# Patient Record
Sex: FEMALE | Race: BLACK OR AFRICAN AMERICAN | NOT HISPANIC OR LATINO | Employment: OTHER | URBAN - METROPOLITAN AREA
[De-identification: names, ages, dates, MRNs, and addresses within clinical notes are randomized per-mention and may not be internally consistent; named-entity substitution may affect disease eponyms.]

---

## 2021-07-07 ENCOUNTER — CLINICAL SUPPORT (OUTPATIENT)
Dept: REHABILITATION | Facility: HOSPITAL | Age: 71
End: 2021-07-07
Payer: MEDICAID

## 2021-07-07 DIAGNOSIS — M79.651 RIGHT THIGH PAIN: ICD-10-CM

## 2021-07-07 PROCEDURE — 97161 PT EVAL LOW COMPLEX 20 MIN: CPT

## 2021-07-12 ENCOUNTER — CLINICAL SUPPORT (OUTPATIENT)
Dept: REHABILITATION | Facility: HOSPITAL | Age: 71
End: 2021-07-12
Payer: MEDICAID

## 2021-07-12 DIAGNOSIS — M79.651 RIGHT THIGH PAIN: Primary | ICD-10-CM

## 2021-07-12 PROCEDURE — 97110 THERAPEUTIC EXERCISES: CPT | Performed by: PHYSICAL THERAPIST

## 2021-07-12 PROCEDURE — 97113 AQUATIC THERAPY/EXERCISES: CPT | Performed by: PHYSICAL THERAPIST

## 2021-07-14 ENCOUNTER — CLINICAL SUPPORT (OUTPATIENT)
Dept: REHABILITATION | Facility: HOSPITAL | Age: 71
End: 2021-07-14
Payer: MEDICAID

## 2021-07-14 DIAGNOSIS — M79.651 RIGHT THIGH PAIN: ICD-10-CM

## 2021-07-14 PROCEDURE — 97110 THERAPEUTIC EXERCISES: CPT

## 2021-07-19 ENCOUNTER — CLINICAL SUPPORT (OUTPATIENT)
Dept: REHABILITATION | Facility: HOSPITAL | Age: 71
End: 2021-07-19
Payer: MEDICAID

## 2021-07-19 DIAGNOSIS — M54.40 BILATERAL LOW BACK PAIN WITH SCIATICA, SCIATICA LATERALITY UNSPECIFIED, UNSPECIFIED CHRONICITY: ICD-10-CM

## 2021-07-19 DIAGNOSIS — M54.17 LUMBOSACRAL RADICULOPATHY: ICD-10-CM

## 2021-07-19 PROCEDURE — 97110 THERAPEUTIC EXERCISES: CPT | Performed by: PHYSICAL THERAPIST

## 2021-07-21 ENCOUNTER — CLINICAL SUPPORT (OUTPATIENT)
Dept: REHABILITATION | Facility: HOSPITAL | Age: 71
End: 2021-07-21
Payer: MEDICAID

## 2021-07-21 DIAGNOSIS — M79.651 RIGHT THIGH PAIN: Primary | ICD-10-CM

## 2021-07-21 PROCEDURE — 97110 THERAPEUTIC EXERCISES: CPT | Performed by: PHYSICAL THERAPIST

## 2021-07-27 ENCOUNTER — CLINICAL SUPPORT (OUTPATIENT)
Dept: REHABILITATION | Facility: HOSPITAL | Age: 71
End: 2021-07-27
Payer: MEDICAID

## 2021-07-27 DIAGNOSIS — M79.651 RIGHT THIGH PAIN: ICD-10-CM

## 2021-07-27 PROCEDURE — 97110 THERAPEUTIC EXERCISES: CPT

## 2021-08-04 ENCOUNTER — CLINICAL SUPPORT (OUTPATIENT)
Dept: REHABILITATION | Facility: HOSPITAL | Age: 71
End: 2021-08-04
Payer: MEDICAID

## 2021-08-04 DIAGNOSIS — M79.651 RIGHT THIGH PAIN: Primary | ICD-10-CM

## 2021-08-04 PROCEDURE — 97110 THERAPEUTIC EXERCISES: CPT | Performed by: PHYSICAL THERAPIST

## 2021-08-09 ENCOUNTER — CLINICAL SUPPORT (OUTPATIENT)
Dept: REHABILITATION | Facility: HOSPITAL | Age: 71
End: 2021-08-09
Payer: MEDICAID

## 2021-08-09 DIAGNOSIS — M79.651 RIGHT THIGH PAIN: Primary | ICD-10-CM

## 2021-08-09 PROCEDURE — 97110 THERAPEUTIC EXERCISES: CPT | Performed by: PHYSICAL THERAPIST

## 2021-08-11 ENCOUNTER — CLINICAL SUPPORT (OUTPATIENT)
Dept: REHABILITATION | Facility: HOSPITAL | Age: 71
End: 2021-08-11
Payer: MEDICAID

## 2021-08-11 DIAGNOSIS — M79.651 RIGHT THIGH PAIN: Primary | ICD-10-CM

## 2021-08-11 PROCEDURE — 97110 THERAPEUTIC EXERCISES: CPT | Performed by: PHYSICAL THERAPIST

## 2021-08-17 ENCOUNTER — CLINICAL SUPPORT (OUTPATIENT)
Dept: REHABILITATION | Facility: HOSPITAL | Age: 71
End: 2021-08-17
Payer: MEDICAID

## 2021-08-17 DIAGNOSIS — M79.651 RIGHT THIGH PAIN: ICD-10-CM

## 2021-08-17 PROCEDURE — 97110 THERAPEUTIC EXERCISES: CPT

## 2021-08-19 ENCOUNTER — CLINICAL SUPPORT (OUTPATIENT)
Dept: REHABILITATION | Facility: HOSPITAL | Age: 71
End: 2021-08-19
Payer: MEDICAID

## 2021-08-19 DIAGNOSIS — M79.651 RIGHT THIGH PAIN: ICD-10-CM

## 2021-08-19 PROCEDURE — 97110 THERAPEUTIC EXERCISES: CPT

## 2021-08-24 ENCOUNTER — CLINICAL SUPPORT (OUTPATIENT)
Dept: REHABILITATION | Facility: HOSPITAL | Age: 71
End: 2021-08-24
Payer: MEDICAID

## 2021-08-24 DIAGNOSIS — M79.651 RIGHT THIGH PAIN: ICD-10-CM

## 2021-08-24 PROCEDURE — 97110 THERAPEUTIC EXERCISES: CPT

## 2021-09-21 ENCOUNTER — CLINICAL SUPPORT (OUTPATIENT)
Dept: REHABILITATION | Facility: HOSPITAL | Age: 71
End: 2021-09-21
Payer: MEDICAID

## 2021-09-21 DIAGNOSIS — M79.651 RIGHT THIGH PAIN: ICD-10-CM

## 2021-09-21 PROCEDURE — 97110 THERAPEUTIC EXERCISES: CPT

## 2021-09-23 ENCOUNTER — CLINICAL SUPPORT (OUTPATIENT)
Dept: REHABILITATION | Facility: HOSPITAL | Age: 71
End: 2021-09-23
Payer: MEDICAID

## 2021-09-23 DIAGNOSIS — M79.651 RIGHT THIGH PAIN: ICD-10-CM

## 2021-09-23 PROCEDURE — 97110 THERAPEUTIC EXERCISES: CPT

## 2021-09-30 ENCOUNTER — CLINICAL SUPPORT (OUTPATIENT)
Dept: REHABILITATION | Facility: HOSPITAL | Age: 71
End: 2021-09-30
Payer: MEDICAID

## 2021-09-30 DIAGNOSIS — M79.651 RIGHT THIGH PAIN: Primary | ICD-10-CM

## 2021-09-30 PROCEDURE — 97110 THERAPEUTIC EXERCISES: CPT | Performed by: PHYSICAL THERAPIST

## 2021-10-04 ENCOUNTER — CLINICAL SUPPORT (OUTPATIENT)
Dept: REHABILITATION | Facility: HOSPITAL | Age: 71
End: 2021-10-04
Payer: MEDICAID

## 2021-10-04 DIAGNOSIS — M25.612 DECREASED ROM OF LEFT SHOULDER: ICD-10-CM

## 2021-10-04 DIAGNOSIS — R29.3 POSTURE ABNORMALITY: ICD-10-CM

## 2021-10-04 PROCEDURE — 97162 PT EVAL MOD COMPLEX 30 MIN: CPT

## 2021-10-07 ENCOUNTER — CLINICAL SUPPORT (OUTPATIENT)
Dept: REHABILITATION | Facility: HOSPITAL | Age: 71
End: 2021-10-07
Payer: MEDICAID

## 2021-10-07 DIAGNOSIS — M79.651 RIGHT THIGH PAIN: Primary | ICD-10-CM

## 2021-10-07 PROCEDURE — 97110 THERAPEUTIC EXERCISES: CPT | Performed by: PHYSICAL THERAPIST

## 2021-10-11 ENCOUNTER — CLINICAL SUPPORT (OUTPATIENT)
Dept: REHABILITATION | Facility: HOSPITAL | Age: 71
End: 2021-10-11
Payer: MEDICAID

## 2021-10-11 DIAGNOSIS — M79.651 RIGHT THIGH PAIN: Primary | ICD-10-CM

## 2021-10-11 PROCEDURE — 97110 THERAPEUTIC EXERCISES: CPT | Performed by: PHYSICAL THERAPIST

## 2021-10-14 ENCOUNTER — CLINICAL SUPPORT (OUTPATIENT)
Dept: REHABILITATION | Facility: HOSPITAL | Age: 71
End: 2021-10-14
Payer: MEDICAID

## 2021-10-14 DIAGNOSIS — M79.651 RIGHT THIGH PAIN: ICD-10-CM

## 2021-10-14 PROCEDURE — 97110 THERAPEUTIC EXERCISES: CPT | Mod: CQ

## 2021-10-18 ENCOUNTER — CLINICAL SUPPORT (OUTPATIENT)
Dept: REHABILITATION | Facility: HOSPITAL | Age: 71
End: 2021-10-18
Payer: MEDICAID

## 2021-10-18 DIAGNOSIS — M79.651 RIGHT THIGH PAIN: ICD-10-CM

## 2021-10-18 PROCEDURE — 97110 THERAPEUTIC EXERCISES: CPT | Mod: CQ

## 2021-10-21 ENCOUNTER — CLINICAL SUPPORT (OUTPATIENT)
Dept: REHABILITATION | Facility: HOSPITAL | Age: 71
End: 2021-10-21
Payer: MEDICAID

## 2021-10-21 DIAGNOSIS — M79.651 RIGHT THIGH PAIN: Primary | ICD-10-CM

## 2021-10-21 PROCEDURE — 97110 THERAPEUTIC EXERCISES: CPT | Performed by: PHYSICAL THERAPIST

## 2021-10-26 ENCOUNTER — CLINICAL SUPPORT (OUTPATIENT)
Dept: REHABILITATION | Facility: HOSPITAL | Age: 71
End: 2021-10-26
Payer: MEDICAID

## 2021-10-26 DIAGNOSIS — R29.3 POSTURE ABNORMALITY: ICD-10-CM

## 2021-10-26 DIAGNOSIS — M25.612 DECREASED ROM OF LEFT SHOULDER: ICD-10-CM

## 2021-10-26 PROCEDURE — 97110 THERAPEUTIC EXERCISES: CPT

## 2021-10-28 ENCOUNTER — CLINICAL SUPPORT (OUTPATIENT)
Dept: REHABILITATION | Facility: HOSPITAL | Age: 71
End: 2021-10-28
Payer: MEDICAID

## 2021-10-28 DIAGNOSIS — R29.3 POSTURE ABNORMALITY: ICD-10-CM

## 2021-10-28 DIAGNOSIS — M25.612 DECREASED ROM OF LEFT SHOULDER: ICD-10-CM

## 2021-10-28 PROCEDURE — 97110 THERAPEUTIC EXERCISES: CPT

## 2021-11-02 ENCOUNTER — CLINICAL SUPPORT (OUTPATIENT)
Dept: REHABILITATION | Facility: HOSPITAL | Age: 71
End: 2021-11-02
Payer: MEDICAID

## 2021-11-02 DIAGNOSIS — R29.3 POSTURE ABNORMALITY: ICD-10-CM

## 2021-11-02 DIAGNOSIS — M25.612 DECREASED ROM OF LEFT SHOULDER: ICD-10-CM

## 2021-11-02 PROCEDURE — 97110 THERAPEUTIC EXERCISES: CPT

## 2021-11-04 ENCOUNTER — CLINICAL SUPPORT (OUTPATIENT)
Dept: REHABILITATION | Facility: HOSPITAL | Age: 71
End: 2021-11-04
Payer: MEDICAID

## 2021-11-04 DIAGNOSIS — M79.651 RIGHT THIGH PAIN: ICD-10-CM

## 2021-11-04 PROCEDURE — 97110 THERAPEUTIC EXERCISES: CPT

## 2021-11-09 ENCOUNTER — CLINICAL SUPPORT (OUTPATIENT)
Dept: REHABILITATION | Facility: HOSPITAL | Age: 71
End: 2021-11-09
Payer: MEDICAID

## 2021-11-09 DIAGNOSIS — M79.651 RIGHT THIGH PAIN: ICD-10-CM

## 2021-11-09 PROCEDURE — 97110 THERAPEUTIC EXERCISES: CPT

## 2021-11-22 DIAGNOSIS — M54.12 CERVICAL RADICULOPATHY: Primary | ICD-10-CM

## 2021-11-23 ENCOUNTER — CLINICAL SUPPORT (OUTPATIENT)
Dept: REHABILITATION | Facility: HOSPITAL | Age: 71
End: 2021-11-23
Payer: MEDICAID

## 2021-11-23 DIAGNOSIS — M25.612 DECREASED ROM OF LEFT SHOULDER: ICD-10-CM

## 2021-11-23 DIAGNOSIS — R29.898 DECREASED ROM OF NECK: ICD-10-CM

## 2021-11-23 DIAGNOSIS — M54.2 NECK PAIN, CHRONIC: ICD-10-CM

## 2021-11-23 DIAGNOSIS — G89.29 NECK PAIN, CHRONIC: ICD-10-CM

## 2021-11-23 DIAGNOSIS — R29.3 POSTURE ABNORMALITY: ICD-10-CM

## 2021-11-23 PROCEDURE — 97162 PT EVAL MOD COMPLEX 30 MIN: CPT

## 2021-11-23 PROCEDURE — 97110 THERAPEUTIC EXERCISES: CPT

## 2021-11-30 PROBLEM — R29.898 DECREASED ROM OF NECK: Status: ACTIVE | Noted: 2021-11-30

## 2021-11-30 PROBLEM — G89.29 NECK PAIN, CHRONIC: Status: ACTIVE | Noted: 2021-11-30

## 2021-11-30 PROBLEM — M54.2 NECK PAIN, CHRONIC: Status: ACTIVE | Noted: 2021-11-30

## 2021-12-02 ENCOUNTER — CLINICAL SUPPORT (OUTPATIENT)
Dept: REHABILITATION | Facility: HOSPITAL | Age: 71
End: 2021-12-02
Payer: MEDICAID

## 2021-12-02 DIAGNOSIS — R29.898 DECREASED ROM OF NECK: ICD-10-CM

## 2021-12-02 DIAGNOSIS — M54.2 NECK PAIN, CHRONIC: ICD-10-CM

## 2021-12-02 DIAGNOSIS — M25.612 DECREASED ROM OF LEFT SHOULDER: ICD-10-CM

## 2021-12-02 DIAGNOSIS — R29.3 POSTURE ABNORMALITY: ICD-10-CM

## 2021-12-02 DIAGNOSIS — G89.29 NECK PAIN, CHRONIC: ICD-10-CM

## 2021-12-02 PROCEDURE — 97110 THERAPEUTIC EXERCISES: CPT

## 2021-12-02 PROCEDURE — 97140 MANUAL THERAPY 1/> REGIONS: CPT

## 2021-12-16 ENCOUNTER — CLINICAL SUPPORT (OUTPATIENT)
Dept: REHABILITATION | Facility: HOSPITAL | Age: 71
End: 2021-12-16
Payer: MEDICAID

## 2021-12-16 DIAGNOSIS — R29.898 DECREASED ROM OF NECK: ICD-10-CM

## 2021-12-16 DIAGNOSIS — G89.29 NECK PAIN, CHRONIC: ICD-10-CM

## 2021-12-16 DIAGNOSIS — M54.2 NECK PAIN, CHRONIC: ICD-10-CM

## 2021-12-16 PROCEDURE — 97110 THERAPEUTIC EXERCISES: CPT

## 2022-01-06 ENCOUNTER — CLINICAL SUPPORT (OUTPATIENT)
Dept: REHABILITATION | Facility: HOSPITAL | Age: 72
End: 2022-01-06
Payer: MEDICAID

## 2022-01-06 DIAGNOSIS — G89.29 NECK PAIN, CHRONIC: ICD-10-CM

## 2022-01-06 DIAGNOSIS — R29.898 DECREASED ROM OF NECK: ICD-10-CM

## 2022-01-06 DIAGNOSIS — M54.2 NECK PAIN, CHRONIC: ICD-10-CM

## 2022-01-06 PROCEDURE — 97110 THERAPEUTIC EXERCISES: CPT

## 2022-01-06 NOTE — PROGRESS NOTES
Physical Therapy Daily Treatment Note     Name: Chantal Israel [place]  Clinic Number: [id number]    Therapy Diagnosis:   Encounter Diagnoses   Name Primary?    Neck pain, chronic     Decreased ROM of neck      Physician: Bina Leach II, MD    Visit Date: 1/6/2022    Physician Orders:  PT Eval and Treat   Medical Diagnosis: M25.519 (ICD-10-CM) - Pain in joint, shoulder region  Evaluation Date: 10/04/2021  Authorization Period Expiration: 1/31/2021  Plan of Care Certification Period: 12/03/2021  Visit #/Visits authorized: 26/36     Time In: 14:01  Time Out: 14:58  Total Billable Time: 57 minutes    Units   TE: 4    Precautions: Standard and Diabetes    Subjective     Pt reports: returning to PT following a couple week break. She reports significant pain in her low back and down into her left LE. She also is having pain in her neck and on her right shoulder. She has difficulty with elevating her arm and with neck motions.     She was not compliant with home exercise program.  Response to previous treatment: no adverse reactions  Functional change: independent with HEP    Pain: 1/10  Location: left shoulder      Objective   **All charges billed as TE**    Cervical Range of Motion:     Limitation(%) Pain   Flexion 50 no   Extension 75 yes      Right Rotation 60 yes      Left Rotation 70 yes      Right Side Bending 80 yes   Left Side Bending 80 yes   C0-C1 Flex 75 no   C0-C1 Ext 50 no   C0-C1 Sidebending 50 (R>L) no   C1-2 Rotation 75 yes      Shoulder Active Range of Motion:   Shoulder Left Right   Flexion 130 120*   Abd 90 90   ER C5 C2*   IR L3 Sacrum*        Upper Extremity Strength  (R) UE   (L) UE     Shoulder flexion: 3+/5 Shoulder flexion: 4-/5   Shoulder Abduction: 3/5 Shoulder abduction: 3+/5   Shoulder ER 3+/5 Shoulder ER 4-/5   Shoulder IR 4/5 Shoulder IR 4/5       Chantal received therapeutic exercises to develop strength, endurance, ROM, posture and core stabilization for 26 minutes including:  Chin  "tucks 30x5"  Cervical rotations 30x ea  Wall slides 30x  Scapular retractions x 2 min    Not Performed  Thoracic ext in chair 30x  scap retractions orange TB 3x10x5"  Open book 20x ea  Pulleys x 5 minutes for mobility  B ER with OTB with scapular retractions 3 x 10   UBE x 4 min for cardiovascular training and endurance  AAROM flexion 3 x 10 min  AAROM ER 3 x 10 B   S/L ER with 1# weight 2 x 10 B   Scapular retractions x 2 min  Functional IR stretch with towel x 10 B  Wall slides 2 x 10     Pt education on compliance with HEP, pain science, and the importance of staying active.     Chantal received the following manual therapy techniques: Joint mobilizations and Soft tissue Mobilization were applied to the: L shoulder/neck for 31 minutes, including:  Cervical spine assessment  Shoulder mobility assessment  Sub occipital release  Gentle side glides C2-C4  Cervical rot MET  PT education    Chantal participated in neuromuscular re-education activities to improve: Balance, Coordination, Proprioception and Posture for 00 minutes. The following activities were included:      Chantal participated in dynamic functional therapeutic activities to improve functional performance for 00  minutes, including:      Home Exercises Provided and Patient Education Provided     Education provided:   - Pt education on compliance with HEP, pain science, and the importance of staying active.    Written Home Exercises Provided: Patient instructed to cont prior HEP.  Exercises were reviewed and Chantal was able to demonstrate them prior to the end of the session.  Chantal demonstrated good  understanding of the education provided.     See EMR under Patient Instructions for exercises provided prior visit.    Assessment     Chantal had significant pain in her low back and some into her neck and right shoulder. She feels like her back pain has gotten worse since not being in PT. She also feels like PT has helped her neck/shoulder pain. We discussed at " length the plan for her going forward in regards to her low back and neck/shoulder pain. She would like to make her low back/ left LE pain her priority and occasional PT with her neck/shoulder so she does not get warn out with PT. We agreed to start with 1x/week and make adjustments as needed. She did well with decreased neck pain and improved cervical rom following manual techniques. Updated her HEP and educated her about importance of performing exercises. Will continue to progress as tolerated.     Chantal is progressing well towards her goals.   Pt prognosis is Fair.     Pt will continue to benefit from skilled outpatient physical therapy to address the deficits listed in the problem list box on initial evaluation, provide pt/family education and to maximize pt's level of independence in the home and community environment.     Pt's spiritual, cultural and educational needs considered and pt agreeable to plan of care and goals.     Anticipated barriers to physical therapy: diabetes, fibromyalgia, DDD, HTN    Goals:  Short Term Goals (4 Weeks):   1. Pt will be independent with HEP to supplement PT in improving functional use of L UE.  2. Pt will increase pain free L shoulder elevation AROM to >/= 140 deg to improve functional mobility of UE  3. Pt will increase L shoulder ER AROM in 0 deg abduction to >/=20 deg to improve functional mobility of UE.  4. Pt will lift 4 lb objects without pain to promote functional QOL.     Long Term Goals (8 Weeks):   1. Pt will improve FOTO score to </= 41% limited to decrease perceived limitation with carrying, moving, and handling objects.  2. Pt will increase shoulder flexion AROM to WFL in all planes to improve functional use of L RUE.  3. Pt will increase shoulder ER AROM to WFL in all planes to improve functional use of L RUE.  4. Pt will lift 8 lb objects without pain to promote functional QOL.  5. Pt to report pain </= 3/10 with ADLs and IADLs using L UE to improve functional  QOL.    Plan   Plan of care Certification: 11/23/2021 to 1/31/21.    OP PT 1x/week for 4 weeks to work on neck rom, strength, and mobility.     Jaya Christie, PT

## 2022-01-10 ENCOUNTER — CLINICAL SUPPORT (OUTPATIENT)
Dept: REHABILITATION | Facility: HOSPITAL | Age: 72
End: 2022-01-10
Payer: MEDICAID

## 2022-01-10 DIAGNOSIS — M54.9 BACK PAIN, UNSPECIFIED BACK LOCATION, UNSPECIFIED BACK PAIN LATERALITY, UNSPECIFIED CHRONICITY: ICD-10-CM

## 2022-01-10 PROCEDURE — 97161 PT EVAL LOW COMPLEX 20 MIN: CPT | Performed by: PHYSICAL THERAPIST

## 2022-01-10 PROCEDURE — 97140 MANUAL THERAPY 1/> REGIONS: CPT | Performed by: PHYSICAL THERAPIST

## 2022-01-10 PROCEDURE — 97112 NEUROMUSCULAR REEDUCATION: CPT | Performed by: PHYSICAL THERAPIST

## 2022-01-10 NOTE — PLAN OF CARE
[facility]  Physical Therapy Initial Evaluation    Date: 1/10/2022   Name: Chantal Ridley  Clinic Number: [id number]    Therapy Diagnosis: No diagnosis found.  Physician: Yazan Hansen DPM    Physician Orders: PT Eval and Treat   Medical Diagnosis from Referral: M54.9 (ICD-10-CM) - Back pain  Evaluation Date: 1/10/2022  Authorization Period Expiration: 07/31/2022  Plan of Care Expiration: 4/31/2022  Visit # / Visits authorized: 1/ 1    Time In: 1300  Time Out: 1400  Total Appointment Time (timed & untimed codes): 60 minutes    Precautions: Standard    Subjective   Date of onset: August, 2021  History of current condition - Chantal reports: Gradual onset of L sided low back and hip pain. Became worse following a 10 hour car ride [place] from [place]. Went to aquatic PT for treatment which improved overall strength but did not have a lasting result on her pain. Denies numbness or tingling. Would like to improve mobility and function.      Pain:  Current 3/10, worst 7/10, best 2/10   Location: low back into left hip, lateral upper leg  Description: Aching  Aggravating Factors: Sitting, extended walking, morning  Easing Factors: rest    Pts goals: decrease pain, improve mobility and function    Medical History:   Past Medical History:   Diagnosis Date    Diabetes mellitus     Fibromyalgia     Hx of degenerative disc disease     neck & back    Hypertension        Surgical History:   Chantal Ridley  has a past surgical history that includes Tubal ligation and Cataract extraction w/  intraocular lens implant (05/17/2016).    Medications:   Chantal has a current medication list which includes the following prescription(s): acetaminophen, amitriptyline, aspirin, baclofen, cyclobenzaprine, estradiol, losartan, medroxyprogesterone, naproxen, and sitagliptan-metformin.    Allergies:   Review of patient's allergies indicates:   Allergen Reactions    Ace inhibitors Anaphylaxis,  Itching and Swelling    Amoxicillin Anaphylaxis    Erythromycin Swelling and Hives     Tongue swelling    Penicillins Anaphylaxis        Imaging, none:     Prior Therapy: Yes, aquatic  Exercise Routine/Sports Participation: None  Social History: lives home  Occupation: retired  Prior Level of Function: Pain with ADLs   Current Level of Function: Pain with ADLs    Objective     Observation: Left antalgic gait pattern    Posture:  hyperlordotic      Lumbar Range of Motion:    Limitations Pain   Flexion 50   -        Extension 75   +        Left Side Bending 50 -        Right Side Bending 50 +             Right  Left    KELVIN - +   DIONICIO - +   Hip Scour - -     Neural Tension Testing:   SLR: +  Femoral Nerve Glide:       Joint Mobility: Hypomobile left lumbar gapping, hypomobile lateral femoral glide    Palpation: increased tissue guarding along the left lumbar paraspinals      TREATMENT   Treatment Time In: 1330  Treatment Time Out: 1400  Total Treatment time (time-based codes) separate from Evaluation: 30 minutes    Chantal received the following manual therapy techniques: Joint mobilizations were applied to the: lumbar spine and hip for 15 minutes, including:  Gross lumbar distraction  Lumbar flexion mobilizations IV      Chantal participated in neuromuscular re-education activities to improve: Balance, Coordination, Kinesthetic and Proprioception for 15 minutes. The following activities were included:  FMP thoracolumbar flexion 5x  Slump slider 15x  Bridge 10x    Education provided:   Diagnosis and prognosis      Written Home Exercises Provided: yes.  Exercises were reviewed and Chantal was able to demonstrate them prior to the end of the session.  Chantal demonstrated good  understanding of the education provided.     See EMR under Patient Instructions for exercises provided 1/10/2022.    Assessment   Chantal is a 71 y.o. female referred to outpatient Physical Therapy with a medical diagnosis of low back pain. Pt presents  with lumbar stenosis resulting in lateral hip pain. Should respond well to flexion based treatment, neruodynamic exercises, and core strengthening    Pt prognosis is Guarded.   Pt will benefit from skilled outpatient Physical Therapy to address the deficits stated above and in the chart below, provide pt/family education, and to maximize pt's level of independence.     Plan of care discussed with patient: Yes  Pt's spiritual, cultural and educational needs considered and patient is agreeable to the plan of care and goals as stated below:     Anticipated Barriers for therapy: Age, chronicity    Medical Necessity is demonstrated by the following  History  Co-morbidities and personal factors that may impact the plan of care Co-morbidities:   advanced age    Personal Factors:   lifestyle     low   Examination  Body Structures and Functions, activity limitations and participation restrictions that may impact the plan of care Body Regions:   back    Body Systems:    gross symmetry  ROM  strength    Participation Restrictions:   NA    Activity limitations:   Learning and applying knowledge  No deficits    General Tasks and Commands  {No deficits    Communication  No deficits    Mobility  walking    Self care  No deficits    Domestic Life  No deficits    Interactions/Relationships  No deficits    Life Areas  NA    Community and Social Life  No deficits         moderate   Clinical Presentation unstable clinical presentation with unpredictable characteristics high   Decision Making/ Complexity Score: low     GOALS: Short Term Goals:  6 weeks  1.Report decreased low back pain  < / =  2/10  to increase tolerance for exercise  2. Increase ROM by 10 degrees where limited in order to perform ADLs without difficulty.  3. Increase strength by 1/3 MMT grade in gluteal muscles  to increase tolerance for ADL and work activities.  4. Pt to tolerate HEP to improve ROM and independence with ADL's    Long Term Goals: 12 weeks  1.Report  decreased low back pain < / = 0/10  to increase tolerance for ADLs  2.Patient goal: improve general mobility  3.Increase strength to 4+/5 in  Gluteal muscles  to increase tolerance for ADL and work activities.    Plan     Outpatient Physical Therapy 1 times weekly for 8 weeks to include the following interventions: manual therapy, therapeutic exercise, therapeutic activities, and neuromuscular re-education.    Juan F Mitchell, PT, DPT

## 2022-01-13 ENCOUNTER — CLINICAL SUPPORT (OUTPATIENT)
Dept: REHABILITATION | Facility: HOSPITAL | Age: 72
End: 2022-01-13
Payer: MEDICAID

## 2022-01-13 DIAGNOSIS — M54.2 NECK PAIN, CHRONIC: ICD-10-CM

## 2022-01-13 DIAGNOSIS — G89.29 NECK PAIN, CHRONIC: ICD-10-CM

## 2022-01-13 DIAGNOSIS — R29.898 DECREASED ROM OF NECK: ICD-10-CM

## 2022-01-13 PROCEDURE — 97110 THERAPEUTIC EXERCISES: CPT

## 2022-01-13 NOTE — PROGRESS NOTES
"          Physical Therapy Daily Treatment Note     Name: Chantal [place]  Clinic Number: [id number]    Therapy Diagnosis:   Encounter Diagnoses   Name Primary?    Neck pain, chronic     Decreased ROM of neck      Physician: Bina Leach II, MD    Visit Date: 1/13/2022    Physician Orders:  PT Eval and Treat   Medical Diagnosis: M25.519 (ICD-10-CM) - Pain in joint, shoulder region  Evaluation Date: 10/04/2021  Authorization Period Expiration: 1/31/2021  Plan of Care Certification Period: 12/03/2021  Visit #/Visits authorized: 26/36     Time In: 14:00  Time Out: 14:58  Total Billable Time: 28 minutes    Units   TE: 2    Precautions: Standard and Diabetes    Subjective     Pt reports: doing better from last session. Still feels like her rom is restricted to the left.     She was not compliant with home exercise program.  Response to previous treatment: no adverse reactions  Functional change: independent with HEP    Pain: 1/10  Location: left shoulder      Objective   **All charges billed as TE**    Chantal received therapeutic exercises to develop strength, endurance, ROM, posture and core stabilization for 14 minutes including:  Chin tucks 40x5"  Cervical rotations 30x ea  Wall slides 30x  scap retractions orange TB 3x10x5"  Open book 20x ea    Not Performed  Thoracic ext in chair 30x  Pulleys x 5 minutes for mobility  B ER with OTB with scapular retractions 3 x 10   UBE x 4 min for cardiovascular training and endurance  AAROM flexion 3 x 10 min  AAROM ER 3 x 10 B   S/L ER with 1# weight 2 x 10 B   Scapular retractions x 2 min  Functional IR stretch with towel x 10 B  Wall slides 2 x 10     Pt education on compliance with HEP, pain science, and the importance of staying active.     Chantal received the following manual therapy techniques: Joint mobilizations and Soft tissue Mobilization were applied to the: L shoulder/neck for 14 minutes, including:  Cervical spine assessment  Shoulder mobility assessment  Sub " occipital release  Gentle side glides C2-C4  Cervical rot MET  PT education    Chantal participated in neuromuscular re-education activities to improve: Balance, Coordination, Proprioception and Posture for 00 minutes. The following activities were included:      Chantal participated in dynamic functional therapeutic activities to improve functional performance for 00  minutes, including:      Home Exercises Provided and Patient Education Provided     Education provided:   - Pt education on compliance with HEP, pain science, and the importance of staying active.    Written Home Exercises Provided: Patient instructed to cont prior HEP.  Exercises were reviewed and Chantal was able to demonstrate them prior to the end of the session.  Chantal demonstrated good  understanding of the education provided.     See EMR under Patient Instructions for exercises provided prior visit.    Assessment     Chantal did well with exercises today. Able to show in session improvements with cervical rom following manual techniques. Added back scap retractions with band and open books. Will continue progressing exercises as tolerated.     Chantal is progressing well towards her goals.   Pt prognosis is Fair.     Pt will continue to benefit from skilled outpatient physical therapy to address the deficits listed in the problem list box on initial evaluation, provide pt/family education and to maximize pt's level of independence in the home and community environment.     Pt's spiritual, cultural and educational needs considered and pt agreeable to plan of care and goals.     Anticipated barriers to physical therapy: diabetes, fibromyalgia, DDD, HTN    Goals:  Short Term Goals (4 Weeks):   1. Pt will be independent with HEP to supplement PT in improving functional use of L UE.  2. Pt will increase pain free L shoulder elevation AROM to >/= 140 deg to improve functional mobility of UE  3. Pt will increase L shoulder ER AROM in 0 deg abduction to >/=20  deg to improve functional mobility of UE.  4. Pt will lift 4 lb objects without pain to promote functional QOL.     Long Term Goals (8 Weeks):   1. Pt will improve FOTO score to </= 41% limited to decrease perceived limitation with carrying, moving, and handling objects.  2. Pt will increase shoulder flexion AROM to WFL in all planes to improve functional use of L RUE.  3. Pt will increase shoulder ER AROM to WFL in all planes to improve functional use of L RUE.  4. Pt will lift 8 lb objects without pain to promote functional QOL.  5. Pt to report pain </= 3/10 with ADLs and IADLs using L UE to improve functional QOL.    Plan   Plan of care Certification: 11/23/2021 to 1/31/21.    OP PT 1x/week for 4 weeks to work on neck rom, strength, and mobility.     Jaya Christie, PT

## 2022-01-18 ENCOUNTER — CLINICAL SUPPORT (OUTPATIENT)
Dept: REHABILITATION | Facility: HOSPITAL | Age: 72
End: 2022-01-18
Payer: MEDICAID

## 2022-01-18 DIAGNOSIS — M54.40 BILATERAL LOW BACK PAIN WITH SCIATICA, SCIATICA LATERALITY UNSPECIFIED, UNSPECIFIED CHRONICITY: Primary | ICD-10-CM

## 2022-01-18 PROCEDURE — 97112 NEUROMUSCULAR REEDUCATION: CPT | Mod: CQ

## 2022-01-18 NOTE — PROGRESS NOTES
"  Physical Therapy Daily Treatment Note     Name: Chantal Israel [place]  Clinic Number: [id number]    Therapy Diagnosis:   Encounter Diagnosis   Name Primary?    Bilateral low back pain with sciatica, sciatica laterality unspecified, unspecified chronicity Yes     Physician: Bina Leach II, MD    Visit Date: 1/18/2022    Physician Orders: PT Eval and Treat   Medical Diagnosis from Referral: M54.9 (ICD-10-CM) - Back pain  Evaluation Date: 1/10/2022  Authorization Period Expiration: 07/31/2022  Plan of Care Expiration: 4/31/2022  Visit # / Visits authorized: 3/12     Time In: 1412  Time Out: 1505  Total Billable Time: 45 minutes    Precautions: Standard    Subjective     Pt reports: Pt returns to therapy this pm with c/o continued lumbar and LLE pain upon entry. Reports noncompliance with HEP because she didn't get her printout.     She was compliant with home exercise program.  Response to previous treatment: No adverse effects  Functional change: N/A    Pain: not verbalized/10  Location: lumbar region     Objective     Chantal received therapeutic exercises to develop strength, endurance, ROM and flexibility for 00 minutes including:      Chantal received the following manual therapy techniques: Joint mobilizations were applied for 00 minutes including:    Gross lumbar distraction  Lumbar flexion mobilizations IV    Chantal participated in neuromuscular re-education activities to improve Coordination, Posture and Core Stablization for 45 minutes. The following activities were included:    Slump sliders 2 x 10 sergey  PPT 2 x 10 x 10"  Glute sets 2 x 10 x 10"  FMP thoracolumbar flexion  Bridges 2 x 10 x 5"  Pt education: HEP compliance/frequency    Chantal participated in dynamic functional therapeutic activities to improve functional performance for 00 minutes including:          Home Exercises Provided and Patient Education Provided     Education provided:   - Diagnosis and prognosis    Written Home Exercises Provided: " yes.  Exercises were reviewed and Chantal was able to demonstrate them prior to the end of the session.  Chantal demonstrated good  understanding of the education provided.     See EMR under Patient Instructions for exercises provided 1/10/22.    Assessment     Pt was able to complete all exercises including progressions with c/o lumbar pain and spasms throughout tx. She had difficulty isolating glutes during glute sets and posteriorly tilting pelvis. Issued printout and review HEP/frequency; pt voiced understanding. No adverse effects reported p tx.     Chantal Is progressing well towards her goals.   Pt prognosis is Guarded.     Pt will continue to benefit from skilled outpatient physical therapy to address the deficits listed in the problem list box on initial evaluation, provide pt/family education and to maximize pt's level of independence in the home and community environment.     Pt's spiritual, cultural and educational needs considered and pt agreeable to plan of care and goals.     Anticipated barriers to physical therapy: age, chronicity    GOALS: Short Term Goals:  6 weeks  1.Report decreased low back pain  < / =  2/10  to increase tolerance for exercise  2. Increase ROM by 10 degrees where limited in order to perform ADLs without difficulty.  3. Increase strength by 1/3 MMT grade in gluteal muscles  to increase tolerance for ADL and work activities.  4. Pt to tolerate HEP to improve ROM and independence with ADL's     Long Term Goals: 12 weeks  1.Report decreased low back pain < / = 0/10  to increase tolerance for ADLs  2.Patient goal: improve general mobility  3.Increase strength to 4+/5 in  Gluteal muscles  to increase tolerance for ADL and work activities.    Plan     Outpatient Physical Therapy 1 times weekly for 8 weeks to include the following interventions: manual therapy, therapeutic exercise, therapeutic activities, and neuromuscular re-education.    Jayne Price, PTA

## 2022-01-20 ENCOUNTER — CLINICAL SUPPORT (OUTPATIENT)
Dept: REHABILITATION | Facility: HOSPITAL | Age: 72
End: 2022-01-20
Payer: MEDICAID

## 2022-01-20 DIAGNOSIS — G89.29 NECK PAIN, CHRONIC: ICD-10-CM

## 2022-01-20 DIAGNOSIS — M54.2 NECK PAIN, CHRONIC: ICD-10-CM

## 2022-01-20 DIAGNOSIS — R29.898 DECREASED ROM OF NECK: ICD-10-CM

## 2022-01-20 PROCEDURE — 97140 MANUAL THERAPY 1/> REGIONS: CPT

## 2022-01-20 PROCEDURE — 97110 THERAPEUTIC EXERCISES: CPT

## 2022-01-20 NOTE — PROGRESS NOTES
"  Physical Therapy Daily Treatment Note     Name: Chantal Israel [place]  Clinic Number: [id number]    Therapy Diagnosis:   Encounter Diagnoses   Name Primary?    Neck pain, chronic     Decreased ROM of neck      Physician: Bina Leach II, MD    Visit Date: 1/20/2022    Physician Orders:  PT Eval and Treat   Medical Diagnosis: M25.519 (ICD-10-CM) - Pain in joint, shoulder region  Evaluation Date: 10/04/2021  Authorization Period Expiration: 1/31/2021  Plan of Care Certification Period: 12/03/2021  Visit #/Visits authorized: 26/36     Time In: 13:00  Time Out: 14:00  Total Billable Time: 60 minutes    Units   TE: 4    Precautions: Standard and Diabetes    Subjective     Pt reports: feels a little better from last session but still feels limited with cervical rom.     She was not compliant with home exercise program.  Response to previous treatment: no adverse reactions  Functional change: independent with HEP    Pain: 1/10  Location: left shoulder      Objective   **All charges billed as TE**    Chantal received therapeutic exercises to develop strength, endurance, ROM, posture and core stabilization for 31 minutes including:  Chin tucks 40x5"  Cervical rotations 30x ea  Wall slides 30x  scap retractions orange TB 3x10x5"  Open book 20x ea  No moneys orange TB 3x12    Not Performed  Thoracic ext in chair 30x  Pulleys x 5 minutes for mobility  B ER with OTB with scapular retractions 3 x 10   UBE x 4 min for cardiovascular training and endurance  AAROM flexion 3 x 10 min  AAROM ER 3 x 10 B   S/L ER with 1# weight 2 x 10 B   Scapular retractions x 2 min  Functional IR stretch with towel x 10 B  Wall slides 2 x 10     Pt education on compliance with HEP, pain science, and the importance of staying active.     Chantal received the following manual therapy techniques: Joint mobilizations and Soft tissue Mobilization were applied to the: L shoulder/neck for 27 minutes, including:  Cervical spine assessment  Shoulder mobility " assessment  Sub occipital release  Gentle side glides C2-C4  Cervical rot MET  PT education    Chantal participated in neuromuscular re-education activities to improve: Balance, Coordination, Proprioception and Posture for 00 minutes. The following activities were included:      Chantal participated in dynamic functional therapeutic activities to improve functional performance for 00  minutes, including:      Home Exercises Provided and Patient Education Provided     Education provided:   - Pt education on compliance with HEP, pain science, and the importance of staying active.    Written Home Exercises Provided: Patient instructed to cont prior HEP.  Exercises were reviewed and Chantal was able to demonstrate them prior to the end of the session.  Chantal demonstrated good  understanding of the education provided.     See EMR under Patient Instructions for exercises provided prior visit.    Assessment     Chantal presented with decreased cervical rom today. Improvement with manual techniques (R>L). Added scap retractions and no moneys. Continue to progress cervical rom, thoracic mobility, and strengthening.     Chantal is progressing well towards her goals.   Pt prognosis is Fair.     Pt will continue to benefit from skilled outpatient physical therapy to address the deficits listed in the problem list box on initial evaluation, provide pt/family education and to maximize pt's level of independence in the home and community environment.     Pt's spiritual, cultural and educational needs considered and pt agreeable to plan of care and goals.     Anticipated barriers to physical therapy: diabetes, fibromyalgia, DDD, HTN    Goals:  Short Term Goals (4 Weeks):   1. Pt will be independent with HEP to supplement PT in improving functional use of L UE.  2. Pt will increase pain free L shoulder elevation AROM to >/= 140 deg to improve functional mobility of UE  3. Pt will increase L shoulder ER AROM in 0 deg abduction to >/=20 deg to  improve functional mobility of UE.  4. Pt will lift 4 lb objects without pain to promote functional QOL.     Long Term Goals (8 Weeks):   1. Pt will improve FOTO score to </= 41% limited to decrease perceived limitation with carrying, moving, and handling objects.  2. Pt will increase shoulder flexion AROM to WFL in all planes to improve functional use of L RUE.  3. Pt will increase shoulder ER AROM to WFL in all planes to improve functional use of L RUE.  4. Pt will lift 8 lb objects without pain to promote functional QOL.  5. Pt to report pain </= 3/10 with ADLs and IADLs using L UE to improve functional QOL.    Plan   Plan of care Certification: 11/23/2021 to 1/31/21.    OP PT 1x/week for 4 weeks to work on neck rom, strength, and mobility.     Jaya Christie, PT

## 2022-01-25 ENCOUNTER — CLINICAL SUPPORT (OUTPATIENT)
Dept: REHABILITATION | Facility: HOSPITAL | Age: 72
End: 2022-01-25
Payer: MEDICAID

## 2022-01-25 DIAGNOSIS — M54.50 LOW BACK PAIN, UNSPECIFIED BACK PAIN LATERALITY, UNSPECIFIED CHRONICITY, UNSPECIFIED WHETHER SCIATICA PRESENT: ICD-10-CM

## 2022-01-25 PROCEDURE — 97112 NEUROMUSCULAR REEDUCATION: CPT | Performed by: PHYSICAL THERAPIST

## 2022-01-25 PROCEDURE — 97140 MANUAL THERAPY 1/> REGIONS: CPT | Performed by: PHYSICAL THERAPIST

## 2022-01-25 NOTE — PROGRESS NOTES
"  Physical Therapy Daily Treatment Note     Name: Chantal Israel [place]  Clinic Number: [id number]    Therapy Diagnosis:   Encounter Diagnosis   Name Primary?    Low back pain, unspecified back pain laterality, unspecified chronicity, unspecified whether sciatica present      Physician: Bina Leach II, MD    Visit Date: 1/25/2022    Physician Orders: PT Eval and Treat   Medical Diagnosis from Referral: M54.9 (ICD-10-CM) - Back pain  Evaluation Date: 1/10/2022  Authorization Period Expiration: 07/31/2022  Plan of Care Expiration: 4/31/2022  Visit # / Visits authorized: 5/12     Time In: 1359  Time Out: 1500  Total Billable Time: 55 minutes    Precautions: Standard    Subjective     Pt reports: Has had increased R sided back pain since Friday. Does not know why. Has not been working on any exercises.    She was compliant with home exercise program.  Response to previous treatment: No adverse effects  Functional change: N/A    Pain: not verbalized/10  Location: lumbar region     Objective     Chantal received therapeutic exercises to develop strength, endurance, ROM and flexibility for 00 minutes including:      Chantal received the following manual therapy techniques: Joint mobilizations were applied for 10 minutes including:    Gross lumbar distraction  Lumbar flexion mobilizations IV  Lower lumbar rotation MET R    Chantal participated in neuromuscular re-education activities to improve Coordination, Posture and Core Stablization for 45 minutes. The following activities were included:    Slump sliders 2 x 10 sergey  Abdominal brace 10 x 10"  PPT 2 x 10 x 10"  Glute sets 2 x 10 x 10"  FMP thoracolumbar flexion  Bridges 2 x 10 x 5"  Hip ADD iso 2 x 10 w/ 5" hold  Supine clamshell 2 x 10 w/ 5" hold  Pt education: HEP compliance/frequency    Chantal participated in dynamic functional therapeutic activities to improve functional performance for 00 minutes including:      Home Exercises Provided and Patient Education Provided "     Education provided:   - Diagnosis and prognosis    Written Home Exercises Provided: yes.  Exercises were reviewed and Chantal was able to demonstrate them prior to the end of the session.  Chantal demonstrated good  understanding of the education provided.     See EMR under Patient Instructions for exercises provided 1/10/22.    Assessment   Poor compliance with HEP. Requires baseline lumbo pelvic strength, control, and mobility prior to progression.    Chantal Is progressing well towards her goals.   Pt prognosis is Guarded.     Pt will continue to benefit from skilled outpatient physical therapy to address the deficits listed in the problem list box on initial evaluation, provide pt/family education and to maximize pt's level of independence in the home and community environment.     Pt's spiritual, cultural and educational needs considered and pt agreeable to plan of care and goals.     Anticipated barriers to physical therapy: age, chronicity    GOALS: Short Term Goals:  6 weeks  1.Report decreased low back pain  < / =  2/10  to increase tolerance for exercise  2. Increase ROM by 10 degrees where limited in order to perform ADLs without difficulty.  3. Increase strength by 1/3 MMT grade in gluteal muscles  to increase tolerance for ADL and work activities.  4. Pt to tolerate HEP to improve ROM and independence with ADL's     Long Term Goals: 12 weeks  1.Report decreased low back pain < / = 0/10  to increase tolerance for ADLs  2.Patient goal: improve general mobility  3.Increase strength to 4+/5 in  Gluteal muscles  to increase tolerance for ADL and work activities.    Plan     Outpatient Physical Therapy 1 times weekly for 8 weeks to include the following interventions: manual therapy, therapeutic exercise, therapeutic activities, and neuromuscular re-education.    Juan F Mitchell, PT, DPT

## 2022-02-01 ENCOUNTER — CLINICAL SUPPORT (OUTPATIENT)
Dept: REHABILITATION | Facility: HOSPITAL | Age: 72
End: 2022-02-01
Payer: MEDICAID

## 2022-02-01 DIAGNOSIS — M54.50 LOW BACK PAIN, UNSPECIFIED BACK PAIN LATERALITY, UNSPECIFIED CHRONICITY, UNSPECIFIED WHETHER SCIATICA PRESENT: ICD-10-CM

## 2022-02-01 PROCEDURE — 97110 THERAPEUTIC EXERCISES: CPT | Mod: CQ

## 2022-02-01 NOTE — PROGRESS NOTES
"  Physical Therapy Daily Treatment Note     Name: Chantal Israel [place]  Clinic Number: [id number]    Therapy Diagnosis:   Encounter Diagnosis   Name Primary?    Low back pain, unspecified back pain laterality, unspecified chronicity, unspecified whether sciatica present      Physician: Bina Leach II, MD    Visit Date: 2/1/2022    Physician Orders: PT Eval and Treat   Medical Diagnosis from Referral: M54.9 (ICD-10-CM) - Back pain  Evaluation Date: 1/10/2022  Authorization Period Expiration: 07/31/2022  Plan of Care Expiration: 4/31/2022  Visit # / Visits authorized: 6/12     Time In: 1411  Time Out: 1500  Total Billable Time: 45 minutes    Precautions: Standard    Subjective     Pt reports: Pt reports increased lumbar and RLE pain the past couple weeks. She reports sig lumbar pain when seated.    She was compliant with home exercise program.  Response to previous treatment: No adverse effects  Functional change: N/A    Pain: not verbalized/10  Location: lumbar region     Objective     Chantal received therapeutic exercises to develop strength, endurance, ROM and flexibility for 00 minutes including:      Chantal received the following manual therapy techniques: Joint mobilizations were applied for 00 minutes including:    Gross lumbar distraction  Lumbar flexion mobilizations IV  Lower lumbar rotation MET R    Chantal participated in neuromuscular re-education activities to improve Coordination, Posture and Core Stablization for 45 minutes. The following activities were included:    Slump sliders 2 x 10 sergey  Abdominal brace 10 x 10"  PPT 10" hold x 4'  Glute sets 10" hold x 4'  FMP thoracolumbar flexion  Bridges OTB 2 x 10 x 5"  Hip ADD iso 10" hold x 4'  Supine clamshell 10" hold x 4'  Pt education: HEP compliance/frequency    Chantal participated in dynamic functional therapeutic activities to improve functional performance for 00 minutes including:      Home Exercises Provided and Patient Education Provided     Education " provided:   - Diagnosis and prognosis    Written Home Exercises Provided: yes.  Exercises were reviewed and Chantal was able to demonstrate them prior to the end of the session.  Chantal demonstrated good  understanding of the education provided.     See EMR under Patient Instructions for exercises provided 1/10/22.    Assessment     Pt was able to complete all exercises including progressions with no c/o increased lumbar pain. Core/glutes remain weak at this time. Encouraged improved compliance with HEP; pt voiced understanding. No adverse effects reported p tx.     Requires baseline lumbo pelvic strength, control, and mobility prior to progression.    Chantal Is progressing well towards her goals.   Pt prognosis is Guarded.     Pt will continue to benefit from skilled outpatient physical therapy to address the deficits listed in the problem list box on initial evaluation, provide pt/family education and to maximize pt's level of independence in the home and community environment.     Pt's spiritual, cultural and educational needs considered and pt agreeable to plan of care and goals.     Anticipated barriers to physical therapy: age, chronicity    GOALS: Short Term Goals:  6 weeks  1.Report decreased low back pain  < / =  2/10  to increase tolerance for exercise  2. Increase ROM by 10 degrees where limited in order to perform ADLs without difficulty.  3. Increase strength by 1/3 MMT grade in gluteal muscles  to increase tolerance for ADL and work activities.  4. Pt to tolerate HEP to improve ROM and independence with ADL's     Long Term Goals: 12 weeks  1.Report decreased low back pain < / = 0/10  to increase tolerance for ADLs  2.Patient goal: improve general mobility  3.Increase strength to 4+/5 in  Gluteal muscles  to increase tolerance for ADL and work activities.    Plan     Outpatient Physical Therapy 1 times weekly for 8 weeks to include the following interventions: manual therapy, therapeutic exercise,  therapeutic activities, and neuromuscular re-education.    Jayne Price, PTA

## 2022-02-08 ENCOUNTER — CLINICAL SUPPORT (OUTPATIENT)
Dept: REHABILITATION | Facility: HOSPITAL | Age: 72
End: 2022-02-08
Payer: MEDICAID

## 2022-02-08 DIAGNOSIS — M54.50 LOW BACK PAIN, UNSPECIFIED BACK PAIN LATERALITY, UNSPECIFIED CHRONICITY, UNSPECIFIED WHETHER SCIATICA PRESENT: ICD-10-CM

## 2022-02-08 PROCEDURE — 97140 MANUAL THERAPY 1/> REGIONS: CPT | Performed by: PHYSICAL THERAPIST

## 2022-02-08 PROCEDURE — 97112 NEUROMUSCULAR REEDUCATION: CPT | Performed by: PHYSICAL THERAPIST

## 2022-02-08 NOTE — PROGRESS NOTES
"  Physical Therapy Daily Treatment Note     Name: Chantal Israel [place]  Clinic Number: [id number]    Therapy Diagnosis:   Encounter Diagnosis   Name Primary?    Low back pain, unspecified back pain laterality, unspecified chronicity, unspecified whether sciatica present      Physician: Bina Leach II, MD    Visit Date: 2/8/2022    Physician Orders: PT Eval and Treat   Medical Diagnosis from Referral: M54.9 (ICD-10-CM) - Back pain  Evaluation Date: 1/10/2022  Authorization Period Expiration: 07/31/2022  Plan of Care Expiration: 4/31/2022  Visit # / Visits authorized: 7/12     Time In: 1400  Time Out: 1500  Total Billable Time: 55 minutes    Precautions: Standard    Subjective     Pt reports: Most pain when she transitions from sit <> stand.    She was compliant with home exercise program.  Response to previous treatment: No adverse effects  Functional change: N/A    Pain: not verbalized/10  Location: lumbar region     Objective     Chantal received therapeutic exercises to develop strength, endurance, ROM and flexibility for 00 minutes including:      Chantal received the following manual therapy techniques: Joint mobilizations were applied for 10 minutes including:    Gross lumbar distraction  Lumbar flexion mobilizations IV  Lower lumbar rotation MET R    Chantal participated in neuromuscular re-education activities to improve Coordination, Posture and Core Stablization for 45 minutes. The following activities were included:    DKTC with ball 4 x 10  SBall rotation 4 x 10  SL thoracic rotation 20x  Slump sliders 2 x 10 sergey  Abdominal brace 10 x 10"  PPT 10" hold x 4'  Glute sets 10" hold x 4'  FMP thoracolumbar flexion  Bridges OTB 2 x 10 x 5"  Hip ADD iso 10" hold x 4'  Supine clamshell 10" hold x 4'  Pt education: HEP compliance/frequency    Chantal participated in dynamic functional therapeutic activities to improve functional performance for 00 minutes including:      Home Exercises Provided and Patient Education " Provided     Education provided:   - Diagnosis and prognosis    Written Home Exercises Provided: yes.  Exercises were reviewed and Chantal was able to demonstrate them prior to the end of the session.  Chantal demonstrated good  understanding of the education provided.     See EMR under Patient Instructions for exercises provided 1/10/22.    Assessment     Shows improved lumbopelvic control this session. Gross mobility is still poor but she can complete basic exercises, which she could not do a few weeks ago. Progression is encouraging though she needs to be more complaint with her HEP.    Requires baseline lumbo pelvic strength, control, and mobility prior to progression.    Chantal Is progressing well towards her goals.   Pt prognosis is Guarded.     Pt will continue to benefit from skilled outpatient physical therapy to address the deficits listed in the problem list box on initial evaluation, provide pt/family education and to maximize pt's level of independence in the home and community environment.     Pt's spiritual, cultural and educational needs considered and pt agreeable to plan of care and goals.     Anticipated barriers to physical therapy: age, chronicity    GOALS: Short Term Goals:  6 weeks  1.Report decreased low back pain  < / =  2/10  to increase tolerance for exercise  2. Increase ROM by 10 degrees where limited in order to perform ADLs without difficulty.  3. Increase strength by 1/3 MMT grade in gluteal muscles  to increase tolerance for ADL and work activities.  4. Pt to tolerate HEP to improve ROM and independence with ADL's     Long Term Goals: 12 weeks  1.Report decreased low back pain < / = 0/10  to increase tolerance for ADLs  2.Patient goal: improve general mobility  3.Increase strength to 4+/5 in  Gluteal muscles  to increase tolerance for ADL and work activities.    Plan     Outpatient Physical Therapy 1 times weekly for 8 weeks to include the following interventions: manual therapy,  therapeutic exercise, therapeutic activities, and neuromuscular re-education.    Juan F Mitchell, PT, DPT

## 2022-02-10 ENCOUNTER — CLINICAL SUPPORT (OUTPATIENT)
Dept: REHABILITATION | Facility: HOSPITAL | Age: 72
End: 2022-02-10
Payer: MEDICAID

## 2022-02-10 DIAGNOSIS — G89.29 NECK PAIN, CHRONIC: ICD-10-CM

## 2022-02-10 DIAGNOSIS — R29.898 DECREASED ROM OF NECK: ICD-10-CM

## 2022-02-10 DIAGNOSIS — M54.2 NECK PAIN, CHRONIC: ICD-10-CM

## 2022-02-10 PROCEDURE — 97110 THERAPEUTIC EXERCISES: CPT

## 2022-02-10 NOTE — PROGRESS NOTES
"  Physical Therapy Daily Treatment Note     Name: Chantal Israel [place]  Clinic Number: [id number]    Therapy Diagnosis:   Encounter Diagnoses   Name Primary?    Neck pain, chronic     Decreased ROM of neck      Physician: Bina Leach II, MD    Visit Date: 2/10/2022    Physician Orders:  PT Eval and Treat   Medical Diagnosis: M25.519 (ICD-10-CM) - Pain in joint, shoulder region  Evaluation Date: 10/04/2021  Authorization Period Expiration: 1/31/2021  Plan of Care Certification Period: 12/03/2021  Visit #/Visits authorized: 8/12    Time In: 13:14  Time Out: 14:02  Total Billable Time: 48 minutes    Units   TE: 3    Precautions: Standard and Diabetes    Subjective     Pt reports: no pain today in his neck. Feels like her neck has improved with rom and no pain recently.      She was not compliant with home exercise program.  Response to previous treatment: no adverse reactions  Functional change: independent with HEP    Pain: 1/10  Location: left shoulder      Objective   **All charges billed as Therapeutic Exercises**    Chantal received therapeutic exercises to develop strength, endurance, ROM, posture and core stabilization for 31 minutes including:  Chin tucks 40x5"  Cervical rotations 30x ea  Wall slides 30x  scap retractions orange TB 3x10x5"  Open book 20x ea  No moneys orange TB 3x12    Not Performed  Thoracic ext in chair 30x  Pulleys x 5 minutes for mobility  B ER with OTB with scapular retractions 3 x 10   UBE x 4 min for cardiovascular training and endurance  AAROM flexion 3 x 10 min  AAROM ER 3 x 10 B   S/L ER with 1# weight 2 x 10 B   Scapular retractions x 2 min  Functional IR stretch with towel x 10 B  Wall slides 2 x 10     Pt education on compliance with HEP, pain science, and the importance of staying active.     Chantal received the following manual therapy techniques: Joint mobilizations and Soft tissue Mobilization were applied to the: L shoulder/neck for 13 minutes, including:  Cervical spine " assessment  Shoulder mobility assessment  Sub occipital release - NP  Right side glides C2-4  Cervical rot MET - NP  PT education    Chantal participated in neuromuscular re-education activities to improve: Balance, Coordination, Proprioception and Posture for 00 minutes. The following activities were included:      Chantal participated in dynamic functional therapeutic activities to improve functional performance for 00  minutes, including:      Home Exercises Provided and Patient Education Provided     Education provided:   - Pt education on compliance with HEP, pain science, and the importance of staying active.    Written Home Exercises Provided: Patient instructed to cont prior HEP.  Exercises were reviewed and Chantal was able to demonstrate them prior to the end of the session.  Chantal demonstrated good  understanding of the education provided.     See EMR under Patient Instructions for exercises provided prior visit.    Assessment     Chantal had no pain today but presented with decreased cervical rom (R>L). Improved rom following manual techniques. Did well with exercises and progressing with posture stability exercises. Discussed progression with neck pain and potentially next visit last treatment for neck to focus on low back. Will re-assess next visit.     Chantal is progressing well towards her goals.   Pt prognosis is Fair.     Pt will continue to benefit from skilled outpatient physical therapy to address the deficits listed in the problem list box on initial evaluation, provide pt/family education and to maximize pt's level of independence in the home and community environment.     Pt's spiritual, cultural and educational needs considered and pt agreeable to plan of care and goals.     Anticipated barriers to physical therapy: diabetes, fibromyalgia, DDD, HTN    Goals:  Short Term Goals (4 Weeks):   1. Pt will be independent with HEP to supplement PT in improving functional use of L UE.  2. Pt will increase  pain free L shoulder elevation AROM to >/= 140 deg to improve functional mobility of UE  3. Pt will increase L shoulder ER AROM in 0 deg abduction to >/=20 deg to improve functional mobility of UE.  4. Pt will lift 4 lb objects without pain to promote functional QOL.     Long Term Goals (8 Weeks):   1. Pt will improve FOTO score to </= 41% limited to decrease perceived limitation with carrying, moving, and handling objects.  2. Pt will increase shoulder flexion AROM to WFL in all planes to improve functional use of L RUE.  3. Pt will increase shoulder ER AROM to WFL in all planes to improve functional use of L RUE.  4. Pt will lift 8 lb objects without pain to promote functional QOL.  5. Pt to report pain </= 3/10 with ADLs and IADLs using L UE to improve functional QOL.    Plan   Plan of care Certification: 11/23/2021 to 1/31/21.    OP PT 1x/week for 4 weeks to work on neck rom, strength, and mobility.     Jaya Christie, PT

## 2022-02-15 ENCOUNTER — CLINICAL SUPPORT (OUTPATIENT)
Dept: REHABILITATION | Facility: HOSPITAL | Age: 72
End: 2022-02-15
Payer: MEDICAID

## 2022-02-15 DIAGNOSIS — G89.29 CHRONIC LOW BACK PAIN, UNSPECIFIED BACK PAIN LATERALITY, UNSPECIFIED WHETHER SCIATICA PRESENT: ICD-10-CM

## 2022-02-15 DIAGNOSIS — M54.50 CHRONIC LOW BACK PAIN, UNSPECIFIED BACK PAIN LATERALITY, UNSPECIFIED WHETHER SCIATICA PRESENT: ICD-10-CM

## 2022-02-15 PROCEDURE — 97110 THERAPEUTIC EXERCISES: CPT | Mod: CQ

## 2022-02-15 NOTE — PROGRESS NOTES
"  Physical Therapy Daily Treatment Note     Name: Chantal Israel Campbell  Clinic Number: [id number]    Therapy Diagnosis:   Encounter Diagnosis   Name Primary?    Chronic low back pain, unspecified back pain laterality, unspecified whether sciatica present      Physician: Bina Leach II, MD    Visit Date: 2/15/2022    Physician Orders: PT Eval and Treat   Medical Diagnosis from Referral: M54.9 (ICD-10-CM) - Back pain  Evaluation Date: 1/10/2022  Authorization Period Expiration: 07/31/2022  Plan of Care Expiration: 4/31/2022  Visit # / Visits authorized: 9/12     Time In: 1409  Time Out: 1505  Total Billable Time: 23 minutes    Precautions: Standard    Subjective     Pt reports: Her pain meds aren't helping. She rates her pain as "nine and three quarters".     She was compliant with home exercise program.  Response to previous treatment: No adverse effects  Functional change: N/A    Pain: 9.75/10  Location: lumbar region     Objective     Began tx with MHP to thoracolumbar region x 10'.    Chantal received therapeutic exercises to develop strength, endurance, ROM and flexibility for 00 minutes including:      Chantal received the following manual therapy techniques: Joint mobilizations were applied for 00 minutes including:    Gross lumbar distraction  Lumbar flexion mobilizations IV  Lower lumbar rotation MET R    Chantal participated in neuromuscular re-education activities to improve Coordination, Posture and Core Stablization for 40 minutes. The following activities were included:    DKTC with ball 4 x 10  SB rotation 4 x 10  SL thoracic rotation 20x - np  Slump sliders 2 x 10 sergey  Abdominal brace 10 x 10"  PPT 10" hold x 4'  Glute sets 10" hold x 4'  FMP thoracolumbar flexion - np  Bridges OTB 2 x 10 x 5" - np  Hip ADD iso 10" hold x 4'  Supine clamshell 10" hold x 4'  Pt education: HEP compliance/frequency    Chantal participated in dynamic functional therapeutic activities to improve functional performance for 00 " minutes including:      Home Exercises Provided and Patient Education Provided     Education provided:   - Diagnosis and prognosis    Written Home Exercises Provided: yes.  Exercises were reviewed and Chantal was able to demonstrate them prior to the end of the session.  Chantal demonstrated good  understanding of the education provided.     See EMR under Patient Instructions for exercises provided 1/10/22.    Assessment     Today's tx was very limited d/t sig pain upon arrival. Will assess next tx and progress as tolerated. She reported feeling 7/10 pain upon departure.     Requires baseline lumbo pelvic strength, control, and mobility prior to progression.    Chantal Is progressing well towards her goals.   Pt prognosis is Guarded.     Pt will continue to benefit from skilled outpatient physical therapy to address the deficits listed in the problem list box on initial evaluation, provide pt/family education and to maximize pt's level of independence in the home and community environment.     Pt's spiritual, cultural and educational needs considered and pt agreeable to plan of care and goals.     Anticipated barriers to physical therapy: age, chronicity    GOALS: Short Term Goals:  6 weeks  1.Report decreased low back pain  < / =  2/10  to increase tolerance for exercise  2. Increase ROM by 10 degrees where limited in order to perform ADLs without difficulty.  3. Increase strength by 1/3 MMT grade in gluteal muscles  to increase tolerance for ADL and work activities.  4. Pt to tolerate HEP to improve ROM and independence with ADL's     Long Term Goals: 12 weeks  1.Report decreased low back pain < / = 0/10  to increase tolerance for ADLs  2.Patient goal: improve general mobility  3.Increase strength to 4+/5 in  Gluteal muscles  to increase tolerance for ADL and work activities.    Plan     Outpatient Physical Therapy 1 times weekly for 8 weeks to include the following interventions: manual therapy, therapeutic exercise,  therapeutic activities, and neuromuscular re-education.    Jayne Price, PTA

## 2022-02-21 DIAGNOSIS — M51.46 SCHMORL'S NODES, LUMBAR REGION: Primary | ICD-10-CM

## 2022-02-22 ENCOUNTER — CLINICAL SUPPORT (OUTPATIENT)
Dept: REHABILITATION | Facility: HOSPITAL | Age: 72
End: 2022-02-22
Payer: MEDICAID

## 2022-02-22 DIAGNOSIS — G89.29 CHRONIC LOW BACK PAIN, UNSPECIFIED BACK PAIN LATERALITY, UNSPECIFIED WHETHER SCIATICA PRESENT: Primary | ICD-10-CM

## 2022-02-22 DIAGNOSIS — M54.50 CHRONIC LOW BACK PAIN, UNSPECIFIED BACK PAIN LATERALITY, UNSPECIFIED WHETHER SCIATICA PRESENT: Primary | ICD-10-CM

## 2022-02-22 PROCEDURE — 97110 THERAPEUTIC EXERCISES: CPT | Mod: CQ

## 2022-02-22 NOTE — PROGRESS NOTES
"  Physical Therapy Daily Treatment Note     Name: Chantal Israel Lanse  Clinic Number: [id number]    Therapy Diagnosis:   Encounter Diagnosis   Name Primary?    Chronic low back pain, unspecified back pain laterality, unspecified whether sciatica present Yes     Physician: Bina Leach II, MD    Visit Date: 2/22/2022    Physician Orders: PT Eval and Treat   Medical Diagnosis from Referral: M54.9 (ICD-10-CM) - Back pain  Evaluation Date: 1/10/2022  Authorization Period Expiration: 07/31/2022  Plan of Care Expiration: 4/31/2022  Visit # / Visits authorized: 10/12     Time In: 1409  Time Out: 1505  Total Billable Time: 23 minutes    Precautions: Standard    Subjective     Pt reports: Pt returns to therapy this pm with c/o sig lumbar pain upon entry. The narcotics aren't helping, but she is taking them every day. She was emotional about her current levels of pain.     She was compliant with home exercise program.  Response to previous treatment: No adverse effects  Functional change: N/A    Pain: 9.75/10  Location: lumbar region     Objective     Began tx with MHP to thoracolumbar region x 10'.    Chantal received therapeutic exercises to develop strength, endurance, ROM and flexibility for 00 minutes including:      Chantal received the following manual therapy techniques: Joint mobilizations were applied for 00 minutes including:    Gross lumbar distraction  Lumbar flexion mobilizations IV  Lower lumbar rotation MET R    Chantal participated in neuromuscular re-education activities to improve Coordination, Posture and Core Stablization for 40 minutes. The following activities were included:    DKTC with ball 4 x 10 - np  SB rotation 4 x 10 - np  SL thoracic rotation 20x - np  Slump sliders 2 x 10 sergey  Abdominal brace 10 x 10"  PPT 10" hold x 4'  Glute sets 10" hold x 4'  FMP thoracolumbar flexion - np  Bridges OTB 2 x 10 x 5" - np  Hip ADD iso 10" hold x 4'  Supine clamshell 10" hold x 4'  Pt education: HEP " compliance/frequency    Chantal participated in dynamic functional therapeutic activities to improve functional performance for 00 minutes including:      Home Exercises Provided and Patient Education Provided     Education provided:   - Diagnosis and prognosis    Written Home Exercises Provided: yes.  Exercises were reviewed and Chantal was able to demonstrate them prior to the end of the session.  Chantal demonstrated good  understanding of the education provided.     See EMR under Patient Instructions for exercises provided 1/10/22.    Assessment     Today's tx was again limited d/t sig pain upon arrival. Will trial dry needling per pt request and splitting time with aquatics.     Requires baseline lumbo pelvic strength, control, and mobility prior to progression.    Chantal Is progressing well towards her goals.   Pt prognosis is Guarded.     Pt will continue to benefit from skilled outpatient physical therapy to address the deficits listed in the problem list box on initial evaluation, provide pt/family education and to maximize pt's level of independence in the home and community environment.     Pt's spiritual, cultural and educational needs considered and pt agreeable to plan of care and goals.     Anticipated barriers to physical therapy: age, chronicity    GOALS: Short Term Goals:  6 weeks  1.Report decreased low back pain  < / =  2/10  to increase tolerance for exercise  2. Increase ROM by 10 degrees where limited in order to perform ADLs without difficulty.  3. Increase strength by 1/3 MMT grade in gluteal muscles  to increase tolerance for ADL and work activities.  4. Pt to tolerate HEP to improve ROM and independence with ADL's     Long Term Goals: 12 weeks  1.Report decreased low back pain < / = 0/10  to increase tolerance for ADLs  2.Patient goal: improve general mobility  3.Increase strength to 4+/5 in  Gluteal muscles  to increase tolerance for ADL and work activities.    Plan     Outpatient Physical  Therapy 1 times weekly for 8 weeks to include the following interventions: manual therapy, therapeutic exercise, therapeutic activities, and neuromuscular re-education.    Jayne Price, PTA

## 2022-02-24 ENCOUNTER — CLINICAL SUPPORT (OUTPATIENT)
Dept: REHABILITATION | Facility: HOSPITAL | Age: 72
End: 2022-02-24
Payer: MEDICAID

## 2022-02-24 DIAGNOSIS — G89.29 NECK PAIN, CHRONIC: Primary | ICD-10-CM

## 2022-02-24 DIAGNOSIS — R29.898 DECREASED ROM OF NECK: ICD-10-CM

## 2022-02-24 DIAGNOSIS — M54.2 NECK PAIN, CHRONIC: Primary | ICD-10-CM

## 2022-02-24 PROCEDURE — 97110 THERAPEUTIC EXERCISES: CPT

## 2022-02-24 NOTE — PROGRESS NOTES
"    Physical Therapy Daily Treatment Note     Name: Chantal Israel Butternut  Clinic Number: [id number]    Therapy Diagnosis:   Encounter Diagnoses   Name Primary?    Neck pain, chronic Yes    Decreased ROM of neck      Physician: Bina Leach II, MD    Visit Date: 2/24/2022    Physician Orders:  PT Eval and Treat   Medical Diagnosis: M25.519 (ICD-10-CM) - Pain in joint, shoulder region  Evaluation Date: 10/04/2021  Authorization Period Expiration: 1/31/2021  Plan of Care Certification Period: 12/03/2021  Visit #/Visits authorized: 11/12    Time In: 13:11  Time Out: 14:02  Total Billable Time: 25 minutes    Units   TE: 2    Precautions: Standard and Diabetes    Subjective     Pt reports: her neck was bothering her a little today. She "felt some clicking/popping" but was not painful.     She was not compliant with home exercise program.  Response to previous treatment: no adverse reactions  Functional change: independent with HEP    Pain: 1/10  Location: left shoulder      Objective   **All charges billed as Therapeutic Exercises**    Chantal received therapeutic exercises to develop strength, endurance, ROM, posture and core stabilization for 32 minutes including:  Chin tucks 40x5"  Cervical rotations 30x ea  Wall slides 30x  scap retractions orange TB 3x10x5"  Open book 20x ea  No moneys orange TB 3x12    Not Performed  Thoracic ext in chair 30x  Pulleys x 5 minutes for mobility  B ER with OTB with scapular retractions 3 x 10   UBE x 4 min for cardiovascular training and endurance  AAROM flexion 3 x 10 min  AAROM ER 3 x 10 B   S/L ER with 1# weight 2 x 10 B   Scapular retractions x 2 min  Functional IR stretch with towel x 10 B  Wall slides 2 x 10     Pt education on compliance with HEP, pain science, and the importance of staying active.     Chantal received the following manual therapy techniques: Joint mobilizations and Soft tissue Mobilization were applied to the: L shoulder/neck for 13 minutes, including:  Cervical " spine assessment  Shoulder mobility assessment  Sub occipital release - NP  Right side glides C2-4  Cervical rot MET - NP  PT education    Chantal participated in neuromuscular re-education activities to improve: Balance, Coordination, Proprioception and Posture for 00 minutes. The following activities were included:      Chantal participated in dynamic functional therapeutic activities to improve functional performance for 00  minutes, including:      Home Exercises Provided and Patient Education Provided     Education provided:   - Pt education on compliance with HEP, pain science, and the importance of staying active.    Written Home Exercises Provided: Patient instructed to cont prior HEP.  Exercises were reviewed and Chantal was able to demonstrate them prior to the end of the session.  Chantal demonstrated good  understanding of the education provided.     See EMR under Patient Instructions for exercises provided prior visit.    Assessment     Chantal had some slight neck pain today and had a reduction and improved cervical rom following manual techniques. Discussed transitioning to HEP and performing exercises at home for her neck. She has been complaining of increased pain in her back and into her leg and would like to focus on her back. We agreed to focus PT session on her low back/leg pain.     Chantal is progressing well towards her goals.   Pt prognosis is Fair.     Pt will continue to benefit from skilled outpatient physical therapy to address the deficits listed in the problem list box on initial evaluation, provide pt/family education and to maximize pt's level of independence in the home and community environment.     Pt's spiritual, cultural and educational needs considered and pt agreeable to plan of care and goals.     Anticipated barriers to physical therapy: diabetes, fibromyalgia, DDD, HTN    Goals:  Short Term Goals (4 Weeks):   1. Pt will be independent with HEP to supplement PT in improving functional  use of L UE.  2. Pt will increase pain free L shoulder elevation AROM to >/= 140 deg to improve functional mobility of UE  3. Pt will increase L shoulder ER AROM in 0 deg abduction to >/=20 deg to improve functional mobility of UE.  4. Pt will lift 4 lb objects without pain to promote functional QOL.     Long Term Goals (8 Weeks):   1. Pt will improve FOTO score to </= 41% limited to decrease perceived limitation with carrying, moving, and handling objects.  2. Pt will increase shoulder flexion AROM to WFL in all planes to improve functional use of L RUE.  3. Pt will increase shoulder ER AROM to WFL in all planes to improve functional use of L RUE.  4. Pt will lift 8 lb objects without pain to promote functional QOL.  5. Pt to report pain </= 3/10 with ADLs and IADLs using L UE to improve functional QOL.    Plan   Plan of care Certification: 11/23/2021 to 1/31/21.    Transition to lumbar/LE to focus on pain, rom, and core stability. Transition cervical/neck to HEP.     Jaya Christie, PT

## 2022-03-02 ENCOUNTER — CLINICAL SUPPORT (OUTPATIENT)
Dept: REHABILITATION | Facility: HOSPITAL | Age: 72
End: 2022-03-02
Payer: MEDICAID

## 2022-03-02 DIAGNOSIS — G89.29 CHRONIC LOW BACK PAIN WITH SCIATICA, SCIATICA LATERALITY UNSPECIFIED, UNSPECIFIED BACK PAIN LATERALITY: Primary | ICD-10-CM

## 2022-03-02 DIAGNOSIS — M54.40 CHRONIC LOW BACK PAIN WITH SCIATICA, SCIATICA LATERALITY UNSPECIFIED, UNSPECIFIED BACK PAIN LATERALITY: Primary | ICD-10-CM

## 2022-03-02 PROCEDURE — 97110 THERAPEUTIC EXERCISES: CPT | Mod: CQ

## 2022-03-02 NOTE — PROGRESS NOTES
"  Physical Therapy Daily Treatment Note     Name: Chantal Israel Blair  Clinic Number: [id number]    Therapy Diagnosis:   Encounter Diagnosis   Name Primary?    Chronic low back pain with sciatica, sciatica laterality unspecified, unspecified back pain laterality Yes     Physician: Bina Leach II, MD    Visit Date: 3/2/2022    Physician Orders: PT Eval and Treat   Medical Diagnosis from Referral: M54.9 (ICD-10-CM) - Back pain  Evaluation Date: 1/10/2022  Authorization Period Expiration: 07/31/2022  Plan of Care Expiration: 4/31/2022  Visit # / Visits authorized: 12/12     Time In: 1109  Time Out: 1200  Total Billable Time: 23 minutes    Precautions: Standard    Subjective     Pt reports: Pt returns to therapy this pm with c/o sig lumbar pain upon entry.     She was compliant with home exercise program.  Response to previous treatment: No adverse effects  Functional change: N/A    Pain: 9.75/10  Location: lumbar region     Objective     Began tx with MHP to thoracolumbar region x 10'.    Chantal received therapeutic exercises to develop strength, endurance, ROM and flexibility for 30 minutes including:    PPT 15 x 10"  Bridges OTB 3 x 10 x 3"  Supine clam OTB 15 x 10"  Slump sliders 2 x 10 sergey  Pt education: HEP compliance    Chantal received the following manual therapy techniques: Joint mobilizations were applied for 00 minutes including:    Gross lumbar distraction  Lumbar flexion mobilizations IV  Lower lumbar rotation MET R      Home Exercises Provided and Patient Education Provided     Education provided:   - Diagnosis and prognosis    Written Home Exercises Provided: yes.  Exercises were reviewed and Chantal was able to demonstrate them prior to the end of the session.  Chantal demonstrated good  understanding of the education provided.     See EMR under Patient Instructions for exercises provided 1/10/22.    Assessment     She reported increased pain today, so tx was limited to basic mat exercises. She begins aquatic " therapy next week.     Requires baseline lumbo pelvic strength, control, and mobility prior to progression.    Chantal Is progressing well towards her goals.   Pt prognosis is Guarded.     Pt will continue to benefit from skilled outpatient physical therapy to address the deficits listed in the problem list box on initial evaluation, provide pt/family education and to maximize pt's level of independence in the home and community environment.     Pt's spiritual, cultural and educational needs considered and pt agreeable to plan of care and goals.     Anticipated barriers to physical therapy: age, chronicity    GOALS: Short Term Goals:  6 weeks  1.Report decreased low back pain  < / =  2/10  to increase tolerance for exercise  2. Increase ROM by 10 degrees where limited in order to perform ADLs without difficulty.  3. Increase strength by 1/3 MMT grade in gluteal muscles  to increase tolerance for ADL and work activities.  4. Pt to tolerate HEP to improve ROM and independence with ADL's     Long Term Goals: 12 weeks  1.Report decreased low back pain < / = 0/10  to increase tolerance for ADLs  2.Patient goal: improve general mobility  3.Increase strength to 4+/5 in  Gluteal muscles  to increase tolerance for ADL and work activities.    Plan     Outpatient Physical Therapy 1 times weekly for 8 weeks to include the following interventions: manual therapy, therapeutic exercise, therapeutic activities, and neuromuscular re-education.    Jayne Price, PTA

## 2022-03-07 ENCOUNTER — CLINICAL SUPPORT (OUTPATIENT)
Dept: REHABILITATION | Facility: HOSPITAL | Age: 72
End: 2022-03-07
Payer: MEDICAID

## 2022-03-07 DIAGNOSIS — M54.50 CHRONIC BILATERAL LOW BACK PAIN, UNSPECIFIED WHETHER SCIATICA PRESENT: Primary | ICD-10-CM

## 2022-03-07 DIAGNOSIS — G89.29 CHRONIC BILATERAL LOW BACK PAIN, UNSPECIFIED WHETHER SCIATICA PRESENT: Primary | ICD-10-CM

## 2022-03-07 PROCEDURE — 97110 THERAPEUTIC EXERCISES: CPT

## 2022-03-07 NOTE — PROGRESS NOTES
[facility]   Physical Therapy Treatment Note     Name: Chantal Israel Davis  Clinic Number: [id number]    Therapy Diagnosis:   Encounter Diagnosis   Name Primary?    Chronic bilateral low back pain, unspecified whether sciatica present Yes     Physician: Bina Leach II, MD    Visit Date: 3/7/2022    Physician Orders: PT Eval and Treat   Medical Diagnosis from Referral: M54.9 (ICD-10-CM) - Back pain  Evaluation Date: 1/10/2022  Authorization Period Expiration: 07/31/2022  Plan of Care Expiration: 4/31/2022  Visit # / Visits authorized: 13/12   PTA Visit #: 0/5     Time In: 1:35 pm  Time Out: 2:35 pm    Total Billable Time: 30 minutes    SUBJECTIVE     Pt reports: she almost went to the ER due to her pain. She stated she would have gone if she thought it would help.  She took a Percocet around 8:00 to try to help with the pain.    She was compliant with home exercise program.    Response to previous treatment: first pool visit    Functional change: initiated aquatic PT    Pain: 10/10  Location: from her Waist all the way down B LE (R greater than L)    OBJECTIVE     Objective Measures updated at progress report unless specified.     Treatment     Chantal received aquatic therapeutic exercises to develop strength, endurance, ROM, flexibility, posture, and core stabilization for 60 minutes including:    FUNCTIONAL MOBILITY TRAINING x 2 laps each at beginning and 1 lap each at end of session  Walk forward/backward/lateral    STRETCHES 2 x 30sec  HS--add as tolerated    LE EX x 20  Heel raise into Squat  Hip abduction  Hip flex/ext  LAQ--attempted but painful on the right  HS curl  Standing tripod clam    Walking Marches--add as tolerated    Sit to stand from white pool stool--add as tolerated    UE EX/CORE  x 20  Shoulder flex/ext TA activation paddles open--add as tolerated  Shoulder horizontal abd/add TA activation paddles open--Add as tolerated  Mini squat with push/pull red  kickboard    ENDURANCE and // bar exercises  Bicycle in // bars 5'  LTR x 2'  DKTC x 20 reps  Flips x 20 reps  Scissor kicks    Patient Education and Home Exercises     Home Exercises Provided and Patient Education Provided     Education provided:   Role of aquatic therapy  Hydration post therapy    Written Home Exercises Provided: Patient instructed to cont prior HEP. Exercises were reviewed and Chantal was able to demonstrate them prior to the end of the session.  Chantal demonstrated good  understanding of the education provided. See EMR under Patient Instructions for exercises provided during therapy sessions    ASSESSMENT     The patient overall tolerated the LE strengthening exercises fair with reports of LBP. Therefore the majority of the session was focused on mobility and endurance. She reported improved mobility following the session.     Chantal Is progressing well towards her goals.     Pt prognosis is Fair.     Pt will continue to benefit from skilled outpatient physical therapy to address the deficits listed in the problem list box on initial evaluation, provide pt/family education and to maximize pt's level of independence in the home and community environment.     Pt's spiritual, cultural and educational needs considered and pt agreeable to plan of care and goals.     Anticipated barriers to physical therapy: chronicity of pain    GOALS: Short Term Goals:  6 weeks  1.Report decreased low back pain  < / =  2/10  to increase tolerance for exercise  2. Increase ROM by 10 degrees where limited in order to perform ADLs without difficulty.  3. Increase strength by 1/3 MMT grade in gluteal muscles  to increase tolerance for ADL and work activities.  4. Pt to tolerate HEP to improve ROM and independence with ADL's     Long Term Goals: 12 weeks  1.Report decreased low back pain < / = 0/10  to increase tolerance for ADLs  2.Patient goal: improve general mobility  3.Increase strength to 4+/5 in  Gluteal muscles  to  increase tolerance for ADL and work activities.    PLAN   Progress POC as tolerated by patient.     Carolee Peter, PT

## 2022-03-10 ENCOUNTER — CLINICAL SUPPORT (OUTPATIENT)
Dept: REHABILITATION | Facility: HOSPITAL | Age: 72
End: 2022-03-10
Payer: MEDICAID

## 2022-03-10 DIAGNOSIS — M54.50 CHRONIC LOW BACK PAIN, UNSPECIFIED BACK PAIN LATERALITY, UNSPECIFIED WHETHER SCIATICA PRESENT: Primary | ICD-10-CM

## 2022-03-10 DIAGNOSIS — G89.29 CHRONIC LOW BACK PAIN, UNSPECIFIED BACK PAIN LATERALITY, UNSPECIFIED WHETHER SCIATICA PRESENT: Primary | ICD-10-CM

## 2022-03-10 PROCEDURE — 97110 THERAPEUTIC EXERCISES: CPT | Mod: CQ

## 2022-03-10 NOTE — PROGRESS NOTES
"  Physical Therapy Daily Treatment Note     Name: Chantal [place]  Clinic Number: [id number]    Therapy Diagnosis:   Encounter Diagnosis   Name Primary?    Chronic low back pain, unspecified back pain laterality, unspecified whether sciatica present Yes     Physician: Bina Leach II, MD    Visit Date: 3/10/2022    Physician Orders: PT Eval and Treat   Medical Diagnosis from Referral: M54.9 (ICD-10-CM) - Back pain  Evaluation Date: 1/10/2022  Authorization Period Expiration: 07/31/2022  Plan of Care Expiration: 4/31/2022  Visit # / Visits authorized: 14/24    Time In: 1407  Time Out: 1502  Total Billable Time: 38 minutes    Precautions: Standard    Subjective     Pt reports: Pt returns to therapy this pm with c/o sig lumbar pain upon entry. Nothing is helping.     She was compliant with home exercise program.  Response to previous treatment: No adverse effects  Functional change: N/A    Pain: 9.75/10  Location: lumbar region     Objective     Began tx with MHP to thoracolumbar region x 10'.    Chantal received therapeutic exercises to develop strength, endurance, ROM and flexibility for 38 minutes including:    PPT 10" hold x 5'  Bridges OTB 3 x 10 x 3" - np  Supine hip ADD 5" hold x 5'  Supine clam OTB 10" hold x 5'  Slump sliders 2 x 10 sergey - np  Rec bike lvl 2 x 8' for cardiovascular endurance/neuropathy  Pt education: HEP compliance    Chantal received the following manual therapy techniques: Joint mobilizations were applied for 00 minutes including:    Gross lumbar distraction  Lumbar flexion mobilizations IV  Lower lumbar rotation MET R      Home Exercises Provided and Patient Education Provided     Education provided:   - Diagnosis and prognosis    Written Home Exercises Provided: yes.  Exercises were reviewed and Chantal was able to demonstrate them prior to the end of the session.  Chantal demonstrated good  understanding of the education provided.     See EMR under Patient Instructions for exercises provided " 1/10/22.    Assessment     Continued reports of sig pain. Advised pt to reach out to PCP for referral to specialist. She is interested in pain management, healthy back, and finding an orthopedist.     Chantal Is progressing well towards her goals.   Pt prognosis is Guarded.     Pt will continue to benefit from skilled outpatient physical therapy to address the deficits listed in the problem list box on initial evaluation, provide pt/family education and to maximize pt's level of independence in the home and community environment.     Pt's spiritual, cultural and educational needs considered and pt agreeable to plan of care and goals.     Anticipated barriers to physical therapy: age, chronicity    GOALS: Short Term Goals:  6 weeks  1.Report decreased low back pain  < / =  2/10  to increase tolerance for exercise  2. Increase ROM by 10 degrees where limited in order to perform ADLs without difficulty.  3. Increase strength by 1/3 MMT grade in gluteal muscles  to increase tolerance for ADL and work activities.  4. Pt to tolerate HEP to improve ROM and independence with ADL's     Long Term Goals: 12 weeks  1.Report decreased low back pain < / = 0/10  to increase tolerance for ADLs  2.Patient goal: improve general mobility  3.Increase strength to 4+/5 in  Gluteal muscles  to increase tolerance for ADL and work activities.    Plan     Outpatient Physical Therapy 1 times weekly for 8 weeks to include the following interventions: manual therapy, therapeutic exercise, therapeutic activities, and neuromuscular re-education.    Jayne Price, PTA

## 2022-03-15 ENCOUNTER — CLINICAL SUPPORT (OUTPATIENT)
Dept: REHABILITATION | Facility: HOSPITAL | Age: 72
End: 2022-03-15
Payer: MEDICAID

## 2022-03-15 DIAGNOSIS — M54.50 LOW BACK PAIN, UNSPECIFIED BACK PAIN LATERALITY, UNSPECIFIED CHRONICITY, UNSPECIFIED WHETHER SCIATICA PRESENT: Primary | ICD-10-CM

## 2022-03-15 PROCEDURE — 97110 THERAPEUTIC EXERCISES: CPT

## 2022-03-15 NOTE — PROGRESS NOTES
[facility]   Physical Therapy Treatment Note     Name: Chantal [place]  Clinic Number: [id number]    Therapy Diagnosis:   Encounter Diagnosis   Name Primary?    Low back pain, unspecified back pain laterality, unspecified chronicity, unspecified whether sciatica present Yes     Physician: Bina Leach II, MD    Visit Date: 3/15/2022    Physician Orders: PT Eval and Treat   Medical Diagnosis from Referral: M54.9 (ICD-10-CM) - Back pain  Evaluation Date: 1/10/2022  Authorization Period Expiration: 07/31/2022  Plan of Care Expiration: 4/31/2022  Visit # / Visits authorized: 15/24  PTA Visit #: 0/5     Time In: 1:30 pm  Time Out: 2:30 pm    Total Billable Time: 60 minutes    SUBJECTIVE     Pt reports: taking a Percocet earlier this morning to decrease the pain but minimal effect noted. Pt has difficulty getting in/out of the chair due to the pain of Bi LE.     She was compliant with home exercise program.    Response to previous treatment: first pool visit    Functional change: initiated aquatic PT    Pain: 10/10  Location: from her Waist all the way down B LE (R greater than L)    OBJECTIVE     Objective Measures updated at progress report unless specified.     Treatment     Chantal received aquatic therapeutic exercises to develop strength, endurance, ROM, flexibility, posture, and core stabilization for 60 minutes including:    FUNCTIONAL MOBILITY TRAINING x 2 laps each at beginning and 1 lap each at end of session  Walk forward/backward/lateral    STRETCHES 2 x 30sec  HS--add as tolerated    LE EX x 20  Heel raise into Squat  Hip abduction  Hip flex/ext  LAQ--attempted but painful on the right  HS curl  Standing tripod clam    Walking Marches--add as tolerated    Sit to stand from white pool stool--add as tolerated    UE EX/CORE  x 20  Shoulder flex/ext TA activation paddles open--add as tolerated  Shoulder horizontal abd/add TA activation paddles open--Add as tolerated  Mini squat  with push/pull red kickboard    ENDURANCE and // bar exercises  Bicycle in // bars 5'  LTR x 2'  DKTC x 20 reps  Flips x 20 reps  Scissor kicks    Patient Education and Home Exercises     Home Exercises Provided and Patient Education Provided     Education provided:   Role of aquatic therapy  Hydration post therapy    Written Home Exercises Provided: Patient instructed to cont prior HEP. Exercises were reviewed and Chantal was able to demonstrate them prior to the end of the session.  Chantal demonstrated good  understanding of the education provided. See EMR under Patient Instructions for exercises provided during therapy sessions    ASSESSMENT     Patient was only able to tolerate hip flex/ext X 10 on each side due to increase in SI/lumbar pain. Pt tolerates // bar endurance exercises best throughout the entire aquatic PT session.     Chantal Is progressing well towards her goals.     Pt prognosis is Fair.     Pt will continue to benefit from skilled outpatient physical therapy to address the deficits listed in the problem list box on initial evaluation, provide pt/family education and to maximize pt's level of independence in the home and community environment.     Pt's spiritual, cultural and educational needs considered and pt agreeable to plan of care and goals.     Anticipated barriers to physical therapy: chronicity of pain    GOALS: Short Term Goals:  6 weeks  1.Report decreased low back pain  < / =  2/10  to increase tolerance for exercise  2. Increase ROM by 10 degrees where limited in order to perform ADLs without difficulty.  3. Increase strength by 1/3 MMT grade in gluteal muscles  to increase tolerance for ADL and work activities.  4. Pt to tolerate HEP to improve ROM and independence with ADL's     Long Term Goals: 12 weeks  1.Report decreased low back pain < / = 0/10  to increase tolerance for ADLs  2.Patient goal: improve general mobility  3.Increase strength to 4+/5 in  Gluteal muscles  to increase  tolerance for ADL and work activities.    PLAN   Progress POC as tolerated by patient.     Cristóbal Macedo, PT

## 2022-03-17 ENCOUNTER — CLINICAL SUPPORT (OUTPATIENT)
Dept: REHABILITATION | Facility: HOSPITAL | Age: 72
End: 2022-03-17
Payer: MEDICAID

## 2022-03-17 DIAGNOSIS — M54.50 CHRONIC MIDLINE LOW BACK PAIN, UNSPECIFIED WHETHER SCIATICA PRESENT: Primary | ICD-10-CM

## 2022-03-17 DIAGNOSIS — G89.29 CHRONIC MIDLINE LOW BACK PAIN, UNSPECIFIED WHETHER SCIATICA PRESENT: Primary | ICD-10-CM

## 2022-03-17 PROCEDURE — 97140 MANUAL THERAPY 1/> REGIONS: CPT | Performed by: PHYSICAL THERAPIST

## 2022-03-17 NOTE — PROGRESS NOTES
"  Physical Therapy Daily Treatment Note     Name: Chantal [place]  Clinic Number: [id number]    Therapy Diagnosis:   Encounter Diagnosis   Name Primary?    Chronic midline low back pain, unspecified whether sciatica present Yes     Physician: Bina Leach II, MD    Visit Date: 3/17/2022    Physician Orders: PT Eval and Treat   Medical Diagnosis from Referral: M54.9 (ICD-10-CM) - Back pain  Evaluation Date: 1/10/2022  Authorization Period Expiration: 07/31/2022  Plan of Care Expiration: 4/31/2022  Visit # / Visits authorized: 15/24    Time In: 1300  Time Out: 1402  Total Billable Time: 15 minutes    Precautions: Standard    Subjective     Pt reports: Still has a lot of back pain but is not completing exercises at home. Pool helps for a short time but overall feels the same. Wants to trial DN.     She was compliant with home exercise program.  Response to previous treatment: No adverse effects  Functional change: N/A    Pain: 9.75/10  Location: lumbar region     Objective     Began tx with MHP to thoracolumbar region x 10'.    Chantal received therapeutic exercises to develop strength, endurance, ROM and flexibility for 00 minutes including:    PPT 10" hold x 5'  Bridges OTB 3 x 10 x 3" - np  Supine hip ADD 5" hold x 5'  Supine clam OTB 10" hold x 5'  Slump sliders 2 x 10 sergey - np  Rec bike lvl 2 x 8' for cardiovascular endurance/neuropathy  Pt education: HEP compliance    Chantal received the following manual therapy techniques: Joint mobilizations were applied for 15 minutes including:  Assessment of lumbar spine ROM and joint mobility.  Palpation of lumbar spine musculature for trigger points. Trigger points were treated with dry needling. Needles were wound 2x and attached to ESTIM, left in situ for 5 minutes      Home Exercises Provided and Patient Education Provided     Education provided:   - Diagnosis and prognosis    Written Home Exercises Provided: yes.  Exercises were reviewed and Chantal was able to " demonstrate them prior to the end of the session.  Chantal demonstrated good  understanding of the education provided.     See EMR under Patient Instructions for exercises provided 1/10/22.    Assessment     No adverse affects noted with DN. Tolerated treatment well and reported pain relief following. Still needs to improve strengthening and exercise tolerance, hopefully this will improve.    Chantal Is progressing well towards her goals.   Pt prognosis is Guarded.     Pt will continue to benefit from skilled outpatient physical therapy to address the deficits listed in the problem list box on initial evaluation, provide pt/family education and to maximize pt's level of independence in the home and community environment.     Pt's spiritual, cultural and educational needs considered and pt agreeable to plan of care and goals.     Anticipated barriers to physical therapy: age, chronicity    GOALS: Short Term Goals:  6 weeks  1.Report decreased low back pain  < / =  2/10  to increase tolerance for exercise  2. Increase ROM by 10 degrees where limited in order to perform ADLs without difficulty.  3. Increase strength by 1/3 MMT grade in gluteal muscles  to increase tolerance for ADL and work activities.  4. Pt to tolerate HEP to improve ROM and independence with ADL's     Long Term Goals: 12 weeks  1.Report decreased low back pain < / = 0/10  to increase tolerance for ADLs  2.Patient goal: improve general mobility  3.Increase strength to 4+/5 in  Gluteal muscles  to increase tolerance for ADL and work activities.    Plan     Outpatient Physical Therapy 1 times weekly for 8 weeks to include the following interventions: manual therapy, therapeutic exercise, therapeutic activities, and neuromuscular re-education.    Juan F Mitchell, PT, DPT, OCS

## 2022-03-24 ENCOUNTER — CLINICAL SUPPORT (OUTPATIENT)
Dept: REHABILITATION | Facility: HOSPITAL | Age: 72
End: 2022-03-24
Payer: MEDICAID

## 2022-03-24 DIAGNOSIS — M54.50 ACUTE LOW BACK PAIN, UNSPECIFIED BACK PAIN LATERALITY, UNSPECIFIED WHETHER SCIATICA PRESENT: Primary | ICD-10-CM

## 2022-03-24 PROCEDURE — 97110 THERAPEUTIC EXERCISES: CPT | Performed by: PHYSICAL THERAPIST

## 2022-03-28 ENCOUNTER — CLINICAL SUPPORT (OUTPATIENT)
Dept: REHABILITATION | Facility: HOSPITAL | Age: 72
End: 2022-03-28
Payer: MEDICAID

## 2022-03-28 DIAGNOSIS — M54.50 CHRONIC LOW BACK PAIN, UNSPECIFIED BACK PAIN LATERALITY, UNSPECIFIED WHETHER SCIATICA PRESENT: Primary | ICD-10-CM

## 2022-03-28 DIAGNOSIS — G89.29 CHRONIC LOW BACK PAIN, UNSPECIFIED BACK PAIN LATERALITY, UNSPECIFIED WHETHER SCIATICA PRESENT: Primary | ICD-10-CM

## 2022-03-28 NOTE — PROGRESS NOTES
"  Physical Therapy Daily Treatment Note     Name: Chantal Israel [place]  Clinic Number: [id number]    Therapy Diagnosis:   Encounter Diagnosis   Name Primary?    Acute low back pain, unspecified back pain laterality, unspecified whether sciatica present Yes     Physician: Bina Leach II, MD    Visit Date: 3/24/2022    Physician Orders: PT Eval and Treat   Medical Diagnosis from Referral: M54.9 (ICD-10-CM) - Back pain  Evaluation Date: 1/10/2022  Authorization Period Expiration: 07/31/2022  Plan of Care Expiration: 4/31/2022  Visit # / Visits authorized: 14/24    Time In: 48447  Time Out: 1502  Total Billable Time: 15 minutes    Precautions: Standard    Subjective     Pt reports: Had decreased pain following dry needling. Would like to continue this treatment with exercise.    She was compliant with home exercise program.  Response to previous treatment: No adverse effects  Functional change: N/A    Pain: patient did not verbalize number  Location: lumbar region     Objective     Began tx with MHP to thoracolumbar region x 10'.    Chantal received therapeutic exercises to develop strength, endurance, ROM and flexibility for 38 minutes including:    PPT 10" hold x 5'  PPT to slider 20x  Bridges OTB 3 x 10 x 3" - np      Chantal received the following manual therapy techniques: Joint mobilizations were applied for 15 minutes including:    Gross lumbar distraction  Lumbar flexion mobilizations IV  Lower lumbar rotation MET R    Lumbar musculature was palpated for trigger points. Trigger points were treated with dry needling. Needles were wound 2x and attached to ESTIM for 10 minutes    Home Exercises Provided and Patient Education Provided     Education provided:   - Diagnosis and prognosis    Written Home Exercises Provided: yes.  Exercises were reviewed and Chantal was able to demonstrate them prior to the end of the session.  Chantal demonstrated good  understanding of the education provided.     See EMR under Patient " Instructions for exercises provided 1/10/22.    Assessment     Utilized activation exercises with turning and doing fictional activities such as sit to stand to reduce symptoms. This was effective. No adverse affects from DN.     Chantal Is progressing well towards her goals.   Pt prognosis is Guarded.     Pt will continue to benefit from skilled outpatient physical therapy to address the deficits listed in the problem list box on initial evaluation, provide pt/family education and to maximize pt's level of independence in the home and community environment.     Pt's spiritual, cultural and educational needs considered and pt agreeable to plan of care and goals.     Anticipated barriers to physical therapy: age, chronicity    GOALS: Short Term Goals:  6 weeks  1.Report decreased low back pain  < / =  2/10  to increase tolerance for exercise  2. Increase ROM by 10 degrees where limited in order to perform ADLs without difficulty.  3. Increase strength by 1/3 MMT grade in gluteal muscles  to increase tolerance for ADL and work activities.  4. Pt to tolerate HEP to improve ROM and independence with ADL's     Long Term Goals: 12 weeks  1.Report decreased low back pain < / = 0/10  to increase tolerance for ADLs  2.Patient goal: improve general mobility  3.Increase strength to 4+/5 in  Gluteal muscles  to increase tolerance for ADL and work activities.    Plan     Outpatient Physical Therapy 1 times weekly for 8 weeks to include the following interventions: manual therapy, therapeutic exercise, therapeutic activities, and neuromuscular re-education.    Juan F Mitchell, PT, DPT, OCS

## 2022-03-28 NOTE — PROGRESS NOTES
[facility]   Physical Therapy Treatment Note     Name: Chantal [place]  Clinic Number: [id number]    Therapy Diagnosis:   Encounter Diagnosis   Name Primary?    Chronic low back pain, unspecified back pain laterality, unspecified whether sciatica present Yes     Physician: Bina Leach II, MD    Visit Date: 3/28/2022    Physician Orders: PT Eval and Treat   Medical Diagnosis from Referral: M54.9 (ICD-10-CM) - Back pain  Evaluation Date: 1/10/2022  Authorization Period Expiration: 07/31/2022  Plan of Care Expiration: 4/31/2022  Visit # / Visits authorized: 15/24  PTA Visit #: 0/5     Time In: 1:25 pm  Time Out: 2:20 pm     Total Billable Time: 45 minutes    SUBJECTIVE     Pt reports: she is feeling pretty good today.    She was compliant with home exercise program.    Response to previous treatment: soreness    Functional change: initiated aquatic PT    Pain: 10/10  Location: from her Waist all the way down B LE (R greater than L)    OBJECTIVE     Objective Measures updated at progress report unless specified.     Treatment     Chantal received aquatic therapeutic exercises to develop strength, endurance, ROM, flexibility, posture, and core stabilization for 60 minutes including:    FUNCTIONAL MOBILITY TRAINING x 2 laps each at beginning and 1 lap each at end of session  Walk forward/backward/lateral    STRETCHES 2 x 30sec  HS--add as tolerated    LE EX x 20  Heel raise into Squat  Hip abduction  Hip flex/ext  LAQ--attempted but painful on the right  HS curl  Standing tripod clam    Walking Marches in // bars x 2 laps    Sit to stand from white pool stool--add as tolerated    UE EX/CORE  x 20  Shoulder flex/ext TA activation paddles open  Shoulder horizontal abd/add TA activation paddles open  Mini squat with push/pull red kickboard    ENDURANCE and // bar exercises  Bicycle in // bars 5'  LTR x 2'  DKTC x 20 reps  Scissor kicks x 2'    Patient Education and Home Exercises     Home  Exercises Provided and Patient Education Provided     Education provided:   Role of aquatic therapy  Hydration post therapy    Written Home Exercises Provided: Patient instructed to cont prior HEP. Exercises were reviewed and Chantal was able to demonstrate them prior to the end of the session.  Chantal demonstrated good  understanding of the education provided. See EMR under Patient Instructions for exercises provided during therapy sessions    ASSESSMENT     The patient tolerated additional UE/Core strengthening exercises well today in addition to walking marches.     Chantal Is progressing well towards her goals.     Pt prognosis is Fair.     Pt will continue to benefit from skilled outpatient physical therapy to address the deficits listed in the problem list box on initial evaluation, provide pt/family education and to maximize pt's level of independence in the home and community environment.     Pt's spiritual, cultural and educational needs considered and pt agreeable to plan of care and goals.     Anticipated barriers to physical therapy: chronicity of pain    GOALS: Short Term Goals:  6 weeks  1.Report decreased low back pain  < / =  2/10  to increase tolerance for exercise  2. Increase ROM by 10 degrees where limited in order to perform ADLs without difficulty.  3. Increase strength by 1/3 MMT grade in gluteal muscles  to increase tolerance for ADL and work activities.  4. Pt to tolerate HEP to improve ROM and independence with ADL's     Long Term Goals: 12 weeks  1.Report decreased low back pain < / = 0/10  to increase tolerance for ADLs  2.Patient goal: improve general mobility  3.Increase strength to 4+/5 in  Gluteal muscles  to increase tolerance for ADL and work activities.    PLAN   Progress POC as tolerated by patient.     Carolee Peter, PT

## 2022-03-31 ENCOUNTER — CLINICAL SUPPORT (OUTPATIENT)
Dept: REHABILITATION | Facility: HOSPITAL | Age: 72
End: 2022-03-31
Payer: MEDICAID

## 2022-03-31 DIAGNOSIS — M54.40 CHRONIC LOW BACK PAIN WITH SCIATICA, SCIATICA LATERALITY UNSPECIFIED, UNSPECIFIED BACK PAIN LATERALITY: Primary | ICD-10-CM

## 2022-03-31 DIAGNOSIS — G89.29 CHRONIC LOW BACK PAIN WITH SCIATICA, SCIATICA LATERALITY UNSPECIFIED, UNSPECIFIED BACK PAIN LATERALITY: Primary | ICD-10-CM

## 2022-03-31 PROCEDURE — 97110 THERAPEUTIC EXERCISES: CPT | Performed by: PHYSICAL THERAPIST

## 2022-03-31 PROCEDURE — 97140 MANUAL THERAPY 1/> REGIONS: CPT | Performed by: PHYSICAL THERAPIST

## 2022-03-31 NOTE — PROGRESS NOTES
"  Physical Therapy Daily Treatment Note     Name: Chantal [place]  Clinic Number: [id number]    Therapy Diagnosis:   Encounter Diagnosis   Name Primary?    Chronic low back pain with sciatica, sciatica laterality unspecified, unspecified back pain laterality Yes     Physician: Bina Leach II, MD    Visit Date: 3/31/2022    Physician Orders: PT Eval and Treat   Medical Diagnosis from Referral: M54.9 (ICD-10-CM) - Back pain  Evaluation Date: 1/10/2022  Authorization Period Expiration: 07/31/2022  Plan of Care Expiration: 4/31/2022  Visit # / Visits authorized: 19/24    Time In: 1353  Time Out: 1455  Total Billable Time: 45 minutes    Precautions: Standard    Subjective     Pt reports: Having more pain free days, and pain intensity is decreasing. Overall she is improving and has been able to exercise more at home.     She was compliant with home exercise program.  Response to previous treatment: No adverse effects  Functional change: N/A    Pain: patient did not verbalize number  Location: lumbar region     Objective     Began tx with MHP to thoracolumbar region x 10'.    Chantal received therapeutic exercises to develop strength, endurance, ROM and flexibility for 30 minutes including:    PPT 10" hold x 5' 20x  PPT to bent knee fall out 20x  PPT to march 20x  Bridges OTB 3 x 10 x 3"   FMP thoracolumbar flexion 5x      Chantal received the following manual therapy techniques: Joint mobilizations were applied for 15 minutes including:    Gross lumbar distraction  Lumbar flexion mobilizations IV  Lower lumbar rotation MET R    Gluteal and hip musculature was palpated for trigger points. Trigger points were treated with dry needling. Needles were wound 2x and attached to ESTIM for 10 minutes    Home Exercises Provided and Patient Education Provided     Education provided:   - Diagnosis and prognosis    Written Home Exercises Provided: yes.  Exercises were reviewed and Chantal was able to demonstrate them prior to the end " of the session.  Chantal demonstrated good  understanding of the education provided.     See EMR under Patient Instructions for exercises provided 1/10/22.    Assessment     Showing progress today which is encouraging. Able to progress with acquisition based exercises. Needs base core stability. No adverse effects noted with DN.     Chantal Is progressing well towards her goals.   Pt prognosis is Guarded.     Pt will continue to benefit from skilled outpatient physical therapy to address the deficits listed in the problem list box on initial evaluation, provide pt/family education and to maximize pt's level of independence in the home and community environment.     Pt's spiritual, cultural and educational needs considered and pt agreeable to plan of care and goals.     Anticipated barriers to physical therapy: age, chronicity    GOALS: Short Term Goals:  6 weeks  1.Report decreased low back pain  < / =  2/10  to increase tolerance for exercise  2. Increase ROM by 10 degrees where limited in order to perform ADLs without difficulty.  3. Increase strength by 1/3 MMT grade in gluteal muscles  to increase tolerance for ADL and work activities.  4. Pt to tolerate HEP to improve ROM and independence with ADL's     Long Term Goals: 12 weeks  1.Report decreased low back pain < / = 0/10  to increase tolerance for ADLs  2.Patient goal: improve general mobility  3.Increase strength to 4+/5 in  Gluteal muscles  to increase tolerance for ADL and work activities.    Plan     Outpatient Physical Therapy 1 times weekly for 8 weeks to include the following interventions: manual therapy, therapeutic exercise, therapeutic activities, and neuromuscular re-education.    Juan F Mitchell, PT, DPT, OCS

## 2022-04-04 ENCOUNTER — CLINICAL SUPPORT (OUTPATIENT)
Dept: REHABILITATION | Facility: HOSPITAL | Age: 72
End: 2022-04-04
Payer: MEDICAID

## 2022-04-04 DIAGNOSIS — G89.29 CHRONIC LOW BACK PAIN, UNSPECIFIED BACK PAIN LATERALITY, UNSPECIFIED WHETHER SCIATICA PRESENT: Primary | ICD-10-CM

## 2022-04-04 DIAGNOSIS — M54.50 CHRONIC LOW BACK PAIN, UNSPECIFIED BACK PAIN LATERALITY, UNSPECIFIED WHETHER SCIATICA PRESENT: Primary | ICD-10-CM

## 2022-04-04 PROCEDURE — 97110 THERAPEUTIC EXERCISES: CPT

## 2022-04-04 NOTE — PROGRESS NOTES
[facility]   Physical Therapy Treatment Note     Name: Chantal Israel Khai  Clinic Number: [id number]    Therapy Diagnosis:   Encounter Diagnosis   Name Primary?    Chronic low back pain, unspecified back pain laterality, unspecified whether sciatica present Yes     Physician: Bina Leach II, MD    Visit Date: 4/4/2022    Physician Orders: PT Eval and Treat   Medical Diagnosis from Referral: M54.9 (ICD-10-CM) - Back pain  Evaluation Date: 1/10/2022  Authorization Period Expiration: 07/31/2022  Plan of Care Expiration: 4/31/2022  Visit # / Visits authorized: 20/24  PTA Visit #: 0/5     Time In: 2:05 pm  Time Out: 2:58 pm     Total Billable Time: 53 minutes    SUBJECTIVE     Pt reports: gradual improvements since last aquatic PT session.     She was compliant with home exercise program.    Response to previous treatment: min soreness    Functional change: ongoing    Pain: 6/10  Location: from her Waist all the way down B LE (R greater than L)    OBJECTIVE     Objective Measures updated at progress report unless specified.     Treatment     Chantal received aquatic therapeutic exercises to develop strength, endurance, ROM, flexibility, posture, and core stabilization for 53 minutes including:    FUNCTIONAL MOBILITY TRAINING x 2 laps each at beginning and 1 lap each at end of session  Walk forward/backward/lateral    STRETCHES 2 x 30sec  HS stretch Bi    LE EX x 20  Heel raise into Squat  Hip abduction  Hip flex/ext  LAQ--attempted but painful on the right  HS curl  Standing tripod clam  Walking Marches with yellow DB x 2 laps  Sit to stand from white pool stool    UE EX/CORE  x 20  Shoulder flex/ext TA activation paddles open  Shoulder horizontal abd/add TA activation paddles open  Mini squat with push/pull red kickboard    ENDURANCE and // bar exercises  Bicycle in // bars 5'  LTR x 2'  DKTC x 20 reps  Scissor kicks x 2'    Patient Education and Home Exercises     Home Exercises Provided  and Patient Education Provided     Education provided:   Role of aquatic therapy  Hydration post therapy    Written Home Exercises Provided: Patient instructed to cont prior HEP. Exercises were reviewed and Chantal was able to demonstrate them prior to the end of the session.  Chantal demonstrated good  understanding of the education provided. See EMR under Patient Instructions for exercises provided during therapy sessions    ASSESSMENT     Patient required VC on combined motion of heel raises into a squat while maintaining a wide STANLEY to improve body mechanics and form. Pt was able to progress march outside of the // bars with use of yellow DB. Pt was also able to tolerate STS from pool stool without increase in symptoms.     Chantal Is progressing well towards her goals.     Pt prognosis is Fair.     Pt will continue to benefit from skilled outpatient physical therapy to address the deficits listed in the problem list box on initial evaluation, provide pt/family education and to maximize pt's level of independence in the home and community environment.     Pt's spiritual, cultural and educational needs considered and pt agreeable to plan of care and goals.     Anticipated barriers to physical therapy: chronicity of pain    GOALS: Short Term Goals:  6 weeks  1.Report decreased low back pain  < / =  2/10  to increase tolerance for exercise  2. Increase ROM by 10 degrees where limited in order to perform ADLs without difficulty.  3. Increase strength by 1/3 MMT grade in gluteal muscles  to increase tolerance for ADL and work activities.  4. Pt to tolerate HEP to improve ROM and independence with ADL's     Long Term Goals: 12 weeks  1.Report decreased low back pain < / = 0/10  to increase tolerance for ADLs  2.Patient goal: improve general mobility  3.Increase strength to 4+/5 in  Gluteal muscles  to increase tolerance for ADL and work activities.    PLAN   Progress POC as tolerated by patient.     Cristóbal Macedo, PT

## 2022-04-06 ENCOUNTER — TELEPHONE (OUTPATIENT)
Dept: PAIN MEDICINE | Facility: CLINIC | Age: 72
End: 2022-04-06
Payer: MEDICAID

## 2022-04-06 NOTE — TELEPHONE ENCOUNTER
Spoke to Claudette at [facility] and explained that we do not accept Medicaid. Claudette stated she will call the patient and inform them. Nothing further discussed.

## 2022-04-06 NOTE — TELEPHONE ENCOUNTER
----- Message from Marilee Abbasi sent at 4/6/2022 11:43 AM CDT -----  Contact: JONH KAR BUCKLEY [[id number]] [phone]  Type: Appointment Request    Name of Caller: JONHCARMELITAJOLLY BUCKLEY [[id number]]  When is the first available appointment? Do not have access  Reason for Visit:  Pain in lower back/buttox/hips/thighs, suspected pinch nerve  Best Call Back Number: [phone]  Additional Information: Pt will be a new Medicaid patient. Requests an in-person visit. Referral and clinical notes will be faxed to [phone] from [facility].

## 2022-04-06 NOTE — TELEPHONE ENCOUNTER
----- Message from Carmella Humphreys sent at 4/6/2022 12:43 PM CDT -----  Contact: Patient  Type:  Patient Returning Call    Who Called:Patient   Does the patient know what this is regarding?:Waiting on a call    Would the patient rather a call back or a response via Miloner? Call back   Best Call Back Number:[phone]  Additional Information: Please assist         
Returned patient's call and explained that we do not accept Medicaid insurance. Patient understood and nothing further discussed.  
Warm

## 2022-04-07 ENCOUNTER — CLINICAL SUPPORT (OUTPATIENT)
Dept: REHABILITATION | Facility: HOSPITAL | Age: 72
End: 2022-04-07
Payer: MEDICAID

## 2022-04-07 DIAGNOSIS — M54.40 ACUTE RIGHT-SIDED LOW BACK PAIN WITH SCIATICA, SCIATICA LATERALITY UNSPECIFIED: Primary | ICD-10-CM

## 2022-04-07 PROCEDURE — 97140 MANUAL THERAPY 1/> REGIONS: CPT | Performed by: PHYSICAL THERAPIST

## 2022-04-07 PROCEDURE — 97112 NEUROMUSCULAR REEDUCATION: CPT | Performed by: PHYSICAL THERAPIST

## 2022-04-07 NOTE — PROGRESS NOTES
"  Physical Therapy Daily Treatment Note     Name: Chantal Israel Crawford  Clinic Number: [id number]    Therapy Diagnosis:   No diagnosis found.  Physician: Bina Leach II, MD    Visit Date: 4/7/2022    Physician Orders: PT Eval and Treat   Medical Diagnosis from Referral: M54.9 (ICD-10-CM) - Back pain  Evaluation Date: 1/10/2022  Authorization Period Expiration: 07/31/2022  Plan of Care Expiration: 4/31/2022  Visit # / Visits authorized: 21/24    Time In: 1355  Time Out: 1450  Total Billable Time: 45 minutes    Precautions: Standard    Subjective     Pt reports: Continues to report decreased intensity of pain and increased ability to complete daily tasks such as cooking.     She was compliant with home exercise program.  Response to previous treatment: No adverse effects  Functional change: N/A    Pain: patient did not verbalize number  Location: lumbar region     Objective     Began tx with MHP to thoracolumbar region x 10'.    Chantal received therapeutic exercises to develop strength, endurance, ROM and flexibility for 30 minutes including:    PPT 10" hold x 5' 20x  PPT to bent knee fall out 20x  PPT to march 20x  Bridges OTB 3 x 10 x 3"   FMP thoracolumbar flexion 5x      Chantal received the following manual therapy techniques: Joint mobilizations were applied for 15 minutes including:    Gross lumbar distraction  Lumbar flexion mobilizations IV  Lower lumbar rotation MET R    Lumbar spine musculature was palpated for trigger points. Trigger points were treated with dry needling. Needles were wound 2x and attached to ESTIM for 10 minutes    Home Exercises Provided and Patient Education Provided     Education provided:   - Diagnosis and prognosis    Written Home Exercises Provided: yes.  Exercises were reviewed and Chantal was able to demonstrate them prior to the end of the session.  Chantal demonstrated good  understanding of the education provided.     See EMR under Patient Instructions for exercises provided " 1/10/22.    Assessment     Continues with slow progress with acquisition based exercises. Responds with short term pain reduction to DN. Will conitnue with current POC.     Chantal Is progressing well towards her goals.   Pt prognosis is Guarded.     Pt will continue to benefit from skilled outpatient physical therapy to address the deficits listed in the problem list box on initial evaluation, provide pt/family education and to maximize pt's level of independence in the home and community environment.     Pt's spiritual, cultural and educational needs considered and pt agreeable to plan of care and goals.     Anticipated barriers to physical therapy: age, chronicity    GOALS: Short Term Goals:  6 weeks  1.Report decreased low back pain  < / =  2/10  to increase tolerance for exercise  2. Increase ROM by 10 degrees where limited in order to perform ADLs without difficulty.  3. Increase strength by 1/3 MMT grade in gluteal muscles  to increase tolerance for ADL and work activities.  4. Pt to tolerate HEP to improve ROM and independence with ADL's     Long Term Goals: 12 weeks  1.Report decreased low back pain < / = 0/10  to increase tolerance for ADLs  2.Patient goal: improve general mobility  3.Increase strength to 4+/5 in  Gluteal muscles  to increase tolerance for ADL and work activities.    Plan     Outpatient Physical Therapy 1 times weekly for 8 weeks to include the following interventions: manual therapy, therapeutic exercise, therapeutic activities, and neuromuscular re-education.    Juan F Mitchell, PT, DPT, OCS

## 2022-04-14 ENCOUNTER — CLINICAL SUPPORT (OUTPATIENT)
Dept: REHABILITATION | Facility: HOSPITAL | Age: 72
End: 2022-04-14
Payer: MEDICAID

## 2022-04-14 DIAGNOSIS — M54.40 ACUTE RIGHT-SIDED LOW BACK PAIN WITH SCIATICA, SCIATICA LATERALITY UNSPECIFIED: Primary | ICD-10-CM

## 2022-04-14 PROCEDURE — 97110 THERAPEUTIC EXERCISES: CPT | Performed by: PHYSICAL THERAPIST

## 2022-04-14 NOTE — PROGRESS NOTES
"  Physical Therapy Daily Treatment Note     Name: Chantal Israel [place]  Clinic Number: [id number]    Therapy Diagnosis:   Encounter Diagnosis   Name Primary?    Acute right-sided low back pain with sciatica, sciatica laterality unspecified Yes     Physician: Bina Leach II, MD    Visit Date: 4/14/2022    Physician Orders: PT Eval and Treat   Medical Diagnosis from Referral: M54.9 (ICD-10-CM) - Back pain  Evaluation Date: 1/10/2022  Authorization Period Expiration: 07/31/2022  Plan of Care Expiration: 4/31/2022  Visit # / Visits authorized: 22/24    Time In: 1400  Time Out: 1450  Total Billable Time: 55 minutes    Precautions: Standard    Subjective     Pt reports: Continues to report decreased intensity of pain and increased ability to complete daily tasks such as cooking.     She was compliant with home exercise program.  Response to previous treatment: No adverse effects  Functional change: N/A    Pain: patient did not verbalize number  Location: lumbar region     Objective     Began tx with MHP to thoracolumbar region x 10'.    Chantal received therapeutic exercises to develop strength, endurance, ROM and flexibility for 40 minutes including:    PPT 10" hold x 10'20x  PPT to march 20x  Abdominal isometrics on ball  Bridges OTB 3 x 10 x 3"   FMP thoracolumbar flexion 5x  Multifidus activation on swiss ball 20x  DKTC on swiss ball      Chantal received the following manual therapy techniques: Joint mobilizations were applied for 15 minutes including:    Gross lumbar distraction  Lumbar flexion mobilizations IV  Lower lumbar rotation MET R    Lumbar spine musculature was palpated for trigger points. Trigger points were treated with dry needling. Needles were wound 2x and attached to ESTIM for 10 minutes    Home Exercises Provided and Patient Education Provided     Education provided:   - Diagnosis and prognosis    Written Home Exercises Provided: yes.  Exercises were reviewed and Chantal was able to demonstrate them prior " to the end of the session.  Chantal demonstrated good  understanding of the education provided.     See EMR under Patient Instructions for exercises provided 1/10/22.    Assessment     Shows improved exercise capacity which and reported adherence to her HEP. Continue with plan of DN for short term symptom reduction and strengthening.    Chantal Is progressing well towards her goals.   Pt prognosis is Guarded.     Pt will continue to benefit from skilled outpatient physical therapy to address the deficits listed in the problem list box on initial evaluation, provide pt/family education and to maximize pt's level of independence in the home and community environment.     Pt's spiritual, cultural and educational needs considered and pt agreeable to plan of care and goals.     Anticipated barriers to physical therapy: age, chronicity    GOALS: Short Term Goals:  6 weeks  1.Report decreased low back pain  < / =  2/10  to increase tolerance for exercise  2. Increase ROM by 10 degrees where limited in order to perform ADLs without difficulty.  3. Increase strength by 1/3 MMT grade in gluteal muscles  to increase tolerance for ADL and work activities.  4. Pt to tolerate HEP to improve ROM and independence with ADL's     Long Term Goals: 12 weeks  1.Report decreased low back pain < / = 0/10  to increase tolerance for ADLs  2.Patient goal: improve general mobility  3.Increase strength to 4+/5 in  Gluteal muscles  to increase tolerance for ADL and work activities.    Plan     Outpatient Physical Therapy 1 times weekly for 8 weeks to include the following interventions: manual therapy, therapeutic exercise, therapeutic activities, and neuromuscular re-education.    Juan F Mitchell, PT, DPT, OCS

## 2022-04-20 ENCOUNTER — CLINICAL SUPPORT (OUTPATIENT)
Dept: REHABILITATION | Facility: HOSPITAL | Age: 72
End: 2022-04-20
Payer: MEDICAID

## 2022-04-20 DIAGNOSIS — M54.50 CHRONIC LOW BACK PAIN, UNSPECIFIED BACK PAIN LATERALITY, UNSPECIFIED WHETHER SCIATICA PRESENT: Primary | ICD-10-CM

## 2022-04-20 DIAGNOSIS — G89.29 CHRONIC LOW BACK PAIN, UNSPECIFIED BACK PAIN LATERALITY, UNSPECIFIED WHETHER SCIATICA PRESENT: Primary | ICD-10-CM

## 2022-04-20 PROCEDURE — 97113 AQUATIC THERAPY/EXERCISES: CPT

## 2022-04-20 NOTE — PROGRESS NOTES
[facility] OUTPAT[facility] THERAPY AND [facility]LL[facility]   Physical Therapy Treatment Note     Name: Chantal Israel [place]  Clinic Number: [id number]    Therapy Diagnosis:   Encounter Diagnosis   Name Primary?    Chronic low back pain, unspecified back pain laterality, unspecified whether sciatica present Yes     Physician: Bina Leach II, MD    Visit Date: 4/20/2022    Physician Orders: PT Eval and Treat   Medical Diagnosis from Referral: M54.9 (ICD-10-CM) - Back pain  Evaluation Date: 1/10/2022  Authorization Period Expiration: 07/31/2022  Plan of Care Expiration: 4/31/2022  Visit # / Visits authorized: 23/24  PTA Visit #: 0/5     Time In: 2:05 pm  Time Out: 3:05 pm     Total Billable Time: 60 minutes    SUBJECTIVE     Pt reports: insidious increase in R knee pain today causing difficulty and pain with ambulating and standing.     She was compliant with home exercise program.    Response to previous treatment: min soreness    Functional change: ongoing    Pain: 6/10  Location: from her Waist all the way down B LE (R greater than L)    OBJECTIVE     Objective Measures updated at progress report unless specified.     Treatment     Chantal received aquatic therapeutic exercises to develop strength, endurance, ROM, flexibility, posture, and core stabilization for 60 minutes including:    FUNCTIONAL MOBILITY TRAINING x 2 laps each at beginning and 1 lap each at end of session  Walk forward/backward/lateral    STRETCHES 2 x 30sec  HS stretch Bi    LE EX x 20  Heel raise into Squat  Hip abduction  Hip flex/ext  LAQ  HS curl  Standing tripod clam  Walking Marches with yellow DB x 2 laps  Sit to stand from white pool stool    UE EX/CORE  x 20  Shoulder flex/ext TA activation paddles closed  Shoulder horizontal abd/add TA activation paddles closed  Shoulder abd/add TA activation paddle closed   Mini squat with push/pull red kickboard    ENDURANCE and // bar exercises  Bicycle in // bars 5'  LTR x 2'  DKTC x 20 reps  Flips X 20  Scissor kicks x  2'    Patient Education and Home Exercises     Home Exercises Provided and Patient Education Provided     Education provided:   Role of aquatic therapy  Hydration post therapy    Written Home Exercises Provided: Patient instructed to cont prior HEP. Exercises were reviewed and Chantal was able to demonstrate them prior to the end of the session.  Chantal demonstrated good  understanding of the education provided. See EMR under Patient Instructions for exercises provided during therapy sessions    ASSESSMENT     Patient was able to perform LAQ with the R LE for the first time as well as using paddles for UE strengthening for the first time without report of increase in pain or strain. Pt complete entire aquatic exercise program without report of increase in symptoms.     Chantal Is progressing well towards her goals.     Pt prognosis is Fair.     Pt will continue to benefit from skilled outpatient physical therapy to address the deficits listed in the problem list box on initial evaluation, provide pt/family education and to maximize pt's level of independence in the home and community environment.     Pt's spiritual, cultural and educational needs considered and pt agreeable to plan of care and goals.     Anticipated barriers to physical therapy: chronicity of pain    GOALS: Short Term Goals:  6 weeks  1.Report decreased low back pain  < / =  2/10  to increase tolerance for exercise  2. Increase ROM by 10 degrees where limited in order to perform ADLs without difficulty.  3. Increase strength by 1/3 MMT grade in gluteal muscles  to increase tolerance for ADL and work activities.  4. Pt to tolerate HEP to improve ROM and independence with ADL's     Long Term Goals: 12 weeks  1.Report decreased low back pain < / = 0/10  to increase tolerance for ADLs  2.Patient goal: improve general mobility  3.Increase strength to 4+/5 in  Gluteal muscles  to increase tolerance for ADL and work activities.    PLAN   Progress POC as  tolerated by patient.     Cristóbal Macedo, PT

## 2022-04-21 ENCOUNTER — CLINICAL SUPPORT (OUTPATIENT)
Dept: REHABILITATION | Facility: HOSPITAL | Age: 72
End: 2022-04-21
Payer: MEDICAID

## 2022-04-21 DIAGNOSIS — M54.40 ACUTE RIGHT-SIDED LOW BACK PAIN WITH SCIATICA, SCIATICA LATERALITY UNSPECIFIED: Primary | ICD-10-CM

## 2022-04-21 PROCEDURE — 97110 THERAPEUTIC EXERCISES: CPT | Performed by: PHYSICAL THERAPIST

## 2022-04-21 NOTE — PROGRESS NOTES
"  Physical Therapy Daily Treatment Note     Name: Chantal Israel [place]  Clinic Number: [id number]    Therapy Diagnosis:   Encounter Diagnosis   Name Primary?    Acute right-sided low back pain with sciatica, sciatica laterality unspecified Yes     Physician: Bina Leach II, MD    Visit Date: 4/21/2022    Physician Orders: PT Eval and Treat   Medical Diagnosis from Referral: M54.9 (ICD-10-CM) - Back pain  Evaluation Date: 1/10/2022  Authorization Period Expiration: 07/31/2022  Plan of Care Expiration: 4/31/2022  Visit # / Visits authorized: 24/24    Time In: 1400  Time Out: 1450  Total Billable Time: 55 minutes    Precautions: Standard    Subjective     Pt reports: Decreasing pain with DN and exercise.      She was compliant with home exercise program.  Response to previous treatment: No adverse effects  Functional change: N/A    Pain: patient did not verbalize number  Location: lumbar region     Objective     Began tx with MHP to thoracolumbar region x 10'.    Chantal received therapeutic exercises to develop strength, endurance, ROM and flexibility for 40 minutes including:    PPT 10" hold x 10'20x  PPT to march 20x  Abdominal isometrics on ball  Bridges OTB 3 x 10 x 3"   FMP thoracolumbar flexion 5x  Multifidus activation on swiss ball 20x  DKTC on swiss ball      Chantal received the following manual therapy techniques: Joint mobilizations were applied for 15 minutes including:    Gross lumbar distraction  Lumbar flexion mobilizations IV  Lower lumbar rotation MET R    Knee musculature was palpated for trigger points. Trigger points were treated with dry needling. Needles were wound 2x and attached to ESTIM for 10 minutes    Home Exercises Provided and Patient Education Provided     Education provided:   - Diagnosis and prognosis    Written Home Exercises Provided: yes.  Exercises were reviewed and Chantal was able to demonstrate them prior to the end of the session.  Chantal demonstrated good  understanding of the " education provided.     See EMR under Patient Instructions for exercises provided 1/10/22.    Assessment     Chantal has shown short term reductions in pain and improved movement skills with a combination of DN and exercise. She has completed her prescribed visits and will be discharged with a HEP.    Chantal Is progressing well towards her goals.   Pt prognosis is Guarded.     Pt will continue to benefit from skilled outpatient physical therapy to address the deficits listed in the problem list box on initial evaluation, provide pt/family education and to maximize pt's level of independence in the home and community environment.     Pt's spiritual, cultural and educational needs considered and pt agreeable to plan of care and goals.     Anticipated barriers to physical therapy: age, chronicity    GOALS: Short Term Goals:  6 weeks  1.Report decreased low back pain  < / =  2/10  to increase tolerance for exercise  2. Increase ROM by 10 degrees where limited in order to perform ADLs without difficulty.  3. Increase strength by 1/3 MMT grade in gluteal muscles  to increase tolerance for ADL and work activities.  4. Pt to tolerate HEP to improve ROM and independence with ADL's     Long Term Goals: 12 weeks  1.Report decreased low back pain < / = 0/10  to increase tolerance for ADLs  2.Patient goal: improve general mobility  3.Increase strength to 4+/5 in  Gluteal muscles  to increase tolerance for ADL and work activities.    Plan     Discharge from PT with an HEP.     Juan F Mitchell, PT, DPT, OCS

## 2022-04-25 ENCOUNTER — CLINICAL SUPPORT (OUTPATIENT)
Dept: REHABILITATION | Facility: HOSPITAL | Age: 72
End: 2022-04-25
Payer: MEDICAID

## 2022-04-25 DIAGNOSIS — G89.29 CHRONIC MIDLINE LOW BACK PAIN, UNSPECIFIED WHETHER SCIATICA PRESENT: Primary | ICD-10-CM

## 2022-04-25 DIAGNOSIS — M54.50 CHRONIC MIDLINE LOW BACK PAIN, UNSPECIFIED WHETHER SCIATICA PRESENT: Primary | ICD-10-CM

## 2022-04-25 PROCEDURE — 97110 THERAPEUTIC EXERCISES: CPT

## 2022-04-25 NOTE — PROGRESS NOTES
"[facility]   Physical Therapy Treatment Note     Name: Chantal Ridley  Clinic Number: [id number]    Therapy Diagnosis:   Encounter Diagnosis   Name Primary?    Chronic midline low back pain, unspecified whether sciatica present Yes     Physician: Bina Leach II, MD    Visit Date: 4/25/2022    Physician Orders: PT Eval and Treat   Medical Diagnosis from Referral: M54.9 (ICD-10-CM) - Back pain  Evaluation Date: 1/10/2022  Authorization Period Expiration: 07/31/2022  Plan of Care Expiration: 4/31/2022  Visit # / Visits authorized: 25/24  PTA Visit #: 0/5     Time In: 2:05 pm  Time Out: 3:00 pm     Total Billable Time: 30 minutes    SUBJECTIVE     Pt reports: "feeling okay today. Some days are good, some days are bad."    She was compliant with home exercise program.    Response to previous treatment: min soreness    Functional change: ongoing    Pain: 6/10  Location: from her Waist all the way down B LE (R greater than L)    OBJECTIVE     Objective Measures updated at progress report unless specified.     Treatment     Chantal received aquatic therapeutic exercises to develop strength, endurance, ROM, flexibility, posture, and core stabilization for 55 minutes including:    FUNCTIONAL MOBILITY TRAINING x 2 laps each at beginning and 1 lap each at end of session  Walk forward/backward/lateral    STRETCHES 2 x 30sec  HS stretch Bi    LE EX x 20  Heel raise into Squat  Hip abduction  Hip flex/ext  LAQ  HS curl  Standing tripod clam  Walking Marches with yellow DB x 2 laps  Sit to stand from white pool stool    UE EX/CORE  x 20  Shoulder flex/ext TA activation paddles closed  Shoulder horizontal abd/add TA activation paddles closed  Shoulder abd/add TA activation paddle closed   Mini squat with push/pull red kickboard    ENDURANCE and // bar exercises  Bicycle in // bars 5'  LTR x 2'  DKTC x 20 reps  Flips X 20  Scissor kicks x 2'    Patient Education and Home Exercises     Home Exercises " Provided and Patient Education Provided     Education provided:   Role of aquatic therapy  Hydration post therapy    Written Home Exercises Provided: Patient instructed to cont prior HEP. Exercises were reviewed and Chantal was able to demonstrate them prior to the end of the session.  Chantal demonstrated good  understanding of the education provided. See EMR under Patient Instructions for exercises provided during therapy sessions    ASSESSMENT     Patient required VC on increase STANLEY while performing squats in order to improve form necessary for strengthening without excessive strain on Bi knees or lumbar spine. Pt completed all exercises without report of increase in pain or strain.     Chantal Is progressing well towards her goals.     Pt prognosis is Fair.     Pt will continue to benefit from skilled outpatient physical therapy to address the deficits listed in the problem list box on initial evaluation, provide pt/family education and to maximize pt's level of independence in the home and community environment.     Pt's spiritual, cultural and educational needs considered and pt agreeable to plan of care and goals.     Anticipated barriers to physical therapy: chronicity of pain    GOALS: Short Term Goals:  6 weeks  1.Report decreased low back pain  < / =  2/10  to increase tolerance for exercise  2. Increase ROM by 10 degrees where limited in order to perform ADLs without difficulty.  3. Increase strength by 1/3 MMT grade in gluteal muscles  to increase tolerance for ADL and work activities.  4. Pt to tolerate HEP to improve ROM and independence with ADL's     Long Term Goals: 12 weeks  1.Report decreased low back pain < / = 0/10  to increase tolerance for ADLs  2.Patient goal: improve general mobility  3.Increase strength to 4+/5 in  Gluteal muscles  to increase tolerance for ADL and work activities.    PLAN   Progress POC as tolerated by patient.     Cristóbal Macedo, PT

## 2022-05-02 ENCOUNTER — OFFICE VISIT (OUTPATIENT)
Dept: FAMILY MEDICINE | Facility: HOSPITAL | Age: 72
End: 2022-05-02
Attending: FAMILY MEDICINE
Payer: MEDICARE

## 2022-05-02 VITALS
HEART RATE: 67 BPM | DIASTOLIC BLOOD PRESSURE: 80 MMHG | WEIGHT: 154.75 LBS | BODY MASS INDEX: 29.22 KG/M2 | SYSTOLIC BLOOD PRESSURE: 150 MMHG | HEIGHT: 61 IN

## 2022-05-02 DIAGNOSIS — M51.27 LUMBOSACRAL DISC HERNIATION: Primary | ICD-10-CM

## 2022-05-02 PROCEDURE — 99214 OFFICE O/P EST MOD 30 MIN: CPT | Performed by: STUDENT IN AN ORGANIZED HEALTH CARE EDUCATION/TRAINING PROGRAM

## 2022-05-02 RX ORDER — DICLOFENAC SODIUM AND MISOPROSTOL 50; 200 MG/1; UG/1
1 TABLET, DELAYED RELEASE ORAL 4 TIMES DAILY
COMMUNITY
End: 2024-02-19

## 2022-05-02 RX ORDER — IRBESARTAN 150 MG/1
150 TABLET ORAL NIGHTLY
COMMUNITY

## 2022-05-02 RX ORDER — NAPROXEN 500 MG/1
500 TABLET ORAL 2 TIMES DAILY WITH MEALS
COMMUNITY
End: 2024-02-19

## 2022-05-02 RX ORDER — DICLOFENAC SODIUM 30 MG/G
GEL TOPICAL 2 TIMES DAILY
COMMUNITY

## 2022-05-02 RX ORDER — TIZANIDINE 4 MG/1
4 TABLET ORAL EVERY 8 HOURS
COMMUNITY

## 2022-05-02 RX ORDER — OXYCODONE AND ACETAMINOPHEN 5; 325 MG/1; MG/1
1 TABLET ORAL EVERY 4 HOURS PRN
COMMUNITY
End: 2022-05-02 | Stop reason: SDUPTHER

## 2022-05-02 RX ORDER — OXYCODONE AND ACETAMINOPHEN 5; 325 MG/1; MG/1
1 TABLET ORAL EVERY 6 HOURS PRN
Qty: 30 EACH | Refills: 0 | Status: SHIPPED | OUTPATIENT
Start: 2022-05-02 | End: 2023-05-04

## 2022-05-02 RX ORDER — LIDOCAINE AND PRILOCAINE 25; 25 MG/G; MG/G
CREAM TOPICAL
COMMUNITY

## 2022-05-02 RX ORDER — LIDOCAINE 50 MG/G
1 PATCH TOPICAL
COMMUNITY
End: 2023-12-11 | Stop reason: SDUPTHER

## 2022-05-02 RX ORDER — OXYCODONE AND ACETAMINOPHEN 5; 325 MG/1; MG/1
1 TABLET ORAL EVERY 6 HOURS PRN
Qty: 30 EACH | Refills: 0 | Status: SHIPPED | OUTPATIENT
Start: 2022-05-02 | End: 2022-05-02
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
## 2023-05-04 ENCOUNTER — OFFICE VISIT (OUTPATIENT)
Dept: PODIATRY | Facility: CLINIC | Age: 73
End: 2023-05-04
Payer: MEDICARE

## 2023-05-04 VITALS
DIASTOLIC BLOOD PRESSURE: 83 MMHG | WEIGHT: 140.44 LBS | HEIGHT: 61 IN | BODY MASS INDEX: 26.51 KG/M2 | HEART RATE: 72 BPM | SYSTOLIC BLOOD PRESSURE: 144 MMHG

## 2023-05-04 DIAGNOSIS — G57.51 TARSAL TUNNEL SYNDROME OF RIGHT SIDE: ICD-10-CM

## 2023-05-04 DIAGNOSIS — E08.00 DIABETES MELLITUS DUE TO UNDERLYING CONDITION WITH HYPEROSMOLARITY WITHOUT COMA, WITHOUT LONG-TERM CURRENT USE OF INSULIN: Primary | ICD-10-CM

## 2023-05-04 DIAGNOSIS — G57.81 NERVE ENTRAPMENT OF LOWER LIMB, RIGHT: ICD-10-CM

## 2023-05-04 DIAGNOSIS — M54.17 LUMBOSACRAL RADICULOPATHY: ICD-10-CM

## 2023-05-04 PROCEDURE — 99203 PR OFFICE/OUTPT VISIT, NEW, LEVL III, 30-44 MIN: ICD-10-PCS | Mod: 25,S$PBB,, | Performed by: PODIATRIST

## 2023-05-04 PROCEDURE — 64450 PR NERVE BLOCK INJ, ANES/STEROID, OTHER PERIPHERAL: ICD-10-PCS | Mod: S$PBB,RT,, | Performed by: PODIATRIST

## 2023-05-04 PROCEDURE — 64450 NJX AA&/STRD OTHER PN/BRANCH: CPT | Mod: S$PBB,RT,, | Performed by: PODIATRIST

## 2023-05-04 PROCEDURE — 64450 NJX AA&/STRD OTHER PN/BRANCH: CPT | Mod: PBBFAC,RT | Performed by: PODIATRIST

## 2023-05-04 PROCEDURE — 99213 OFFICE O/P EST LOW 20 MIN: CPT | Mod: PBBFAC | Performed by: PODIATRIST

## 2023-05-04 PROCEDURE — 99999 PR PBB SHADOW E&M-EST. PATIENT-LVL III: ICD-10-PCS | Mod: PBBFAC,,, | Performed by: PODIATRIST

## 2023-05-04 PROCEDURE — 99203 OFFICE O/P NEW LOW 30 MIN: CPT | Mod: 25,S$PBB,, | Performed by: PODIATRIST

## 2023-05-04 PROCEDURE — 99999 PR PBB SHADOW E&M-EST. PATIENT-LVL III: CPT | Mod: PBBFAC,,, | Performed by: PODIATRIST

## 2023-05-04 RX ORDER — MELOXICAM 15 MG/1
TABLET ORAL
COMMUNITY
Start: 2023-04-13 | End: 2024-02-19

## 2023-05-04 RX ORDER — OXYCODONE AND ACETAMINOPHEN 7.5; 325 MG/1; MG/1
1 TABLET ORAL EVERY 12 HOURS PRN
Qty: 30 TABLET | Refills: 0 | Status: SHIPPED | OUTPATIENT
Start: 2023-05-04 | End: 2024-01-16

## 2023-05-04 RX ORDER — AMITRIPTYLINE HYDROCHLORIDE 10 MG/1
10 TABLET, FILM COATED ORAL NIGHTLY PRN
Qty: 30 TABLET | Refills: 2 | Status: SHIPPED | OUTPATIENT
Start: 2023-05-04 | End: 2024-05-03

## 2023-05-15 ENCOUNTER — TELEPHONE (OUTPATIENT)
Dept: PODIATRY | Facility: CLINIC | Age: 73
End: 2023-05-15
Payer: MEDICARE

## 2023-05-15 NOTE — TELEPHONE ENCOUNTER
I spoke with patient about her concerns of seeing pain management doctor that Dr. Braun was referring her to. She states she having severe pain in her back. Want to know the neurologist Dr. Braun was referring her to.

## 2023-05-15 NOTE — PROGRESS NOTES
Subjective:      Patient ID: Chantal Ridley is a 73 y.o. female.    Chief Complaint:   Diabetic Foot Exam (Was seeing Dr. Tyrone Choudhary) and Foot Pain (Right foot)    Chantal is a 73 y.o. female who presents to the clinic upon referral from Dr. Ayala  for evaluation and treatment of diabetic feet. Chantal has a past medical history of Diabetes mellitus, Fibromyalgia, degenerative disc disease, and Hypertension. Patient relates no major problem with feet. Only complaints today consist of nerve pain to the bottom of the right foot..    PCP: [facility]    Date Last Seen by PCP:   Chief Complaint   Patient presents with    Diabetic Foot Exam     Was seeing Dr. Tyrone Choudhary    Foot Pain     Right foot         Current shoe gear: Casual shoes    Hemoglobin A1C   Date Value Ref Range Status   02/14/2012 9.8 (H) 4.0 - 6.2 % Final         Review of Systems   Constitutional: Negative for chills, decreased appetite, fever and malaise/fatigue.   HENT:  Negative for congestion, hearing loss, nosebleeds and tinnitus.    Eyes:  Negative for double vision, pain, photophobia and visual disturbance.   Cardiovascular:  Negative for chest pain, claudication, cyanosis and leg swelling.   Respiratory:  Negative for cough, hemoptysis, shortness of breath and wheezing.    Endocrine: Negative for cold intolerance and heat intolerance.   Hematologic/Lymphatic: Negative for adenopathy and bleeding problem.   Skin:  Negative for color change, dry skin, itching, nail changes and suspicious lesions.   Musculoskeletal:  Positive for joint pain and stiffness. Negative for arthritis and myalgias.   Gastrointestinal:  Negative for abdominal pain, jaundice, nausea and vomiting.   Genitourinary:  Negative for dysuria, frequency and hematuria.   Neurological:  Positive for numbness, paresthesias and sensory change. Negative for difficulty with concentration and loss of balance.   Psychiatric/Behavioral:  Negative for altered mental  status, hallucinations and suicidal ideas. The patient is not nervous/anxious.    Allergic/Immunologic: Negative for environmental allergies and persistent infections.         Objective:      Physical Exam  Vitals reviewed.   Constitutional:       Appearance: She is well-developed.   HENT:      Head: Normocephalic and atraumatic.   Cardiovascular:      Pulses:           Dorsalis pedis pulses are 2+ on the right side and 2+ on the left side.        Posterior tibial pulses are 2+ on the right side and 2+ on the left side.   Pulmonary:      Effort: Pulmonary effort is normal.   Musculoskeletal:         General: Normal range of motion.      Comments: Inspection and palpation of the muscles joints and bones of both lower extremities reveal that muscle strength for the anterior lateral and posterior muscle groups and intrinsic muscle groups of the foot are all 5 over 5 symmetrical.  Ankle subtalar midtarsal and digital joint range of motion are within normal limits, nonpainful, without crepitus or effusion.  Patient exhibits a normal angle and base of gait.  Palpation of the tendons reveal no defects.   Skin:     General: Skin is warm and dry.      Capillary Refill: Capillary refill takes 2 to 3 seconds.      Comments: Skin turgor is normal bilaterally.  Skin texture is well hydrated to both lower extremities.  No lesions or rashes or wounds appreciated bilaterally.  Nail plates 1 through 5 bilaterally are within normal limits for length and thickness.  No nail clubbing or incurvation noted.   Neurological:      Mental Status: She is alert and oriented to person, place, and time.      Comments: Sharp dull light touch vibratory proprioceptive sensation are intact bilaterally.  Deep tendon reflexes to patellar and Achilles tendon are symmetrical 2 over 4 bilaterally.  No ankle clonus or Babinski reflexes noted bilaterally.  Coordination is normal to both feet and lower extremities.  Positive Tinel sign/provocation sign  right tarsal tunnel.   Psychiatric:         Behavior: Behavior normal.             Assessment:       Encounter Diagnoses   Name Primary?    Diabetes mellitus due to underlying condition with hyperosmolarity without coma, without long-term current use of insulin Yes    Lumbosacral radiculopathy     Nerve entrapment of lower limb, right     Tarsal tunnel syndrome of right side      Independent visualization of imaging was performed.  Results were reviewed in detail with patient.       Plan:       Chantal was seen today for diabetic foot exam and foot pain.    Diagnoses and all orders for this visit:    Diabetes mellitus due to underlying condition with hyperosmolarity without coma, without long-term current use of insulin    Lumbosacral radiculopathy    Nerve entrapment of lower limb, right    Tarsal tunnel syndrome of right side    Other orders  -     oxyCODONE-acetaminophen (PERCOCET) 7.5-325 mg per tablet; Take 1 tablet by mouth every 12 (twelve) hours as needed for Pain.  -     amitriptyline (ELAVIL) 10 MG tablet; Take 1 tablet (10 mg total) by mouth nightly as needed for Insomnia.      I counseled the patient on her conditions, their implications and medical management.    The nature of the condition, options for management, as well as potential risks and complications were discussed in detail with patient. Patient was amenable to my recommendations and left my office fully informed and will follow up as instructed or sooner if necessary.      Discussed options for peripheral neuropathy/nerve entrapment syndrome including nerve block therapy, surgical nerve entrapment decompression procedures, and various vitamins and supplementation available shown to improve nerve function.    Nerve injection:    The area overlying the right tarsal tunnel nerve(s) was sterilely prepped, verbal consent was obtained, 2 cc's of 0.5% Marcaine plain was infiltrated around the affected nerve(s) for a diagnostic nerve block.  This was  well tolerated with no complications.    Shoe inspection. Diabetic Foot Education. Patient reminded of the importance of good nutrition and blood sugar control to help prevent podiatric complications of diabetes. Patient instructed on proper foot hygeine. We discussed wearing proper shoe gear, daily foot inspections and Diabetic foot education in detail.    Return to clinic in 3-6 months or sooner if problems arise

## 2023-05-30 ENCOUNTER — HOSPITAL ENCOUNTER (OUTPATIENT)
Dept: RADIOLOGY | Facility: HOSPITAL | Age: 73
Discharge: HOME | End: 2023-05-30
Attending: ORTHOPAEDIC SURGERY
Payer: MEDICARE

## 2023-05-30 ENCOUNTER — OFFICE VISIT (OUTPATIENT)
Dept: ORTHOPEDICS | Facility: CLINIC | Age: 73
End: 2023-05-30
Payer: MEDICARE

## 2023-05-30 VITALS — HEIGHT: 61 IN | BODY MASS INDEX: 26.51 KG/M2 | WEIGHT: 140.44 LBS

## 2023-05-30 DIAGNOSIS — M47.812 CERVICAL SPONDYLOSIS: Primary | ICD-10-CM

## 2023-05-30 DIAGNOSIS — M54.16 LUMBAR RADICULOPATHY: ICD-10-CM

## 2023-05-30 DIAGNOSIS — M51.36 DDD (DEGENERATIVE DISC DISEASE), LUMBAR: ICD-10-CM

## 2023-05-30 DIAGNOSIS — M54.9 DORSALGIA, UNSPECIFIED: ICD-10-CM

## 2023-05-30 DIAGNOSIS — M47.816 LUMBAR SPONDYLOSIS: ICD-10-CM

## 2023-05-30 DIAGNOSIS — R26.89 IMBALANCE: ICD-10-CM

## 2023-05-30 DIAGNOSIS — M54.12 CERVICAL RADICULOPATHY: ICD-10-CM

## 2023-05-30 DIAGNOSIS — M43.10 DEGENERATIVE SPONDYLOLISTHESIS: ICD-10-CM

## 2023-05-30 DIAGNOSIS — M50.30 DDD (DEGENERATIVE DISC DISEASE), CERVICAL: ICD-10-CM

## 2023-05-30 DIAGNOSIS — M48.02 SPINAL STENOSIS, CERVICAL REGION: ICD-10-CM

## 2023-05-30 PROCEDURE — 72110 X-RAY EXAM L-2 SPINE 4/>VWS: CPT | Mod: 26,,, | Performed by: RADIOLOGY

## 2023-05-30 PROCEDURE — 72082 X-RAY EXAM ENTIRE SPI 2/3 VW: CPT | Mod: 26,,, | Performed by: RADIOLOGY

## 2023-05-30 PROCEDURE — 72110 XR LUMBAR SPINE AP AND LAT WITH FLEX/EXT: ICD-10-PCS | Mod: 26,,, | Performed by: RADIOLOGY

## 2023-05-30 PROCEDURE — 72050 XR CERVICAL SPINE AP LAT WITH FLEX EXTEN: ICD-10-PCS | Mod: 26,,, | Performed by: RADIOLOGY

## 2023-05-30 PROCEDURE — 99204 PR OFFICE/OUTPT VISIT, NEW, LEVL IV, 45-59 MIN: ICD-10-PCS | Mod: S$PBB,,, | Performed by: ORTHOPAEDIC SURGERY

## 2023-05-30 PROCEDURE — 99214 OFFICE O/P EST MOD 30 MIN: CPT | Mod: PBBFAC | Performed by: ORTHOPAEDIC SURGERY

## 2023-05-30 PROCEDURE — 72050 X-RAY EXAM NECK SPINE 4/5VWS: CPT | Mod: TC

## 2023-05-30 PROCEDURE — 99999 PR PBB SHADOW E&M-EST. PATIENT-LVL IV: ICD-10-PCS | Mod: PBBFAC,,, | Performed by: ORTHOPAEDIC SURGERY

## 2023-05-30 PROCEDURE — 72082 XR SCOLIOSIS COMPLETE: ICD-10-PCS | Mod: 26,,, | Performed by: RADIOLOGY

## 2023-05-30 PROCEDURE — 99204 OFFICE O/P NEW MOD 45 MIN: CPT | Mod: S$PBB,,, | Performed by: ORTHOPAEDIC SURGERY

## 2023-05-30 PROCEDURE — 72050 X-RAY EXAM NECK SPINE 4/5VWS: CPT | Mod: 26,,, | Performed by: RADIOLOGY

## 2023-05-30 PROCEDURE — 99999 PR PBB SHADOW E&M-EST. PATIENT-LVL IV: CPT | Mod: PBBFAC,,, | Performed by: ORTHOPAEDIC SURGERY

## 2023-05-30 PROCEDURE — 72110 X-RAY EXAM L-2 SPINE 4/>VWS: CPT | Mod: TC,59

## 2023-05-30 PROCEDURE — 72082 X-RAY EXAM ENTIRE SPI 2/3 VW: CPT | Mod: TC

## 2023-05-30 NOTE — PROGRESS NOTES
DATE: 5/30/2023  PATIENT: Chantal Ridley    Attending Physician: Jeronimo Rodrigez M.D.    CHIEF COMPLAINT: neck and BUE pain; LBP and RLE pain    HISTORY:  Chantal Ridley is a 73 y.o. female presents for initial evaluation of neck and b/l arm pain (Neck - 10, Arm - 10). The pain has been present for 6 years. The patient describes the pain as dull and it radiates down BUE to the hands. The pain is worse with activity and improved by rest. There is BUE associated numbness and tingling. There is BUE subjective weakness. Prior treatments have included meds (mobic, zanaflex, percocet), PT (did not help), lumbar HERACLIO in [facility], but no surgery.     She also has LBP that radiates posterolaterally down RLE to the toes. There is n/t in RLE and weakness in BLE. Pain was so bad today she had to use her cane. She was brought in by wheelchair.    The Patient denies myelopathic symptoms such as handwriting changes or difficulty with buttons/coins/keys. She has problems with balance/walking. Denies perineal paresthesias, bowel/bladder dysfunction.    The patient does not smoke or endorse IVDU. She has DM (HA1C of 6.3). The patient is not on any blood thinners. She is retired; she used to be a  for mortgages.    PAST MEDICAL/SURGICAL HISTORY:  Past Medical History:   Diagnosis Date    Diabetes mellitus     Fibromyalgia     Hx of degenerative disc disease     neck & back    Hypertension      Past Surgical History:   Procedure Laterality Date    CATARACT EXTRACTION W/  INTRAOCULAR LENS IMPLANT  05/17/2016    TUBAL LIGATION         Current Medications:   Current Outpatient Medications:     amitriptyline (ELAVIL) 10 MG tablet, Take 1 tablet (10 mg total) by mouth nightly as needed for Insomnia., Disp: 30 tablet, Rfl: 2    diclofenac sodium (SOLARAZE) 3 % gel, Apply topically 2 (two) times daily., Disp: , Rfl:     diclofenac-misoprostol  mg-mcg (ARTHROTEC 50)  mg-mcg per tablet, Take 1 tablet by mouth 4 (four)  times daily., Disp: , Rfl:     estrogen, conjugated,-medroxyprogesterone 0.625-2.5mg (PREMPRO) 0.625-2.5 mg per tablet, Take 1 tablet by mouth once daily., Disp: , Rfl:     irbesartan (AVAPRO) 150 MG tablet, Take 150 mg by mouth every evening., Disp: , Rfl:     LIDOcaine (LIDODERM) 5 %, Place 1 patch onto the skin every 24 hours. Remove & Discard patch within 12 hours or as directed by MD, Disp: , Rfl:     LIDOcaine-prilocaine (EMLA) cream, Apply topically as needed., Disp: , Rfl:     meloxicam (MOBIC) 15 MG tablet, , Disp: , Rfl:     naproxen (NAPROSYN) 500 MG tablet, Take 500 mg by mouth 2 (two) times daily with meals., Disp: , Rfl:     oxyCODONE-acetaminophen (PERCOCET) 7.5-325 mg per tablet, Take 1 tablet by mouth every 12 (twelve) hours as needed for Pain., Disp: 30 tablet, Rfl: 0    sitagliptan-metformin (JANUMET)  mg per tablet, Take 1 tablet by mouth 2 (two) times daily with meals., Disp: , Rfl:     tiZANidine (ZANAFLEX) 4 MG tablet, Take 4 mg by mouth every 8 (eight) hours., Disp: , Rfl:     Social History:   Social History     Socioeconomic History    Marital status: Single   Tobacco Use    Smoking status: Former     Types: Cigarettes     Quit date:      Years since quittin.4    Smokeless tobacco: Never   Substance and Sexual Activity    Alcohol use: No    Drug use: No       REVIEW OF SYSTEMS:  Constitution: Negative. Negative for chills, fever and night sweats.   Cardiovascular: Negative for chest pain and syncope.   Respiratory: Negative for cough and shortness of breath.   Gastrointestinal: See HPI. Negative for nausea/vomiting. Negative for abdominal pain.  Genitourinary: See HPI. Negative for discoloration or dysuria.  Skin: Negative for dry skin, itching and rash.   Hematologic/Lymphatic: negative for bleeding/clotting disorders.   Musculoskeletal: Negative for falls and muscle weakness.   Neurological: See HPI. no history of seizures. no history of cranial surgery or  "shunts.  Endocrine: Negative for polydipsia, polyphagia and polyuria.   Allergic/Immunologic: Negative for hives and persistent infections.  Psychiatric/Behavioral: Negative for depression and insomnia.         EXAM:  Ht 5' 1" (1.549 m)   Wt 63.7 kg (140 lb 6.9 oz)   BMI 26.53 kg/m²     General: The patient is a 73 y.o. female in no apparent distress, the patient is orientatied to person, place and time.  Psych: Normal mood and affect  HEENT: Vision grossly intact, hearing intact to the spoken word.  Lungs: Respirations unlabored.  Gait: Normal station and gait, no difficulty with toe or heel walk.   Skin: Cervical skin negative for rashes, lesions, hairy patches and surgical scars.  Range of motion: Cervical range of motion is acceptable. There is posterior cervical and lumbar tenderness to palpation.  Spinal Balance: Global saggital and coronal spinal balance acceptable, no significant for scoliosis and kyphosis.  Musculoskeletal: No pain with the range of motion of the bilateral shoulders and elbows. Normal bulk and contour of the bilateral hands.  Vascular: Bilateral hands warm and well perfused, Radial pulses 2+ bilaterally.  Neurological: Normal strength and tone in all major motor groups in the bilateral upper and lower extremities except for 4/5 in R HF and KE. Normal sensation to light touch in the C5-T1 and L2-S1 dermatomes bilaterally.  Deep tendon reflexes symmetric 2+ in the bilateral upper and lower extremities.  Negative Inverted Radial Reflex and Sapp's bilaterally. Negative Babinski bilaterally.     IMAGING:   Today I independently reviewed the following images and my interpretations are as follows:    AP, Lat and Flex/Ex  upright C-spine films demonstrate spondylosis and DDD without listhesis.    Lumbar Xrs showed spondylosis and DDD. L2-3 significant DDD. L4-5 subtle anterolisthesis measuring 4mm.    MRI cervical from 2021 ([place], on a disc) showed no significant central or foraminal " stenosis.    MRI lumbar from 2021 ([place], on a disc) showed L2-3 severe central and mod b/l (R>L) foraminal stenosis. L3-5 mod central stenosis.    Body mass index is 26.53 kg/m².  Hemoglobin A1C   Date Value Ref Range Status   02/14/2012 9.8 (H) 4.0 - 6.2 % Final       ASSESSMENT/PLAN:  Cervical spondylosis    Lumbar spondylosis  -     X-Ray Scoliosis Complete; Future; Expected date: 05/30/2023    DDD (degenerative disc disease), lumbar    DDD (degenerative disc disease), cervical    Lumbar radiculopathy  -     Procedure Request Order for Pain Management; Future; Expected date: 05/30/2023    Cervical radiculopathy    Dorsalgia, unspecified  -     MRI Lumbar Spine Without Contrast; Future; Expected date: 05/30/2023    Imbalance  -     MRI Thoracic Spine Without Contrast; Future; Expected date: 05/30/2023    Spinal stenosis, cervical region  -     MRI Cervical Spine Without Contrast; Future; Expected date: 05/30/2023    Degenerative spondylolisthesis      Follow up in about 4 weeks (around 6/27/2023).    Patient has cervical spondylosis and BUE radiculopathy. She also has lumbar spondylosis and stenosis with RLE radiculopathy. I discussed the natural history of their diagnoses as well as surgical and nonsurgical treatment options. I educated the patient on the importance of core/back strengthening, correct posture, bending/lifting ergonomics, and low-impact aerobic exercises (walking, elliptical, and aquatherapy). Continue medications and exercises. I ordered L2-3 HERACLIO. Given her imbalance issues, I ordered entire spine MRI to update her stenosis; she wants open MRI. I also ordered scoliosis Xrs to assess her sagittal balance. Patient will follow up in 4 weeks for MRI review. She is a candidate for L2-3 PLDF/TLIF (R) if she fails conservative management.    Jeronimo Rodrigez MD  Orthopaedic Spine Surgeon  Department of Orthopaedic Surgery  [phone]

## 2023-05-31 ENCOUNTER — TELEPHONE (OUTPATIENT)
Dept: PAIN MEDICINE | Facility: CLINIC | Age: 73
End: 2023-05-31
Payer: MEDICARE

## 2023-05-31 NOTE — TELEPHONE ENCOUNTER
Spoke to patient on the phone. She states that she had an epidural 2 months ago and it did nothing.  She is not interested in another epidural injection. She does not think that any epidural injection would be helpful.  I recommended that she speak with Dr. Rodrigez regarding proceeding with surgery. Procedure will be cancelled.    Arvin Weiner

## 2023-06-02 RX ORDER — DIAZEPAM 5 MG/1
5 TABLET ORAL ONCE
Qty: 1 TABLET | Refills: 0 | Status: SHIPPED | OUTPATIENT
Start: 2023-06-02 | End: 2023-06-02

## 2023-06-02 RX ORDER — DIAZEPAM 5 MG/1
5 TABLET ORAL ONCE
COMMUNITY
Start: 2023-06-02 | End: 2023-06-02 | Stop reason: SDUPTHER

## 2023-06-02 NOTE — TELEPHONE ENCOUNTER
----- Message from Jeronimo Rodrigez MD sent at 6/1/2023  9:22 AM CDT -----  Regarding: RE: not interested in epidural  Yassine paredes.    Edwina, can we see if she wants surgery? If so, we will prob need an updated lumbar MRI first and then FU appt. Thanks.     ----- Message -----  From: Arvin Rivers DO  Sent: 5/31/2023   9:49 PM CDT  To: Jeronimo Rodrigez MD  Subject: not interested in epidural                       Hey man,  Just VALERIE. Spoke to the patient on the phone to schedule her procedure, and she did not seem to realize that you had ordered an epidural injection.  She thought it was something else.  She does not want an epidural.  She says they have never worked and she just had one.  Told her she needs to talk to you about surgical options then.  I tried to get her to try one here since the last one was in [facility], but she's not interested.  Thanks    -Arvin

## 2023-06-02 NOTE — TELEPHONE ENCOUNTER
Spoke with patient and she would like the valium filled so she can get her MRI's done please. Also she will see Neuro first then call and let us know if she matthew like to set up a pre-op appointment.

## 2023-06-21 ENCOUNTER — HOSPITAL ENCOUNTER (OUTPATIENT)
Dept: RADIOLOGY | Facility: HOSPITAL | Age: 73
Discharge: HOME | End: 2023-06-21
Attending: ORTHOPAEDIC SURGERY
Payer: MEDICARE

## 2023-06-21 DIAGNOSIS — R26.89 IMBALANCE: ICD-10-CM

## 2023-06-21 DIAGNOSIS — M48.02 SPINAL STENOSIS, CERVICAL REGION: ICD-10-CM

## 2023-06-21 DIAGNOSIS — M54.9 DORSALGIA, UNSPECIFIED: ICD-10-CM

## 2023-06-21 PROCEDURE — 72148 MRI LUMBAR SPINE W/O DYE: CPT | Mod: 26,,, | Performed by: RADIOLOGY

## 2023-06-21 PROCEDURE — 72141 MRI CERVICAL SPINE WITHOUT CONTRAST: ICD-10-PCS | Mod: 26,,, | Performed by: RADIOLOGY

## 2023-06-21 PROCEDURE — 72146 MRI CHEST SPINE W/O DYE: CPT | Mod: TC

## 2023-06-21 PROCEDURE — 72141 MRI NECK SPINE W/O DYE: CPT | Mod: 26,,, | Performed by: RADIOLOGY

## 2023-06-21 PROCEDURE — 72148 MRI LUMBAR SPINE W/O DYE: CPT | Mod: TC

## 2023-06-21 PROCEDURE — 72146 MRI THORACIC SPINE WITHOUT CONTRAST: ICD-10-PCS | Mod: 26,,, | Performed by: RADIOLOGY

## 2023-06-21 PROCEDURE — 72141 MRI NECK SPINE W/O DYE: CPT | Mod: TC

## 2023-06-21 PROCEDURE — 72146 MRI CHEST SPINE W/O DYE: CPT | Mod: 26,,, | Performed by: RADIOLOGY

## 2023-06-21 PROCEDURE — 72148 MRI LUMBAR SPINE WITHOUT CONTRAST: ICD-10-PCS | Mod: 26,,, | Performed by: RADIOLOGY

## 2023-07-03 ENCOUNTER — OFFICE VISIT (OUTPATIENT)
Dept: NEUROSURGERY | Facility: CLINIC | Age: 73
End: 2023-07-03
Payer: MEDICARE

## 2023-07-03 VITALS — HEART RATE: 66 BPM | SYSTOLIC BLOOD PRESSURE: 157 MMHG | DIASTOLIC BLOOD PRESSURE: 78 MMHG

## 2023-07-03 DIAGNOSIS — M48.061 LUMBAR STENOSIS WITHOUT NEUROGENIC CLAUDICATION: Primary | ICD-10-CM

## 2023-07-03 PROCEDURE — 99203 PR OFFICE/OUTPT VISIT, NEW, LEVL III, 30-44 MIN: ICD-10-PCS | Mod: S$PBB,,, | Performed by: STUDENT IN AN ORGANIZED HEALTH CARE EDUCATION/TRAINING PROGRAM

## 2023-07-03 PROCEDURE — 99999 PR PBB SHADOW E&M-EST. PATIENT-LVL III: CPT | Mod: PBBFAC,,, | Performed by: STUDENT IN AN ORGANIZED HEALTH CARE EDUCATION/TRAINING PROGRAM

## 2023-07-03 PROCEDURE — 99213 OFFICE O/P EST LOW 20 MIN: CPT | Mod: PBBFAC | Performed by: STUDENT IN AN ORGANIZED HEALTH CARE EDUCATION/TRAINING PROGRAM

## 2023-07-03 PROCEDURE — 99999 PR PBB SHADOW E&M-EST. PATIENT-LVL III: ICD-10-PCS | Mod: PBBFAC,,, | Performed by: STUDENT IN AN ORGANIZED HEALTH CARE EDUCATION/TRAINING PROGRAM

## 2023-07-03 PROCEDURE — 99203 OFFICE O/P NEW LOW 30 MIN: CPT | Mod: S$PBB,,, | Performed by: STUDENT IN AN ORGANIZED HEALTH CARE EDUCATION/TRAINING PROGRAM

## 2023-07-03 NOTE — PROGRESS NOTES
"Neurosurgery  History & Physical    SUBJECTIVE:     Chief Complaint: RLE radicular pain    History of Present Illness:  73 F with no prior spinal surg hx, nonsmoker presents for evaluation of RLE radicular pain.  Most of her right leg pain "sits" in her right thigh.  She also experiences pain which radiates down right leg into her great toe.  She has no significant left leg pain.  She has chronic low back pain but leg pain is worse than this.  No positions seem to improve her leg pain.  She had an injx in march in [facility] but this provided no relief.  She hasn't done PT in awhile.    Review of patient's allergies indicates:   Allergen Reactions    Ace inhibitors Anaphylaxis, Itching and Swelling    Amlodipine Swelling    Amoxicillin Anaphylaxis    Erythromycin Swelling and Hives     Tongue swelling    Penicillins Anaphylaxis    Losartan Hives     Headaches. Patient is currently taking Losartan for high b/p and states they are feeling fine.    Tramadol Itching    Olmesartan Rash     Other reaction(s): Skin Rashes / Eruption of skin, Benicar       Current Outpatient Medications   Medication Sig Dispense Refill    amitriptyline (ELAVIL) 10 MG tablet Take 1 tablet (10 mg total) by mouth nightly as needed for Insomnia. 30 tablet 2    diclofenac sodium (SOLARAZE) 3 % gel Apply topically 2 (two) times daily.      diclofenac-misoprostol  mg-mcg (ARTHROTEC 50)  mg-mcg per tablet Take 1 tablet by mouth 4 (four) times daily.      estrogen, conjugated,-medroxyprogesterone 0.625-2.5mg (PREMPRO) 0.625-2.5 mg per tablet Take 1 tablet by mouth once daily.      irbesartan (AVAPRO) 150 MG tablet Take 150 mg by mouth every evening.      LIDOcaine (LIDODERM) 5 % Place 1 patch onto the skin every 24 hours. Remove & Discard patch within 12 hours or as directed by MD      LIDOcaine-prilocaine (EMLA) cream Apply topically as needed.      naproxen (NAPROSYN) 500 MG tablet Take 500 mg by mouth 2 (two) times daily with meals.   "    oxyCODONE-acetaminophen (PERCOCET) 7.5-325 mg per tablet Take 1 tablet by mouth every 12 (twelve) hours as needed for Pain. 30 tablet 0    sitagliptan-metformin (JANUMET)  mg per tablet Take 1 tablet by mouth 2 (two) times daily with meals.      tiZANidine (ZANAFLEX) 4 MG tablet Take 4 mg by mouth every 8 (eight) hours.      diazePAM (VALIUM) 5 MG tablet Take 1 tablet (5 mg total) by mouth once. Take medication 30 minutes prior to procedure  for 1 dose 1 tablet 0    meloxicam (MOBIC) 15 MG tablet        No current facility-administered medications for this visit.       Past Medical History:   Diagnosis Date    Diabetes mellitus     Fibromyalgia     Hx of degenerative disc disease     neck & back    Hypertension      Past Surgical History:   Procedure Laterality Date    CATARACT EXTRACTION W/  INTRAOCULAR LENS IMPLANT  2016    TUBAL LIGATION       Family History    None       Social History     Socioeconomic History    Marital status: Single   Tobacco Use    Smoking status: Former     Types: Cigarettes     Quit date:      Years since quittin.5    Smokeless tobacco: Never   Substance and Sexual Activity    Alcohol use: No    Drug use: No       Review of Systems  14 point ROS was negative    OBJECTIVE:     Vital Signs  Pulse: 66  BP: (!) 157/78  Pain Score: 10-Worst pain ever  There is no height or weight on file to calculate BMI.      Physical Exam:    Constitutional: She appears well-developed and well-nourished.     Eyes: Pupils are equal, round, and reactive to light.     Cardiovascular: Normal rate and regular rhythm.     Abdominal: Soft.     Psych/Behavior: She is alert. She is oriented to person, place, and time. She has a normal mood and affect.     Musculoskeletal: Gait is abnormal.        Neck: Range of motion is limited.        Back: Range of motion is limited.        Right Upper Extremities: Muscle strength is 5/5.        Left Upper Extremities: Muscle strength is 5/5.       Right  Lower Extremities: Muscle strength is 5/5.        Left Lower Extremities: Muscle strength is 5/5.     Neurological:        Sensory: There is no sensory deficit in the trunk. There is no sensory deficit in the extremities.        DTRs: DTRs are DTRS NORMAL AND SYMMETRICnormal and symmetric.        Cranial nerves: Cranial nerve(s) II, III, IV, V, VI, VII, VIII, IX, X, XI and XII are intact.     No tsai's    No significant TTP throughout spine    Diagnostic Results:  MRI c spine: mild multilevel degenerative changes without high grade stenosis.  MRI t spine: no stenosis  MRI L spine: severe right foraminal stenosis at L2/3, severe central stenosis at L3/4.  Multilevel mild to moderate facet arthropathy.  I spondy at L4/5.  Flex/ex c spine: no overt instability  Flex/ex L spine: grade I spondy at L4/5 without instability.  Scoli: no mismatch or imbalance.  Reviewed    ASSESSMENT/PLAN:     73 F with chronic low back pain and RLE radicular pain. Her imaging is described above and shows severe right foraminal stenosis at L2/3 and severe central stenosis at L3/4.  I believe she may benefit from an injection at these levels.  She is already established with Dr. Rodrigez and I would like her to fu with him.  She is in agreement with this plan.  -Fu PRN

## 2023-08-08 ENCOUNTER — OFFICE VISIT (OUTPATIENT)
Dept: PODIATRY | Facility: CLINIC | Age: 73
End: 2023-08-08
Payer: MEDICARE

## 2023-08-08 VITALS
DIASTOLIC BLOOD PRESSURE: 80 MMHG | WEIGHT: 140.44 LBS | HEIGHT: 61 IN | SYSTOLIC BLOOD PRESSURE: 157 MMHG | HEART RATE: 57 BPM | BODY MASS INDEX: 26.51 KG/M2

## 2023-08-08 DIAGNOSIS — M79.671 PAIN IN BOTH FEET: ICD-10-CM

## 2023-08-08 DIAGNOSIS — L84 CORN OR CALLUS: ICD-10-CM

## 2023-08-08 DIAGNOSIS — E08.00 DIABETES MELLITUS DUE TO UNDERLYING CONDITION WITH HYPEROSMOLARITY WITHOUT COMA, WITHOUT LONG-TERM CURRENT USE OF INSULIN: Primary | ICD-10-CM

## 2023-08-08 DIAGNOSIS — G57.50 TARSAL TUNNEL SYNDROME, UNSPECIFIED LATERALITY: ICD-10-CM

## 2023-08-08 DIAGNOSIS — M79.672 PAIN IN BOTH FEET: ICD-10-CM

## 2023-08-08 PROCEDURE — 64450 PR NERVE BLOCK INJ, ANES/STEROID, OTHER PERIPHERAL: ICD-10-PCS | Mod: S$PBB,RT,, | Performed by: PODIATRIST

## 2023-08-08 PROCEDURE — 99213 OFFICE O/P EST LOW 20 MIN: CPT | Mod: PBBFAC,25 | Performed by: PODIATRIST

## 2023-08-08 PROCEDURE — 99999 PR PBB SHADOW E&M-EST. PATIENT-LVL III: CPT | Mod: PBBFAC,,, | Performed by: PODIATRIST

## 2023-08-08 PROCEDURE — 64450 NJX AA&/STRD OTHER PN/BRANCH: CPT | Mod: S$PBB,RT,, | Performed by: PODIATRIST

## 2023-08-08 PROCEDURE — 99999 PR PBB SHADOW E&M-EST. PATIENT-LVL III: ICD-10-PCS | Mod: PBBFAC,,, | Performed by: PODIATRIST

## 2023-08-08 PROCEDURE — 99213 OFFICE O/P EST LOW 20 MIN: CPT | Mod: 25,S$PBB,, | Performed by: PODIATRIST

## 2023-08-08 PROCEDURE — 99213 PR OFFICE/OUTPT VISIT, EST, LEVL III, 20-29 MIN: ICD-10-PCS | Mod: 25,S$PBB,, | Performed by: PODIATRIST

## 2023-08-08 PROCEDURE — 64450 NJX AA&/STRD OTHER PN/BRANCH: CPT | Mod: PBBFAC,RT | Performed by: PODIATRIST

## 2023-08-15 ENCOUNTER — TELEPHONE (OUTPATIENT)
Dept: INTERNAL MEDICINE | Facility: CLINIC | Age: 73
End: 2023-08-15
Payer: MEDICARE

## 2023-08-15 NOTE — TELEPHONE ENCOUNTER
We received a request from [facility] for pts most recent progress notes in internal medicine.     I just wanted to let Dr. Thompson staff know that I went ahead and faxed the notes to [phone].

## 2023-08-16 NOTE — PROGRESS NOTES
Subjective:      Patient ID: Chantal Ridley is a 73 y.o. female.    Chief Complaint:   Nail Care and Diabetes Mellitus (PCP-Florence Ayala, FAWN-7/27/2023/[facility] - )    Chantal is a 73 y.o. female who presents to the clinic upon referral from Dr. Veronica parham. provider found  for evaluation and treatment of diabetic feet. Chantal has a past medical history of Diabetes mellitus, Fibromyalgia, degenerative disc disease, and Hypertension. Patient relates no major problem with feet.  Injections have been helping for nerve pain bilateral.  These last for several months.  She would like to discuss these again today.  PCP: [facility]    Date Last Seen by PCP:   Chief Complaint   Patient presents with    Nail Care    Diabetes Mellitus     PCP-Florence Ayala, FAWN-7/27/2023  [facility] [place]         Current shoe gear: Casual shoes    Hemoglobin A1C   Date Value Ref Range Status   02/14/2012 9.8 (H) 4.0 - 6.2 % Final         Review of Systems   Constitutional: Negative for chills, decreased appetite, fever and malaise/fatigue.   HENT:  Negative for congestion, hearing loss, nosebleeds and tinnitus.    Eyes:  Negative for double vision, pain, photophobia and visual disturbance.   Cardiovascular:  Negative for chest pain, claudication, cyanosis and leg swelling.   Respiratory:  Negative for cough, hemoptysis, shortness of breath and wheezing.    Endocrine: Negative for cold intolerance and heat intolerance.   Hematologic/Lymphatic: Negative for adenopathy and bleeding problem.   Skin:  Negative for color change, dry skin, itching, nail changes and suspicious lesions.   Musculoskeletal:  Positive for joint pain and stiffness. Negative for arthritis and myalgias.   Gastrointestinal:  Negative for abdominal pain, jaundice, nausea and vomiting.   Genitourinary:  Negative for dysuria, frequency and hematuria.   Neurological:  Positive for numbness, paresthesias and  sensory change. Negative for difficulty with concentration and loss of balance.   Psychiatric/Behavioral:  Negative for altered mental status, hallucinations and suicidal ideas. The patient is not nervous/anxious.    Allergic/Immunologic: Negative for environmental allergies and persistent infections.           Objective:      Physical Exam  Vitals reviewed.   Constitutional:       Appearance: She is well-developed.   HENT:      Head: Normocephalic and atraumatic.   Cardiovascular:      Pulses:           Dorsalis pedis pulses are 2+ on the right side and 2+ on the left side.        Posterior tibial pulses are 2+ on the right side and 2+ on the left side.   Pulmonary:      Effort: Pulmonary effort is normal.   Musculoskeletal:         General: Normal range of motion.      Comments: Inspection and palpation of the muscles joints and bones of both lower extremities reveal that muscle strength for the anterior lateral and posterior muscle groups and intrinsic muscle groups of the foot are all 5 over 5 symmetrical.  Ankle subtalar midtarsal and digital joint range of motion are within normal limits, nonpainful, without crepitus or effusion.  Patient exhibits a normal angle and base of gait.  Palpation of the tendons reveal no defects.   Skin:     General: Skin is warm and dry.      Capillary Refill: Capillary refill takes 2 to 3 seconds.      Comments: Skin turgor is normal bilaterally.  Skin texture is well hydrated to both lower extremities.  No lesions or rashes or wounds appreciated bilaterally.  Nail plates 1 through 5 bilaterally are within normal limits for length and thickness.  No nail clubbing or incurvation noted.   Neurological:      Mental Status: She is alert and oriented to person, place, and time.      Comments: Sharp dull light touch vibratory proprioceptive sensation are intact bilaterally.  Deep tendon reflexes to patellar and Achilles tendon are symmetrical 2 over 4 bilaterally.  No ankle clonus or  Babinski reflexes noted bilaterally.  Coordination is normal to both feet and lower extremities.  Positive Tinel sign/provocation sign right tarsal tunnel.   Psychiatric:         Behavior: Behavior normal.               Assessment:       Encounter Diagnoses   Name Primary?    Diabetes mellitus due to underlying condition with hyperosmolarity without coma, without long-term current use of insulin Yes    Corn or callus     Pain in both feet      Independent visualization of imaging was performed.  Results were reviewed in detail with patient.       Plan:       Chantal was seen today for nail care and diabetes mellitus.    Diagnoses and all orders for this visit:    Diabetes mellitus due to underlying condition with hyperosmolarity without coma, without long-term current use of insulin  -     DIABETIC SHOES FOR HOME USE    Corn or callus  -     DIABETIC SHOES FOR HOME USE    Pain in both feet  -     DIABETIC SHOES FOR HOME USE      I counseled the patient on her conditions, their implications and medical management.    The nature of the condition, options for management, as well as potential risks and complications were discussed in detail with patient. Patient was amenable to my recommendations and left my office fully informed and will follow up as instructed or sooner if necessary.      Discussed options for peripheral neuropathy/nerve entrapment syndrome including nerve block therapy, surgical nerve entrapment decompression procedures, and various vitamins and supplementation available shown to improve nerve function.    Nerve injection:    The area overlying the right tarsal tunnel nerve(s) was sterilely prepped, verbal consent was obtained, 2 cc's of 0.5% Marcaine plain was infiltrated around the affected nerve(s) for a diagnostic nerve block.  This was well tolerated with no complications.    Shoe inspection. Diabetic Foot Education. Patient reminded of the importance of good nutrition and blood sugar control to  help prevent podiatric complications of diabetes. Patient instructed on proper foot hygeine. We discussed wearing proper shoe gear, daily foot inspections and Diabetic foot education in detail.    Return to clinic in 3-6 months or sooner if problems arise

## 2023-09-07 ENCOUNTER — TELEPHONE (OUTPATIENT)
Dept: PODIATRY | Facility: CLINIC | Age: 73
End: 2023-09-07
Payer: MEDICARE

## 2023-09-07 NOTE — TELEPHONE ENCOUNTER
Spoke with pt in regards to message. Informed pt that provider is out so I need some time to work on this DME order for her. Pt verbalized understanding.

## 2023-09-07 NOTE — TELEPHONE ENCOUNTER
----- Message from Zbigniew Thacker sent at 9/7/2023  3:03 PM CDT -----  Type:  Patient Returning Call    Who Called:pt   Who Left Message for Patient:  Does the patient know what this is regarding?:pt advice   Would the patient rather a call back or a response via [employer]? Call  Best Call Back Number:[phone]  Additional Information: pt is calling in regard to her orthopedic shoes that were supposed to be ordered. The pt called to the place for the shoes and they stated that the paper work wasn't completed correctly and the paper needs to be sent back

## 2023-09-08 DIAGNOSIS — M54.17 LUMBOSACRAL RADICULOPATHY: Primary | ICD-10-CM

## 2023-09-15 ENCOUNTER — TELEPHONE (OUTPATIENT)
Dept: PODIATRY | Facility: CLINIC | Age: 73
End: 2023-09-15
Payer: MEDICARE

## 2023-09-15 NOTE — TELEPHONE ENCOUNTER
I spoke with patient Medicare will not cover shoes with just diagnosis of diabetes with other problems to cover shoes. Message forward to Dr. Braun.

## 2023-11-21 ENCOUNTER — HOSPITAL ENCOUNTER (OUTPATIENT)
Dept: RADIOLOGY | Facility: OTHER | Age: 73
Discharge: HOME | End: 2023-11-21
Attending: ANESTHESIOLOGY
Payer: MEDICARE

## 2023-11-21 ENCOUNTER — OFFICE VISIT (OUTPATIENT)
Dept: PAIN MEDICINE | Facility: CLINIC | Age: 73
End: 2023-11-21
Attending: ANESTHESIOLOGY
Payer: MEDICARE

## 2023-11-21 VITALS
WEIGHT: 142.19 LBS | HEIGHT: 61 IN | RESPIRATION RATE: 18 BRPM | SYSTOLIC BLOOD PRESSURE: 164 MMHG | HEART RATE: 63 BPM | BODY MASS INDEX: 26.85 KG/M2 | DIASTOLIC BLOOD PRESSURE: 87 MMHG | OXYGEN SATURATION: 100 %

## 2023-11-21 DIAGNOSIS — M25.551 RIGHT HIP PAIN: ICD-10-CM

## 2023-11-21 DIAGNOSIS — M47.26 OSTEOARTHRITIS OF SPINE WITH RADICULOPATHY, LUMBAR REGION: ICD-10-CM

## 2023-11-21 DIAGNOSIS — M71.38 CYST OF LUMBAR FACET JOINT: Primary | ICD-10-CM

## 2023-11-21 DIAGNOSIS — M54.17 LUMBOSACRAL RADICULOPATHY: ICD-10-CM

## 2023-11-21 PROCEDURE — 73502 X-RAY EXAM HIP UNI 2-3 VIEWS: CPT | Mod: TC,FY,RT

## 2023-11-21 PROCEDURE — 80355 GABAPENTIN NON-BLOOD: CPT | Performed by: ANESTHESIOLOGY

## 2023-11-21 PROCEDURE — 99204 OFFICE O/P NEW MOD 45 MIN: CPT | Mod: S$PBB,,, | Performed by: ANESTHESIOLOGY

## 2023-11-21 PROCEDURE — 99999 PR PBB SHADOW E&M-EST. PATIENT-LVL V: ICD-10-PCS | Mod: PBBFAC,,, | Performed by: ANESTHESIOLOGY

## 2023-11-21 PROCEDURE — 99999 PR PBB SHADOW E&M-EST. PATIENT-LVL V: CPT | Mod: PBBFAC,,, | Performed by: ANESTHESIOLOGY

## 2023-11-21 PROCEDURE — 73502 XR HIP WITH PELVIS WHEN PERFORMED, 2 OR 3  VIEWS RIGHT: ICD-10-PCS | Mod: 26,RT,, | Performed by: RADIOLOGY

## 2023-11-21 PROCEDURE — 99204 PR OFFICE/OUTPT VISIT, NEW, LEVL IV, 45-59 MIN: ICD-10-PCS | Mod: S$PBB,,, | Performed by: ANESTHESIOLOGY

## 2023-11-21 PROCEDURE — 73502 X-RAY EXAM HIP UNI 2-3 VIEWS: CPT | Mod: 26,RT,, | Performed by: RADIOLOGY

## 2023-11-21 PROCEDURE — 99215 OFFICE O/P EST HI 40 MIN: CPT | Mod: PBBFAC | Performed by: ANESTHESIOLOGY

## 2023-11-21 RX ORDER — OXYCODONE AND ACETAMINOPHEN 10; 325 MG/1; MG/1
1 TABLET ORAL EVERY 12 HOURS PRN
Qty: 60 TABLET | Refills: 0 | Status: SHIPPED | OUTPATIENT
Start: 2023-11-21 | End: 2023-12-11 | Stop reason: SDUPTHER

## 2023-11-21 NOTE — PROGRESS NOTES
Subjective:      Patient ID: Chantal Ridley is a 73 y.o. female.    Chief Complaint: Leg Pain and Back Pain    Referred by: Glendy Shrestha PA-C     HPI  A former patient of Dr. Garland who was on Percocet for chronic pain.  She was given 7.5 to use twice a day.  They had recommended interventions but she declined.  She is interested in interventions if they would help.  She has chronic right lower back pain that radiates to the right groin and thigh.  It also extends this down to the foot anterior and dorsally.  There is sometimes tingling but no numbness.  She does not appreciate weakness.  She is stiff when she gets up and takes her a while to get moving.  There is no imaging of her hip but she thinks she had 1 at [facility].  She had therapy in the remote past and has not been following with the exercises.  She is interested in getting back into therapy.    Past Medical History:   Diagnosis Date    Diabetes mellitus     Fibromyalgia     Hx of degenerative disc disease     neck & back    Hypertension        Past Surgical History:   Procedure Laterality Date    CATARACT EXTRACTION W/  INTRAOCULAR LENS IMPLANT  05/17/2016    TUBAL LIGATION         Review of patient's allergies indicates:   Allergen Reactions    Ace inhibitors Anaphylaxis, Itching and Swelling    Amlodipine Swelling    Amoxicillin Anaphylaxis    Erythromycin Swelling and Hives     Tongue swelling    Penicillins Anaphylaxis    Losartan Hives     Headaches. Patient is currently taking Losartan for high b/p and states they are feeling fine.    Tramadol Itching    Olmesartan Rash     Other reaction(s): Skin Rashes / Eruption of skin, Benicar       Current Outpatient Medications   Medication Sig Dispense Refill    amitriptyline (ELAVIL) 10 MG tablet Take 1 tablet (10 mg total) by mouth nightly as needed for Insomnia. 30 tablet 2    diclofenac sodium (SOLARAZE) 3 % gel Apply topically 2 (two) times daily.      diclofenac-misoprostol  mg-mcg  (ARTHROTEC 50)  mg-mcg per tablet Take 1 tablet by mouth 4 (four) times daily.      estrogen, conjugated,-medroxyprogesterone 0.625-2.5mg (PREMPRO) 0.625-2.5 mg per tablet Take 1 tablet by mouth once daily.      irbesartan (AVAPRO) 150 MG tablet Take 150 mg by mouth every evening.      LIDOcaine (LIDODERM) 5 % Place 1 patch onto the skin every 24 hours. Remove & Discard patch within 12 hours or as directed by MD      LIDOcaine-prilocaine (EMLA) cream Apply topically as needed.      meloxicam (MOBIC) 15 MG tablet       naproxen (NAPROSYN) 500 MG tablet Take 500 mg by mouth 2 (two) times daily with meals.      oxyCODONE-acetaminophen (PERCOCET) 7.5-325 mg per tablet Take 1 tablet by mouth every 12 (twelve) hours as needed for Pain. 30 tablet 0    phenyleph-min oil-petrolatum 0.25-14-74.9 % Oint Place 1 Application rectally.      phenylephrine-cocoa butter 0.25-88.44 % Supp suppository Place 1 suppository rectally.      sitagliptan-metformin (JANUMET)  mg per tablet Take 1 tablet by mouth 2 (two) times daily with meals.      tiZANidine (ZANAFLEX) 4 MG tablet Take 4 mg by mouth every 8 (eight) hours.      diazePAM (VALIUM) 5 MG tablet Take 1 tablet (5 mg total) by mouth once. Take medication 30 minutes prior to procedure  for 1 dose 1 tablet 0    oxyCODONE-acetaminophen (PERCOCET)  mg per tablet Take 1 tablet by mouth every 12 (twelve) hours as needed for Pain. 60 tablet 0     No current facility-administered medications for this visit.       History reviewed. No pertinent family history.    Social History     Socioeconomic History    Marital status: Single   Tobacco Use    Smoking status: Former     Current packs/day: 0.00     Types: Cigarettes     Quit date:      Years since quittin.9    Smokeless tobacco: Never   Substance and Sexual Activity    Alcohol use: No    Drug use: No           ROS        Objective:   BP (!) 164/87 (BP Location: Right arm, Patient Position: Sitting, BP Method:  "Small (Automatic))   Pulse 63   Resp 18   Ht 5' 1" (1.549 m)   Wt 64.5 kg (142 lb 3.2 oz)   SpO2 100%   BMI 26.87 kg/m²   Pain Disability Index Review:       No data to display              Normocephalic.  Atraumatic.  Affect appropriate.  Breathing unlabored.  Extra ocular muscles intact.               General Musculoskeletal Exam   Gait: normal     Right Ankle/Foot Exam     Tests   Heel Walk: able to perform  Tiptoe Walk: able to perform    Left Ankle/Foot Exam     Tests   Heel Walk: able to perform  Tiptoe Walk: able to perform      Right Hip Exam     Range of Motion   External rotation:  normal   Internal rotation:  normal     Tests   Pain w/ forced internal rotation (MATEUS): absent  Wandy: negative    Other   Sensation: normal  Left Hip Exam     Range of Motion   External rotation:  normal   Internal rotation: normal     Tests   Pain w/ forced internal rotation (MATEUS): absent  Wandy: negative    Other   Sensation: normal      Back (L-Spine & T-Spine) / Neck (C-Spine) Exam     Back (L-Spine & T-Spine) Range of Motion   Extension:  normal   Flexion:  normal     Back (L-Spine & T-Spine) Tests   Right Side Tests  Femoral Stretch: positive  Left Side Tests  Femoral Stretch: negative    Comments:  Right hip pain with internal and external rotation as well as Mateus's.      Muscle Strength   Right Lower Extremity   Hip Flexion: 4/5   Quadriceps:  5/5   Hamstrin/5   Gastrocsoleus:  2/5 (due to pain proximally)   EHL:  5/5  Left Lower Extremity   Hip Flexion: 5/5   Quadriceps:  5/5   Hamstrin/5   Gastrocsoleus:  5/5   EHL:  5/5    Reflexes     Left Side  Achilles:  0  Babinski Sign:  absent  Ankle Clonus:  absent  Quadriceps:  0    Right Side   Achilles:  0  Babinski Sign:  absent  Ankle Clonus:  absent  Quadriceps:  0    Vascular Exam     Right Pulses  Dorsalis Pedis:      1+          Left Pulses  Dorsalis Pedis:      1+          Capillary Refill  Right Hand: normal capillary refill  Left Hand: normal " capillary refill        Edema  Right Upper Leg: absent  Left Upper Leg: absent        Assessment:       Encounter Diagnoses   Name Primary?    Lumbosacral radiculopathy     Cyst of lumbar facet joint Yes    Osteoarthritis of spine with radiculopathy, lumbar region     Right hip pain          Plan:   We discussed with the patient the assessment and recommendations. The following is the plan we agreed on:        Chantal was seen today for leg pain and back pain.    Diagnoses and all orders for this visit:    Cyst of lumbar facet joint  -     Cancel: Ambulatory referral/consult to Physical/Occupational Therapy; Future  -     Cancel: Procedure Order to Pain Management; Future  -     Cancel: Procedure Order to Pain Management; Future  -     Procedure Order to Pain Management; Future  -     Pain Clinic Drug Screen  -     oxyCODONE-acetaminophen (PERCOCET)  mg per tablet; Take 1 tablet by mouth every 12 (twelve) hours as needed for Pain.    Lumbosacral radiculopathy  -     Ambulatory referral/consult to Pain Clinic  -     Cancel: Ambulatory referral/consult to Physical/Occupational Therapy; Future  -     Ambulatory referral/consult to Physical/Occupational Therapy; Future  -     Pain Clinic Drug Screen  -     oxyCODONE-acetaminophen (PERCOCET)  mg per tablet; Take 1 tablet by mouth every 12 (twelve) hours as needed for Pain.    Osteoarthritis of spine with radiculopathy, lumbar region  -     Cancel: Ambulatory referral/consult to Physical/Occupational Therapy; Future  -     Ambulatory referral/consult to Physical/Occupational Therapy; Future  -     Pain Clinic Drug Screen  -     oxyCODONE-acetaminophen (PERCOCET)  mg per tablet; Take 1 tablet by mouth every 12 (twelve) hours as needed for Pain.    Right hip pain  -     Ambulatory referral/consult to Physical/Occupational Therapy; Future  -     X-Ray Hip 2 or 3 views Right (with Pelvis when performed); Future  -     Pain Clinic Drug Screen  -      oxyCODONE-acetaminophen (PERCOCET)  mg per tablet; Take 1 tablet by mouth every 12 (twelve) hours as needed for Pain.       IIke MD independently interpretated the MRI L spine done on 6/23 and my findings are:  Multilevel degenerative disease with spinal stenosis L3/4.  Facet cysts seen externally at L3-4 and L4-5 on the right side.

## 2023-11-21 NOTE — H&P (VIEW-ONLY)
Subjective:      Patient ID: Chantal Ridley is a 73 y.o. female.    Chief Complaint: Leg Pain and Back Pain    Referred by: Glendy Shrestha PA-C     HPI  A former patient of Dr. Garland who was on Percocet for chronic pain.  She was given 7.5 to use twice a day.  They had recommended interventions but she declined.  She is interested in interventions if they would help.  She has chronic right lower back pain that radiates to the right groin and thigh.  It also extends this down to the foot anterior and dorsally.  There is sometimes tingling but no numbness.  She does not appreciate weakness.  She is stiff when she gets up and takes her a while to get moving.  There is no imaging of her hip but she thinks she had 1 at [facility].  She had therapy in the remote past and has not been following with the exercises.  She is interested in getting back into therapy.    Past Medical History:   Diagnosis Date    Diabetes mellitus     Fibromyalgia     Hx of degenerative disc disease     neck & back    Hypertension        Past Surgical History:   Procedure Laterality Date    CATARACT EXTRACTION W/  INTRAOCULAR LENS IMPLANT  05/17/2016    TUBAL LIGATION         Review of patient's allergies indicates:   Allergen Reactions    Ace inhibitors Anaphylaxis, Itching and Swelling    Amlodipine Swelling    Amoxicillin Anaphylaxis    Erythromycin Swelling and Hives     Tongue swelling    Penicillins Anaphylaxis    Losartan Hives     Headaches. Patient is currently taking Losartan for high b/p and states they are feeling fine.    Tramadol Itching    Olmesartan Rash     Other reaction(s): Skin Rashes / Eruption of skin, Benicar       Current Outpatient Medications   Medication Sig Dispense Refill    amitriptyline (ELAVIL) 10 MG tablet Take 1 tablet (10 mg total) by mouth nightly as needed for Insomnia. 30 tablet 2    diclofenac sodium (SOLARAZE) 3 % gel Apply topically 2 (two) times daily.      diclofenac-misoprostol  mg-mcg  (ARTHROTEC 50)  mg-mcg per tablet Take 1 tablet by mouth 4 (four) times daily.      estrogen, conjugated,-medroxyprogesterone 0.625-2.5mg (PREMPRO) 0.625-2.5 mg per tablet Take 1 tablet by mouth once daily.      irbesartan (AVAPRO) 150 MG tablet Take 150 mg by mouth every evening.      LIDOcaine (LIDODERM) 5 % Place 1 patch onto the skin every 24 hours. Remove & Discard patch within 12 hours or as directed by MD      LIDOcaine-prilocaine (EMLA) cream Apply topically as needed.      meloxicam (MOBIC) 15 MG tablet       naproxen (NAPROSYN) 500 MG tablet Take 500 mg by mouth 2 (two) times daily with meals.      oxyCODONE-acetaminophen (PERCOCET) 7.5-325 mg per tablet Take 1 tablet by mouth every 12 (twelve) hours as needed for Pain. 30 tablet 0    phenyleph-min oil-petrolatum 0.25-14-74.9 % Oint Place 1 Application rectally.      phenylephrine-cocoa butter 0.25-88.44 % Supp suppository Place 1 suppository rectally.      sitagliptan-metformin (JANUMET)  mg per tablet Take 1 tablet by mouth 2 (two) times daily with meals.      tiZANidine (ZANAFLEX) 4 MG tablet Take 4 mg by mouth every 8 (eight) hours.      diazePAM (VALIUM) 5 MG tablet Take 1 tablet (5 mg total) by mouth once. Take medication 30 minutes prior to procedure  for 1 dose 1 tablet 0    oxyCODONE-acetaminophen (PERCOCET)  mg per tablet Take 1 tablet by mouth every 12 (twelve) hours as needed for Pain. 60 tablet 0     No current facility-administered medications for this visit.       History reviewed. No pertinent family history.    Social History     Socioeconomic History    Marital status: Single   Tobacco Use    Smoking status: Former     Current packs/day: 0.00     Types: Cigarettes     Quit date:      Years since quittin.9    Smokeless tobacco: Never   Substance and Sexual Activity    Alcohol use: No    Drug use: No           ROS        Objective:   BP (!) 164/87 (BP Location: Right arm, Patient Position: Sitting, BP Method:  "Small (Automatic))   Pulse 63   Resp 18   Ht 5' 1" (1.549 m)   Wt 64.5 kg (142 lb 3.2 oz)   SpO2 100%   BMI 26.87 kg/m²   Pain Disability Index Review:       No data to display              Normocephalic.  Atraumatic.  Affect appropriate.  Breathing unlabored.  Extra ocular muscles intact.               General Musculoskeletal Exam   Gait: normal     Right Ankle/Foot Exam     Tests   Heel Walk: able to perform  Tiptoe Walk: able to perform    Left Ankle/Foot Exam     Tests   Heel Walk: able to perform  Tiptoe Walk: able to perform      Right Hip Exam     Range of Motion   External rotation:  normal   Internal rotation:  normal     Tests   Pain w/ forced internal rotation (MATEUS): absent  Wandy: negative    Other   Sensation: normal  Left Hip Exam     Range of Motion   External rotation:  normal   Internal rotation: normal     Tests   Pain w/ forced internal rotation (MATEUS): absent  Wandy: negative    Other   Sensation: normal      Back (L-Spine & T-Spine) / Neck (C-Spine) Exam     Back (L-Spine & T-Spine) Range of Motion   Extension:  normal   Flexion:  normal     Back (L-Spine & T-Spine) Tests   Right Side Tests  Femoral Stretch: positive  Left Side Tests  Femoral Stretch: negative    Comments:  Right hip pain with internal and external rotation as well as Mateus's.      Muscle Strength   Right Lower Extremity   Hip Flexion: 4/5   Quadriceps:  5/5   Hamstrin/5   Gastrocsoleus:  2/5 (due to pain proximally)   EHL:  5/5  Left Lower Extremity   Hip Flexion: 5/5   Quadriceps:  5/5   Hamstrin/5   Gastrocsoleus:  5/5   EHL:  5/5    Reflexes     Left Side  Achilles:  0  Babinski Sign:  absent  Ankle Clonus:  absent  Quadriceps:  0    Right Side   Achilles:  0  Babinski Sign:  absent  Ankle Clonus:  absent  Quadriceps:  0    Vascular Exam     Right Pulses  Dorsalis Pedis:      1+          Left Pulses  Dorsalis Pedis:      1+          Capillary Refill  Right Hand: normal capillary refill  Left Hand: normal " capillary refill        Edema  Right Upper Leg: absent  Left Upper Leg: absent        Assessment:       Encounter Diagnoses   Name Primary?    Lumbosacral radiculopathy     Cyst of lumbar facet joint Yes    Osteoarthritis of spine with radiculopathy, lumbar region     Right hip pain          Plan:   We discussed with the patient the assessment and recommendations. The following is the plan we agreed on:        Chantal was seen today for leg pain and back pain.    Diagnoses and all orders for this visit:    Cyst of lumbar facet joint  -     Cancel: Ambulatory referral/consult to Physical/Occupational Therapy; Future  -     Cancel: Procedure Order to Pain Management; Future  -     Cancel: Procedure Order to Pain Management; Future  -     Procedure Order to Pain Management; Future  -     Pain Clinic Drug Screen  -     oxyCODONE-acetaminophen (PERCOCET)  mg per tablet; Take 1 tablet by mouth every 12 (twelve) hours as needed for Pain.    Lumbosacral radiculopathy  -     Ambulatory referral/consult to Pain Clinic  -     Cancel: Ambulatory referral/consult to Physical/Occupational Therapy; Future  -     Ambulatory referral/consult to Physical/Occupational Therapy; Future  -     Pain Clinic Drug Screen  -     oxyCODONE-acetaminophen (PERCOCET)  mg per tablet; Take 1 tablet by mouth every 12 (twelve) hours as needed for Pain.    Osteoarthritis of spine with radiculopathy, lumbar region  -     Cancel: Ambulatory referral/consult to Physical/Occupational Therapy; Future  -     Ambulatory referral/consult to Physical/Occupational Therapy; Future  -     Pain Clinic Drug Screen  -     oxyCODONE-acetaminophen (PERCOCET)  mg per tablet; Take 1 tablet by mouth every 12 (twelve) hours as needed for Pain.    Right hip pain  -     Ambulatory referral/consult to Physical/Occupational Therapy; Future  -     X-Ray Hip 2 or 3 views Right (with Pelvis when performed); Future  -     Pain Clinic Drug Screen  -      oxyCODONE-acetaminophen (PERCOCET)  mg per tablet; Take 1 tablet by mouth every 12 (twelve) hours as needed for Pain.       IIke MD independently interpretated the MRI L spine done on 6/23 and my findings are:  Multilevel degenerative disease with spinal stenosis L3/4.  Facet cysts seen externally at L3-4 and L4-5 on the right side.

## 2023-11-26 LAB
6MAM UR QL: NOT DETECTED
7AMINOCLONAZEPAM UR QL: NOT DETECTED
A-OH ALPRAZ UR QL: NOT DETECTED
ALPHA-OH-MIDAZOLAM: NOT DETECTED
ALPRAZ UR QL: NOT DETECTED
AMPHET UR QL SCN: NOT DETECTED
ANNOTATION COMMENT IMP: NORMAL
BARBITURATES UR QL: NEGATIVE
BUPRENORPHINE UR QL: NOT DETECTED
BZE UR QL: NEGATIVE
CARBOXYTHC UR QL: NEGATIVE
CARISOPRODOL UR QL: NEGATIVE
CLONAZEPAM UR QL: NOT DETECTED
CODEINE UR QL: NOT DETECTED
CREAT UR-MCNC: 138.5 MG/DL (ref 20–400)
DIAZEPAM UR QL: NOT DETECTED
ETHYL GLUCURONIDE UR QL: NEGATIVE
FENTANYL UR QL: NOT DETECTED
GABAPENTIN: PRESENT
HYDROCODONE UR QL: NOT DETECTED
HYDROMORPHONE UR QL: NOT DETECTED
LORAZEPAM UR QL: NOT DETECTED
MDA UR QL: NOT DETECTED
MDEA UR QL: NOT DETECTED
MDMA UR QL: NOT DETECTED
ME-PHENIDATE UR QL: NOT DETECTED
METHADONE UR QL: NEGATIVE
METHAMPHET UR QL: NOT DETECTED
MIDAZOLAM UR QL SCN: NOT DETECTED
MORPHINE UR QL: NOT DETECTED
NALOXONE: NOT DETECTED
NORBUPRENORPHINE UR QL CFM: NOT DETECTED
NORDIAZEPAM UR QL: NOT DETECTED
NORFENTANYL UR QL: NOT DETECTED
NORHYDROCODONE UR QL CFM: NOT DETECTED
NORMEPERIDINE UR QL CFM: NOT DETECTED
NOROXYCODONE UR QL CFM: PRESENT
NOROXYMORPHONE UR QL SCN: PRESENT
OXAZEPAM UR QL: NOT DETECTED
OXYCODONE UR QL: PRESENT
OXYMORPHONE UR QL: PRESENT
PATHOLOGY STUDY: NORMAL
PCP UR QL: NEGATIVE
PHENTERMINE UR QL: NOT DETECTED
PREGABALIN: NOT DETECTED
SERVICE CMNT-IMP: NORMAL
TAPENTADOL UR QL SCN: NOT DETECTED
TAPENTADOL UR QL SCN: NOT DETECTED
TEMAZEPAM UR QL: NOT DETECTED
TRAMADOL UR QL: NEGATIVE
ZOLPIDEM METABOLITE: NOT DETECTED
ZOLPIDEM UR QL: NOT DETECTED

## 2023-11-27 ENCOUNTER — TELEPHONE (OUTPATIENT)
Dept: PAIN MEDICINE | Facility: CLINIC | Age: 73
End: 2023-11-27
Payer: MEDICARE

## 2023-11-27 NOTE — TELEPHONE ENCOUNTER
Staff will complete the pt prior authorization when we receive it.    ----- Message from Senait Garner sent at 11/27/2023  3:44 PM CST -----  Regarding: prior auth for rx  Name of Who is Calling:KAR BORJA [[id number]]          What is the request in detail: pt's ins co is sending over a request for prior authorization. She said that they only have 72 hours to complete it/ She is asking if you can please fill that out for her. She said that the prior auth is for Percocet 10. She prefers the non-generic/ Please fill out and send back for her.   She said that [employer] is her pharmacy ins and they will be faxing form over to you to fill out. Any questions please call pt   [phone]          Can the clinic reply by [facility]:          What Number to Call Back if not in [facility]: [phone]

## 2023-11-27 NOTE — TELEPHONE ENCOUNTER
----- Message from Senait Pascual sent at 11/27/2023  3:44 PM CST -----  Regarding: prior auth for rx  Name of Who is Calling:KAR BORJA [[id number]]          What is the request in detail: pt's ins co is sending over a request for prior authorization. She said that they only have 72 hours to complete it/ She is asking if you can please fill that out for her. She said that the prior auth is for Percocet 10. She prefers the non-generic/ Please fill out and send back for her.   She said that [employer] is her pharmacy ins and they will be faxing form over to you to fill out. Any questions please call pt   [phone]          Can the clinic reply by [facility]:          What Number to Call Back if not in [facility]: [phone]

## 2023-11-28 ENCOUNTER — TELEPHONE (OUTPATIENT)
Dept: PAIN MEDICINE | Facility: CLINIC | Age: 73
End: 2023-11-28
Payer: MEDICARE

## 2023-12-01 ENCOUNTER — TELEPHONE (OUTPATIENT)
Dept: PAIN MEDICINE | Facility: CLINIC | Age: 73
End: 2023-12-01
Payer: MEDICARE

## 2023-12-01 NOTE — TELEPHONE ENCOUNTER
Staff spoke to Stefany at [facility] [employer] scripts and got patients Rx approved from 11/01/23 to 11/30/24

## 2023-12-01 NOTE — TELEPHONE ENCOUNTER
Staff spoke with pt regarding reschedule appt on 01/05/23 with Chanelle James NP due to provider being out on that day. Pt appt was rescheduled to 01/16/23 for 2:20 am.

## 2023-12-11 ENCOUNTER — OFFICE VISIT (OUTPATIENT)
Dept: PODIATRY | Facility: CLINIC | Age: 73
End: 2023-12-11
Payer: MEDICARE

## 2023-12-11 VITALS
WEIGHT: 142.88 LBS | HEIGHT: 61 IN | HEART RATE: 64 BPM | BODY MASS INDEX: 26.98 KG/M2 | SYSTOLIC BLOOD PRESSURE: 186 MMHG | DIASTOLIC BLOOD PRESSURE: 88 MMHG

## 2023-12-11 DIAGNOSIS — L84 CORN OR CALLUS: ICD-10-CM

## 2023-12-11 DIAGNOSIS — M47.26 OSTEOARTHRITIS OF SPINE WITH RADICULOPATHY, LUMBAR REGION: ICD-10-CM

## 2023-12-11 DIAGNOSIS — E08.00 DIABETES MELLITUS DUE TO UNDERLYING CONDITION WITH HYPEROSMOLARITY WITHOUT COMA, WITHOUT LONG-TERM CURRENT USE OF INSULIN: Primary | ICD-10-CM

## 2023-12-11 DIAGNOSIS — M19.071 PRIMARY OSTEOARTHRITIS OF RIGHT FOOT: ICD-10-CM

## 2023-12-11 DIAGNOSIS — M25.551 RIGHT HIP PAIN: Primary | ICD-10-CM

## 2023-12-11 DIAGNOSIS — M54.17 LUMBOSACRAL RADICULOPATHY: ICD-10-CM

## 2023-12-11 DIAGNOSIS — M79.672 PAIN IN BOTH FEET: ICD-10-CM

## 2023-12-11 DIAGNOSIS — M71.38 CYST OF LUMBAR FACET JOINT: ICD-10-CM

## 2023-12-11 DIAGNOSIS — M79.671 PAIN IN BOTH FEET: ICD-10-CM

## 2023-12-11 PROCEDURE — 20605 DRAIN/INJ JOINT/BURSA W/O US: CPT | Mod: PBBFAC,RT | Performed by: PODIATRIST

## 2023-12-11 PROCEDURE — 99999PBSHW PR PBB SHADOW TECHNICAL ONLY FILED TO HB: Mod: PBBFAC,,,

## 2023-12-11 PROCEDURE — 99999PBSHW PR PBB SHADOW TECHNICAL ONLY FILED TO HB: ICD-10-PCS | Mod: PBBFAC,,,

## 2023-12-11 PROCEDURE — 99213 PR OFFICE/OUTPT VISIT, EST, LEVL III, 20-29 MIN: ICD-10-PCS | Mod: 25,S$PBB,, | Performed by: PODIATRIST

## 2023-12-11 PROCEDURE — 99213 OFFICE O/P EST LOW 20 MIN: CPT | Mod: PBBFAC,25 | Performed by: PODIATRIST

## 2023-12-11 PROCEDURE — 20605 PR DRAIN/INJECT INTERMEDIATE JOINT/BURSA: ICD-10-PCS | Mod: S$PBB,RT,, | Performed by: PODIATRIST

## 2023-12-11 PROCEDURE — 20605 DRAIN/INJ JOINT/BURSA W/O US: CPT | Mod: S$PBB,RT,, | Performed by: PODIATRIST

## 2023-12-11 PROCEDURE — 99999 PR PBB SHADOW E&M-EST. PATIENT-LVL III: ICD-10-PCS | Mod: PBBFAC,,, | Performed by: PODIATRIST

## 2023-12-11 PROCEDURE — 99213 OFFICE O/P EST LOW 20 MIN: CPT | Mod: 25,S$PBB,, | Performed by: PODIATRIST

## 2023-12-11 PROCEDURE — 99999 PR PBB SHADOW E&M-EST. PATIENT-LVL III: CPT | Mod: PBBFAC,,, | Performed by: PODIATRIST

## 2023-12-11 RX ORDER — GABAPENTIN 300 MG/1
300 CAPSULE ORAL DAILY PRN
COMMUNITY
Start: 2023-09-20

## 2023-12-11 RX ORDER — OXYCODONE AND ACETAMINOPHEN 10; 325 MG/1; MG/1
1 TABLET ORAL EVERY 12 HOURS PRN
Qty: 60 TABLET | Refills: 0 | Status: SHIPPED | OUTPATIENT
Start: 2023-12-21 | End: 2024-01-16

## 2023-12-11 RX ORDER — HYDROXYZINE PAMOATE 25 MG/1
25 CAPSULE ORAL DAILY PRN
COMMUNITY
Start: 2023-09-20

## 2023-12-11 RX ORDER — LIDOCAINE 50 MG/G
1 PATCH TOPICAL DAILY
Qty: 30 PATCH | Refills: 6 | Status: SHIPPED | OUTPATIENT
Start: 2023-12-11 | End: 2024-03-12 | Stop reason: SDUPTHER

## 2023-12-11 RX ORDER — TRIAMCINOLONE ACETONIDE 40 MG/ML
40 INJECTION, SUSPENSION INTRA-ARTICULAR; INTRAMUSCULAR
Status: COMPLETED | OUTPATIENT
Start: 2023-12-11 | End: 2023-12-11

## 2023-12-11 RX ORDER — IRBESARTAN 300 MG/1
300 TABLET ORAL
COMMUNITY
Start: 2023-10-09

## 2023-12-11 RX ADMIN — TRIAMCINOLONE ACETONIDE 40 MG: 40 INJECTION, SUSPENSION INTRA-ARTICULAR; INTRAMUSCULAR at 04:12

## 2023-12-11 NOTE — TELEPHONE ENCOUNTER
Patient requesting refill on oxyCODONE-acetaminophen (PERCOCET)  mg   Last office visit 11/21/23   shows last refill on 11/21/23  Patient does not have a pain contract on file with [facility]  Patient last UDS 11/21/23 was consistent with current therapy     CODEINE  Not Detected   Comment: INTERPRETIVE INFORMATION: Codeine, U  Positive Cutoff: 40 ng/mL  Methodology: Mass Spectrometry   MORPHINE  Not Detected   Comment: INTERPRETIVE INFORMATION:Morphine, U  Positive Cutoff: 20 ng/mL  Methodology: Mass Spectrometry   6-ACETYLMORPHINE  Not Detected   Comment: INTERPRETIVE INFORMATION:6-acetylmorphine, U  Positive Cutoff: 20 ng/mL  Methodology: Mass Spectrometry   OXYCODONE  Present   Comment: INTERPRETIVE INFORMATION:Oxycodone, U  Positive Cutoff: 40 ng/mL  Methodology: Mass Spectrometry   NOROYXCODONE  Present   Comment: INTERPRETIVE INFORMATION:Noroxycodone, U  Positive Cutoff: 100 ng/mL  Methodology: Mass Spectrometry   OXYMORPHONE  Present   Comment: INTERPRETIVE INFORMATION:Oxymorphone, U  Positive Cutoff: 40 ng/mL  Methodology: Mass Spectrometry

## 2023-12-11 NOTE — TELEPHONE ENCOUNTER
----- Message from Florence Cedillo sent at 12/11/2023  1:01 PM CST -----  Who Called:        Refill or New Rx: refill         RX Name and Strength:   oxyCODONE-acetaminophen (PERCOCET)  mg per tablet          Is this a 30 day or 90 day RX        Preferred Pharmacy with phone number:  [facility]            Local or Mail Order: local           Would the patient rather a call back or a response via [facility]? Call back         Best Call Back Number:        Additional Information: patient  request call back in reference to state she is having procedure on 12/18/23 and want to know if can send medication to the pharmacy for  on 12/18/23

## 2023-12-16 NOTE — PROGRESS NOTES
Subjective:      Patient ID: Chantal Ridley is a 73 y.o. female.    Chief Complaint:   Foot Pain (Bilateral )    Chantal is a 73 y.o. female who presents to the clinic upon referral from Dr. Veronica parham. provider found  for evaluation and treatment of diabetic feet. Chantal has a past medical history of Diabetes mellitus, Fibromyalgia, degenerative disc disease, and Hypertension. Patient relates no major problem with feet.  Injections have been helping for nerve pain bilateral.  She is here for routine diabetic foot evaluation as well as increased right foot pain which was new.  PCP: [facility]    Date Last Seen by PCP:   Chief Complaint   Patient presents with    Foot Pain     Bilateral          Current shoe gear: Casual shoes    Hemoglobin A1C   Date Value Ref Range Status   02/14/2012 9.8 (H) 4.0 - 6.2 % Final         Review of Systems   Constitutional: Negative for chills, decreased appetite, fever and malaise/fatigue.   HENT:  Negative for congestion, hearing loss, nosebleeds and tinnitus.    Eyes:  Negative for double vision, pain, photophobia and visual disturbance.   Cardiovascular:  Negative for chest pain, claudication, cyanosis and leg swelling.   Respiratory:  Negative for cough, hemoptysis, shortness of breath and wheezing.    Endocrine: Negative for cold intolerance and heat intolerance.   Hematologic/Lymphatic: Negative for adenopathy and bleeding problem.   Skin:  Negative for color change, dry skin, itching, nail changes and suspicious lesions.   Musculoskeletal:  Positive for joint pain and stiffness. Negative for arthritis and myalgias.   Gastrointestinal:  Negative for abdominal pain, jaundice, nausea and vomiting.   Genitourinary:  Negative for dysuria, frequency and hematuria.   Neurological:  Positive for numbness, paresthesias and sensory change. Negative for difficulty with concentration and loss of balance.   Psychiatric/Behavioral:  Negative for altered mental status,  hallucinations and suicidal ideas. The patient is not nervous/anxious.    Allergic/Immunologic: Negative for environmental allergies and persistent infections.           Objective:      Physical Exam  Vitals reviewed.   Constitutional:       Appearance: She is well-developed.   HENT:      Head: Normocephalic and atraumatic.   Cardiovascular:      Pulses:           Dorsalis pedis pulses are 2+ on the right side and 2+ on the left side.        Posterior tibial pulses are 2+ on the right side and 2+ on the left side.   Pulmonary:      Effort: Pulmonary effort is normal.   Musculoskeletal:         General: Normal range of motion.      Comments: Inspection and palpation of the muscles joints and bones of both lower extremities reveal that muscle strength for the anterior lateral and posterior muscle groups and intrinsic muscle groups of the foot are all 5 over 5 symmetrical.     Tenderness to the right 1st metatarsal cuneiform joint.   Skin:     General: Skin is warm and dry.      Capillary Refill: Capillary refill takes 2 to 3 seconds.      Comments: Skin turgor is normal bilaterally.  Skin texture is well hydrated to both lower extremities.  No lesions or rashes or wounds appreciated bilaterally.  Nail plates 1 through 5 bilaterally are within normal limits for length and thickness.  No nail clubbing or incurvation noted.   Neurological:      Mental Status: She is alert and oriented to person, place, and time.      Comments: Sharp dull light touch vibratory proprioceptive sensation are intact bilaterally.  Deep tendon reflexes to patellar and Achilles tendon are symmetrical 2 over 4 bilaterally.  No ankle clonus or Babinski reflexes noted bilaterally.  Coordination is normal to both feet and lower extremities.  Positive Tinel sign/provocation sign right tarsal tunnel.   Psychiatric:         Behavior: Behavior normal.               Assessment:       Encounter Diagnoses   Name Primary?    Diabetes mellitus due to  underlying condition with hyperosmolarity without coma, without long-term current use of insulin Yes    Corn or callus     Pain in both feet     Primary osteoarthritis of right foot      Independent visualization of imaging was performed.  Results were reviewed in detail with patient.       Plan:       Chantal was seen today for foot pain.    Diagnoses and all orders for this visit:    Diabetes mellitus due to underlying condition with hyperosmolarity without coma, without long-term current use of insulin    Corn or callus    Pain in both feet    Primary osteoarthritis of right foot    Other orders  -     LIDOcaine (LIDODERM) 5 %; Place 1 patch onto the skin once daily. Remove & Discard patch within 12 hours or as directed by MD  -     triamcinolone acetonide injection 40 mg      I counseled the patient on her conditions, their implications and medical management.    The nature of the condition, options for management, as well as potential risks and complications were discussed in detail with patient. Patient was amenable to my recommendations and left my office fully informed and will follow up as instructed or sooner if necessary.      Discussed options for peripheral neuropathy/nerve entrapment syndrome including nerve block therapy, surgical nerve entrapment decompression procedures, and various vitamins and supplementation available shown to improve nerve function.      A steroid injection was performed at right 1st metatarsal cuneiform joint using 1% plain Lidocaine and 1 mL/40 mg of Kenalog. This was well tolerated.      Shoe inspection. Diabetic Foot Education. Patient reminded of the importance of good nutrition and blood sugar control to help prevent podiatric complications of diabetes. Patient instructed on proper foot hygeine. We discussed wearing proper shoe gear, daily foot inspections and Diabetic foot education in detail.    Return to clinic in 3-6 months or sooner if problems arise

## 2023-12-18 ENCOUNTER — HOSPITAL ENCOUNTER (OUTPATIENT)
Facility: OTHER | Age: 73
Discharge: HOME | End: 2023-12-18
Attending: ANESTHESIOLOGY | Admitting: ANESTHESIOLOGY
Payer: MEDICARE

## 2023-12-18 VITALS
RESPIRATION RATE: 16 BRPM | TEMPERATURE: 98 F | DIASTOLIC BLOOD PRESSURE: 83 MMHG | WEIGHT: 141.13 LBS | HEART RATE: 61 BPM | BODY MASS INDEX: 26.65 KG/M2 | SYSTOLIC BLOOD PRESSURE: 185 MMHG | HEIGHT: 61 IN | OXYGEN SATURATION: 99 %

## 2023-12-18 DIAGNOSIS — G89.29 CHRONIC PAIN: ICD-10-CM

## 2023-12-18 DIAGNOSIS — M16.11 OSTEOARTHRITIS OF RIGHT HIP, UNSPECIFIED OSTEOARTHRITIS TYPE: Primary | ICD-10-CM

## 2023-12-18 LAB — POCT GLUCOSE: 103 MG/DL (ref 70–110)

## 2023-12-18 PROCEDURE — 20610 DRAIN/INJ JOINT/BURSA W/O US: CPT | Mod: RT | Performed by: ANESTHESIOLOGY

## 2023-12-18 PROCEDURE — 25500020 PHARM REV CODE 255: Performed by: ANESTHESIOLOGY

## 2023-12-18 PROCEDURE — 25000003 PHARM REV CODE 250: Performed by: ANESTHESIOLOGY

## 2023-12-18 PROCEDURE — 20610 PR DRAIN/INJECT LARGE JOINT/BURSA: ICD-10-PCS | Mod: RT,,, | Performed by: ANESTHESIOLOGY

## 2023-12-18 PROCEDURE — 20610 DRAIN/INJ JOINT/BURSA W/O US: CPT | Mod: RT,,, | Performed by: ANESTHESIOLOGY

## 2023-12-18 PROCEDURE — 82947 ASSAY GLUCOSE BLOOD QUANT: CPT | Performed by: ANESTHESIOLOGY

## 2023-12-18 PROCEDURE — 63600175 PHARM REV CODE 636 W HCPCS: Performed by: ANESTHESIOLOGY

## 2023-12-18 RX ORDER — LIDOCAINE HYDROCHLORIDE 20 MG/ML
INJECTION, SOLUTION INFILTRATION; PERINEURAL
Status: DISCONTINUED | OUTPATIENT
Start: 2023-12-18 | End: 2023-12-18 | Stop reason: HOSPADM

## 2023-12-18 RX ORDER — BUPIVACAINE HYDROCHLORIDE 2.5 MG/ML
INJECTION, SOLUTION EPIDURAL; INFILTRATION; INTRACAUDAL
Status: DISCONTINUED | OUTPATIENT
Start: 2023-12-18 | End: 2023-12-18 | Stop reason: HOSPADM

## 2023-12-18 RX ORDER — SODIUM CHLORIDE 9 MG/ML
INJECTION, SOLUTION INTRAVENOUS CONTINUOUS
Status: ACTIVE | OUTPATIENT
Start: 2023-12-18

## 2023-12-18 RX ORDER — TRIAMCINOLONE ACETONIDE 40 MG/ML
INJECTION, SUSPENSION INTRA-ARTICULAR; INTRAMUSCULAR
Status: DISCONTINUED | OUTPATIENT
Start: 2023-12-18 | End: 2023-12-18 | Stop reason: HOSPADM

## 2023-12-18 NOTE — DISCHARGE INSTRUCTIONS

## 2023-12-18 NOTE — DISCHARGE SUMMARY
Discharge Note  Short Stay      SUMMARY     Admit Date: 12/18/2023    Attending Physician: Ike Valdez      Discharge Physician: Ike Valdez      Discharge Date: 12/18/2023 12:00 PM    Procedure(s) (LRB):  INJECTION, JOINT RIGHT HIP (Right)    Final Diagnosis: Cyst of lumbar facet joint [M71.38]    Disposition: Home or self care    Patient Instructions:   Current Discharge Medication List        CONTINUE these medications which have NOT CHANGED    Details   amitriptyline (ELAVIL) 10 MG tablet Take 1 tablet (10 mg total) by mouth nightly as needed for Insomnia.  Qty: 30 tablet, Refills: 2      diazePAM (VALIUM) 5 MG tablet Take 1 tablet (5 mg total) by mouth once. Take medication 30 minutes prior to procedure  for 1 dose  Qty: 1 tablet, Refills: 0      diclofenac sodium (SOLARAZE) 3 % gel Apply topically 2 (two) times daily.      diclofenac-misoprostol  mg-mcg (ARTHROTEC 50)  mg-mcg per tablet Take 1 tablet by mouth 4 (four) times daily.      DULoxetine 60 mg CDRS Take 1 capsule by mouth once daily.      estrogen, conjugated,-medroxyprogesterone 0.625-2.5mg (PREMPRO) 0.625-2.5 mg per tablet Take 1 tablet by mouth once daily.      gabapentin (NEURONTIN) 300 MG capsule Take 300 mg by mouth daily as needed.      hydrOXYzine pamoate (VISTARIL) 25 MG Cap Take 25 mg by mouth daily as needed.      !! irbesartan (AVAPRO) 150 MG tablet Take 150 mg by mouth every evening.      !! irbesartan (AVAPRO) 300 MG tablet Take 300 mg by mouth.      LIDOcaine (LIDODERM) 5 % Place 1 patch onto the skin once daily. Remove & Discard patch within 12 hours or as directed by MD  Qty: 30 patch, Refills: 6      LIDOcaine-prilocaine (EMLA) cream Apply topically as needed.      meloxicam (MOBIC) 15 MG tablet       naproxen (NAPROSYN) 500 MG tablet Take 500 mg by mouth 2 (two) times daily with meals.      oxyCODONE-acetaminophen (PERCOCET)  mg per tablet Take 1 tablet by mouth every 12 (twelve) hours as needed for Pain.  Qty: 60  tablet, Refills: 0    Comments: Quantity prescribed more than 7 day supply? Yes, quantity medically necessary  Associated Diagnoses: Lumbosacral radiculopathy; Cyst of lumbar facet joint; Osteoarthritis of spine with radiculopathy, lumbar region; Right hip pain      oxyCODONE-acetaminophen (PERCOCET) 7.5-325 mg per tablet Take 1 tablet by mouth every 12 (twelve) hours as needed for Pain.  Qty: 30 tablet, Refills: 0    Comments: Quantity prescribed more than 7 day supply? Yes, quantity medically necessary      phenyleph-min oil-petrolatum 0.25-14-74.9 % Oint Place 1 Application rectally.      phenylephrine-cocoa butter 0.25-88.44 % Supp suppository Place 1 suppository rectally.      sitagliptan-metformin (JANUMET)  mg per tablet Take 1 tablet by mouth 2 (two) times daily with meals.      tiZANidine (ZANAFLEX) 4 MG tablet Take 4 mg by mouth every 8 (eight) hours.       !! - Potential duplicate medications found. Please discuss with provider.              Discharge Diagnosis: Cyst of lumbar facet joint [M71.38]  Condition on Discharge: Stable with no complications to procedure   Diet on Discharge: Same as before.  Activity: as per instruction sheet.  Discharge to: Home with a responsible adult.  Follow up: 2-4 weeks       Please call my office or pager at [phone] if experienced any weakness or loss of sensation, fever > 101.5, pain uncontrolled with oral medications, persistent nausea/vomiting/or diarrhea, redness or drainage from the incisions, or any other worrisome concerns. If physician on call was not reached or could not communicate with our office for any reason please go to the nearest emergency department

## 2023-12-18 NOTE — OP NOTE
Hip Joint Injection under Fluoroscopic Guidance    The procedure, risks, benefits, and options were discussed with the patient. There are no contraindications to the procedure. The patent expressed understanding and agreed to the procedure. Informed written consent was obtained prior to the start of the procedure and can be found in the patient's chart.    PATIENT NAME: Chantal Ridley   MRN: [id number]     DATE OF PROCEDURE: 12/18/2023    PROCEDURE: Right Hip Joint Injection under Fluoroscopic Guidance    PRE-OP DIAGNOSIS: Cyst of lumbar facet joint [M71.38]    POST-OP DIAGNOSIS: Cyst of lumbar facet joint [M71.38]    PHYSICIAN: Ike Valdez MD    ASSISTANTS: Chris Farmer MD  [employer] pain fellow      MEDICATIONS INJECTED: Preservative-free Kenalog 40mg with 3cc of Bupivacine 0.25%     LOCAL ANESTHETIC INJECTED: Xylocaine 2%     SEDATION: None    ESTIMATED BLOOD LOSS: None    COMPLICATIONS: None    TECHNIQUE: Time-out was performed to identify the patient and procedure to be performed. With the patient laying in a supine position, the surgical area was prepped and draped in the usual sterile fashion using ChloraPrep and a fenestrated drape. The area overlying the hip joint was determined under fluoroscopy guidance. Skin anesthesia was achieved by injecting Lidocaine 2% over the injection site. A 25 gauge, 3.5 inch spinal quinke needle was introduced under fluoroscopy until the tip reached the greater trochanter. The tip of the needle was hinged cephalad from the greater trochanter into the joint space.  When the needle tip was in the appropriate position, and there was no blood aspiration, Contrast dye  Omnipaque (300mg/mL) was injected to confirm placement and there was no vascular runoff. 4 mL of the medication mixture listed above was injected slowly. The needles were removed and bleeding was nil. A sterile dressing was applied. No specimens collected. The patient tolerated the procedure well.    PRE-PROCEDURE PAIN  SCORE: 10/10    POST-PROCEDURE PAIN SCORE: 0/10    The patient was monitored after the procedure in the recovery area. They were given post-procedure and discharge instructions to follow at home. The patient was discharged in a stable condition.        Ike Valdez MD

## 2024-01-16 ENCOUNTER — OFFICE VISIT (OUTPATIENT)
Dept: PAIN MEDICINE | Facility: CLINIC | Age: 74
End: 2024-01-16
Payer: MEDICARE

## 2024-01-16 VITALS
SYSTOLIC BLOOD PRESSURE: 150 MMHG | BODY MASS INDEX: 26.65 KG/M2 | WEIGHT: 141.13 LBS | OXYGEN SATURATION: 100 % | HEIGHT: 61 IN | HEART RATE: 61 BPM | TEMPERATURE: 99 F | RESPIRATION RATE: 18 BRPM | DIASTOLIC BLOOD PRESSURE: 88 MMHG

## 2024-01-16 DIAGNOSIS — M54.17 LUMBOSACRAL RADICULOPATHY: ICD-10-CM

## 2024-01-16 DIAGNOSIS — M51.36 DDD (DEGENERATIVE DISC DISEASE), LUMBAR: ICD-10-CM

## 2024-01-16 DIAGNOSIS — M54.17 LUMBOSACRAL RADICULOPATHY: Primary | ICD-10-CM

## 2024-01-16 DIAGNOSIS — M25.551 RIGHT HIP PAIN: ICD-10-CM

## 2024-01-16 DIAGNOSIS — M16.11 PRIMARY OSTEOARTHRITIS OF RIGHT HIP: ICD-10-CM

## 2024-01-16 DIAGNOSIS — M47.26 OSTEOARTHRITIS OF SPINE WITH RADICULOPATHY, LUMBAR REGION: ICD-10-CM

## 2024-01-16 DIAGNOSIS — M47.816 LUMBAR SPONDYLOSIS: ICD-10-CM

## 2024-01-16 DIAGNOSIS — M71.38 CYST OF LUMBAR FACET JOINT: ICD-10-CM

## 2024-01-16 DIAGNOSIS — G89.4 CHRONIC PAIN DISORDER: ICD-10-CM

## 2024-01-16 PROCEDURE — 99999 PR PBB SHADOW E&M-EST. PATIENT-LVL V: CPT | Mod: PBBFAC,,, | Performed by: NURSE PRACTITIONER

## 2024-01-16 PROCEDURE — 99214 OFFICE O/P EST MOD 30 MIN: CPT | Mod: S$PBB,,, | Performed by: NURSE PRACTITIONER

## 2024-01-16 PROCEDURE — 99215 OFFICE O/P EST HI 40 MIN: CPT | Mod: PBBFAC | Performed by: NURSE PRACTITIONER

## 2024-01-16 NOTE — H&P (VIEW-ONLY)
Chronic patient Established Note (Follow up visit)      SUBJECTIVE:    Interval History 1/16/2024:  Chantal Ridley presents to the clinic for a follow-up appointment for hip pain. She is s/p right hip joint injection on 12/18/2023. She reports relief for 2 weeks. Then, her pain returned to baseline. She continues to report low back pain that radiates into the anterolateral aspect of her right thigh to her knee. She denies any left leg pain. She does have right hip pain. Her pain is worse with standing and walking. She also reports pain with moving from sitting to standing. She is currently taking Percocet as needed with benefit. She denies any adverse effects. She denies any other health changes. Her pain today is 10/10.    HPI  A former patient of Dr. Garland who was on Percocet for chronic pain.  She was given 7.5 to use twice a day.  They had recommended interventions but she declined.  She is interested in interventions if they would help.  She has chronic right lower back pain that radiates to the right groin and thigh.  It also extends this down to the foot anterior and dorsally.  There is sometimes tingling but no numbness.  She does not appreciate weakness.  She is stiff when she gets up and takes her a while to get moving.  There is no imaging of her hip but she thinks she had 1 at [facility].  She had therapy in the remote past and has not been following with the exercises.  She is interested in getting back into therapy.    Pain Disability Index Review:      1/16/2024     2:16 PM   Last 3 PDI Scores   Pain Disability Index (PDI) 32       Pain Medications:  Percocet    Opioid Contract: yes     report:  Reviewed and consistent with medication use as prescribed.    Pain Procedures:   12/18/2023- Right hip joint injection    Physical Therapy/Home Exercise: yes    Imaging:   Xray Hips 11/21/2023:  CLINICAL HISTORY:  Pain in right hip     FINDINGS:  Two views right hip: There is severe DJD and  impingement change.  No fracture dislocation bone destruction seen.    MRI Lumbar Spine 6/21/2023:  COMPARISON:  X-ray 05/30/2023     FINDINGS:  Lumbar spine alignment is within normal limits. The vertebral body heights are well maintained, with no fracture.  Degenerative endplate sclerosis is seen at L2-3.  No marrow signal abnormality to suggest infiltrative process.     The conus is normal in appearance.  The adjacent soft tissue structures show no significant abnormalities.     L1-L2: Circumferential disc bulge and spondylitic spurring.No significant central canal or neural foraminal narrowing.     L2-L3: Circumferential disc bulge, spondylitic spurring, ligamentum flavum thickening and facet arthropathy.  Moderate central spinal stenosis and severe right-sided foraminal stenosis.     L3-L4: Circumferential disc bulge, spondylitic spurring and facet arthropathy.  Posteriorly projecting synovial cyst arising from the right facet.  Severe central spinal stenosis.  No foraminal stenosis.     L4-L5: Circumferential disc bulge, spondylitic spurring and severe facet arthropathy.  Moderate central canal stenosis and no significant foraminal stenosis.     L5-S1: Circumferential disc bulge, spondylitic spurring and superimposed right paracentral and medial foraminal disc protrusion.  No central canal stenosis.  Moderate left-sided foraminal stenosis.     Impression:     Multilevel extradural degenerative change producing moderate to severe spinal stenosis and areas of foraminal stenosis, most pronounced at L3-4 centrally.    Allergies:   Review of patient's allergies indicates:   Allergen Reactions    Ace inhibitors Anaphylaxis, Itching and Swelling    Amlodipine Swelling    Amoxicillin Anaphylaxis    Erythromycin Swelling and Hives     Tongue swelling    Penicillins Anaphylaxis    Losartan Hives     Headaches. Patient is currently taking Losartan for high b/p and states they are feeling fine.    Tramadol Itching     Olmesartan Rash     Other reaction(s): Skin Rashes / Eruption of skin, Benicar       Current Medications:   Current Outpatient Medications   Medication Sig Dispense Refill    amitriptyline (ELAVIL) 10 MG tablet Take 1 tablet (10 mg total) by mouth nightly as needed for Insomnia. 30 tablet 2    diclofenac sodium (SOLARAZE) 3 % gel Apply topically 2 (two) times daily.      diclofenac-misoprostol  mg-mcg (ARTHROTEC 50)  mg-mcg per tablet Take 1 tablet by mouth 4 (four) times daily.      DULoxetine 60 mg CDRS Take 1 capsule by mouth once daily.      estrogen, conjugated,-medroxyprogesterone 0.625-2.5mg (PREMPRO) 0.625-2.5 mg per tablet Take 1 tablet by mouth once daily.      gabapentin (NEURONTIN) 300 MG capsule Take 300 mg by mouth daily as needed.      hydrOXYzine pamoate (VISTARIL) 25 MG Cap Take 25 mg by mouth daily as needed.      irbesartan (AVAPRO) 150 MG tablet Take 150 mg by mouth every evening.      irbesartan (AVAPRO) 300 MG tablet Take 300 mg by mouth.      LIDOcaine (LIDODERM) 5 % Place 1 patch onto the skin once daily. Remove & Discard patch within 12 hours or as directed by MD 30 patch 6    LIDOcaine-prilocaine (EMLA) cream Apply topically as needed.      meloxicam (MOBIC) 15 MG tablet       naproxen (NAPROSYN) 500 MG tablet Take 500 mg by mouth 2 (two) times daily with meals.      oxyCODONE-acetaminophen (PERCOCET)  mg per tablet Take 1 tablet by mouth every 12 (twelve) hours as needed for Pain. 60 tablet 0    oxyCODONE-acetaminophen (PERCOCET) 7.5-325 mg per tablet Take 1 tablet by mouth every 12 (twelve) hours as needed for Pain. 30 tablet 0    phenyleph-min oil-petrolatum 0.25-14-74.9 % Oint Place 1 Application rectally.      phenylephrine-cocoa butter 0.25-88.44 % Supp suppository Place 1 suppository rectally.      sitagliptan-metformin (JANUMET)  mg per tablet Take 1 tablet by mouth 2 (two) times daily with meals.      tiZANidine (ZANAFLEX) 4 MG tablet Take 4 mg by mouth  every 8 (eight) hours.      diazePAM (VALIUM) 5 MG tablet Take 1 tablet (5 mg total) by mouth once. Take medication 30 minutes prior to procedure  for 1 dose 1 tablet 0     No current facility-administered medications for this visit.     Facility-Administered Medications Ordered in Other Visits   Medication Dose Route Frequency Provider Last Rate Last Admin    0.9%  NaCl infusion   Intravenous Continuous Chris Farmer MD           REVIEW OF SYSTEMS:    GENERAL:  No weight loss, malaise or fevers.  HEENT:  Negative for frequent or significant headaches.  NECK:  Negative for lumps, goiter, pain and significant neck swelling.  RESPIRATORY:  Negative for cough, wheezing or shortness of breath.  CARDIOVASCULAR:  Negative for chest pain, leg swelling or palpitations. HTN  GI:  Negative for abdominal discomfort, blood in stools or black stools or change in bowel habits.  MUSCULOSKELETAL:  See HPI.  SKIN:  Negative for lesions, rash, and itching.  PSYCH:  Negative for sleep disturbance, mood disorder and recent psychosocial stressors.  ENDO: Diabetes  HEMATOLOGY/LYMPHOLOGY:  Negative for prolonged bleeding, bruising easily or swollen nodes.  NEURO:   No history of headaches, syncope, paralysis, seizures or tremors.  All other reviewed and negative other than HPI.    Past Medical History:  Past Medical History:   Diagnosis Date    Diabetes mellitus     Fibromyalgia     Hx of degenerative disc disease     neck & back    Hypertension        Past Surgical History:  Past Surgical History:   Procedure Laterality Date    CATARACT EXTRACTION W/  INTRAOCULAR LENS IMPLANT  05/17/2016    INJECTION OF JOINT Right 12/18/2023    Procedure: INJECTION, JOINT RIGHT HIP;  Surgeon: Ike Valdez MD;  Location: [facility];  Service: Pain Management;  Laterality: Right;  [phone]    TUBAL LIGATION         Family History:  No family history on file.    Social History:  Social History     Socioeconomic History    Marital status: Single  "  Tobacco Use    Smoking status: Former     Current packs/day: 0.00     Types: Cigarettes     Quit date:      Years since quittin.0    Smokeless tobacco: Never   Substance and Sexual Activity    Alcohol use: No    Drug use: No       OBJECTIVE:    BP (!) 150/88   Pulse 61   Temp 98.6 °F (37 °C)   Resp 18   Ht 5' 1" (1.549 m)   Wt 64 kg (141 lb 1.5 oz)   SpO2 100%   BMI 26.66 kg/m²     PHYSICAL EXAMINATION:    General appearance: Well appearing, in no acute distress, alert and oriented x3.  Psych:  Mood and affect appropriate.  Skin: Skin color, texture, turgor normal, no rashes or lesions, in both upper and lower body.  Head/face:  Atraumatic, normocephalic. No palpable lymph nodes  Cor: RRR  Pulm: Symmetric chest rise, no respiratory distress noted.   Back: Straight leg raising in the sitting position is negative to radicular pain. There is pain with palpation over lumbar facet joints bilaterally, right greater than left. Limited ROM with pain on flexion and extension. Positive facet loading bilaterally.   Extremities: No deformities, edema, or skin discoloration. Good capillary refill.  Musculoskeletal: There is pain with internal and external rotation of right hip. FABERs is negative.  Bilateral lower extremity strength is normal and symmetric.  No atrophy or tone abnormalities are noted.  Neuro: No loss of sensation is noted.  Gait: Antalgic- ambulates without assistance.     ASSESSMENT: 73 y.o. year old female with low back and hip pain, consistent with the followin. Lumbosacral radiculopathy  Procedure Order to Pain Management      2. Lumbar spondylosis        3. DDD (degenerative disc disease), lumbar        4. Primary osteoarthritis of right hip        5. Chronic pain disorder              PLAN:     - Previous imaging was reviewed and discussed with the patient today. Labs reviewed.     - She is s/p right hip joint injection with some benefit.     - Schedule for right L3/4 and L4/5 TF " HERACLIO.     - Pain contract signed 11/21/2023.     - Continue Percocet 10/325 mg every 12 hours PRN, #60, refill provided.     - The patient is here today for a refill of current pain medications and they believe these provide effective pain control and improvements in quality of life.  The patient notes no serious side effects, and feels the benefits outweigh the risks.  The patient was reminded of the pain contract that they signed previously as well as the risks and benefits of the medication including possible death.  The updated [place] Board  Pharmacy prescription monitoring program was reviewed, and the patient has been found to be compliant with current treatment plan. Medication management provided by Dr. Valdez.     - UDS from 11/21/2023 reviewed and consistent.     - I have stressed the importance of physical activity and a home exercise plan to help with pain and improve health.    - RTC 2 weeks after above procedure.     - Counseled patient regarding the importance of activity modification.    The above plan and management options were discussed at length with patient. Patient is in agreement with the above and verbalized understanding.    Chanelle James  01/16/2024

## 2024-01-16 NOTE — PROGRESS NOTES
Chronic patient Established Note (Follow up visit)      SUBJECTIVE:    Interval History 1/16/2024:  Chantal Ridley presents to the clinic for a follow-up appointment for hip pain. She is s/p right hip joint injection on 12/18/2023. She reports relief for 2 weeks. Then, her pain returned to baseline. She continues to report low back pain that radiates into the anterolateral aspect of her right thigh to her knee. She denies any left leg pain. She does have right hip pain. Her pain is worse with standing and walking. She also reports pain with moving from sitting to standing. She is currently taking Percocet as needed with benefit. She denies any adverse effects. She denies any other health changes. Her pain today is 10/10.    HPI  A former patient of Dr. Garland who was on Percocet for chronic pain.  She was given 7.5 to use twice a day.  They had recommended interventions but she declined.  She is interested in interventions if they would help.  She has chronic right lower back pain that radiates to the right groin and thigh.  It also extends this down to the foot anterior and dorsally.  There is sometimes tingling but no numbness.  She does not appreciate weakness.  She is stiff when she gets up and takes her a while to get moving.  There is no imaging of her hip but she thinks she had 1 at [facility].  She had therapy in the remote past and has not been following with the exercises.  She is interested in getting back into therapy.    Pain Disability Index Review:      1/16/2024     2:16 PM   Last 3 PDI Scores   Pain Disability Index (PDI) 32       Pain Medications:  Percocet    Opioid Contract: yes     report:  Reviewed and consistent with medication use as prescribed.    Pain Procedures:   12/18/2023- Right hip joint injection    Physical Therapy/Home Exercise: yes    Imaging:   Xray Hips 11/21/2023:  CLINICAL HISTORY:  Pain in right hip     FINDINGS:  Two views right hip: There is severe DJD and  impingement change.  No fracture dislocation bone destruction seen.    MRI Lumbar Spine 6/21/2023:  COMPARISON:  X-ray 05/30/2023     FINDINGS:  Lumbar spine alignment is within normal limits. The vertebral body heights are well maintained, with no fracture.  Degenerative endplate sclerosis is seen at L2-3.  No marrow signal abnormality to suggest infiltrative process.     The conus is normal in appearance.  The adjacent soft tissue structures show no significant abnormalities.     L1-L2: Circumferential disc bulge and spondylitic spurring.No significant central canal or neural foraminal narrowing.     L2-L3: Circumferential disc bulge, spondylitic spurring, ligamentum flavum thickening and facet arthropathy.  Moderate central spinal stenosis and severe right-sided foraminal stenosis.     L3-L4: Circumferential disc bulge, spondylitic spurring and facet arthropathy.  Posteriorly projecting synovial cyst arising from the right facet.  Severe central spinal stenosis.  No foraminal stenosis.     L4-L5: Circumferential disc bulge, spondylitic spurring and severe facet arthropathy.  Moderate central canal stenosis and no significant foraminal stenosis.     L5-S1: Circumferential disc bulge, spondylitic spurring and superimposed right paracentral and medial foraminal disc protrusion.  No central canal stenosis.  Moderate left-sided foraminal stenosis.     Impression:     Multilevel extradural degenerative change producing moderate to severe spinal stenosis and areas of foraminal stenosis, most pronounced at L3-4 centrally.    Allergies:   Review of patient's allergies indicates:   Allergen Reactions    Ace inhibitors Anaphylaxis, Itching and Swelling    Amlodipine Swelling    Amoxicillin Anaphylaxis    Erythromycin Swelling and Hives     Tongue swelling    Penicillins Anaphylaxis    Losartan Hives     Headaches. Patient is currently taking Losartan for high b/p and states they are feeling fine.    Tramadol Itching     Olmesartan Rash     Other reaction(s): Skin Rashes / Eruption of skin, Benicar       Current Medications:   Current Outpatient Medications   Medication Sig Dispense Refill    amitriptyline (ELAVIL) 10 MG tablet Take 1 tablet (10 mg total) by mouth nightly as needed for Insomnia. 30 tablet 2    diclofenac sodium (SOLARAZE) 3 % gel Apply topically 2 (two) times daily.      diclofenac-misoprostol  mg-mcg (ARTHROTEC 50)  mg-mcg per tablet Take 1 tablet by mouth 4 (four) times daily.      DULoxetine 60 mg CDRS Take 1 capsule by mouth once daily.      estrogen, conjugated,-medroxyprogesterone 0.625-2.5mg (PREMPRO) 0.625-2.5 mg per tablet Take 1 tablet by mouth once daily.      gabapentin (NEURONTIN) 300 MG capsule Take 300 mg by mouth daily as needed.      hydrOXYzine pamoate (VISTARIL) 25 MG Cap Take 25 mg by mouth daily as needed.      irbesartan (AVAPRO) 150 MG tablet Take 150 mg by mouth every evening.      irbesartan (AVAPRO) 300 MG tablet Take 300 mg by mouth.      LIDOcaine (LIDODERM) 5 % Place 1 patch onto the skin once daily. Remove & Discard patch within 12 hours or as directed by MD 30 patch 6    LIDOcaine-prilocaine (EMLA) cream Apply topically as needed.      meloxicam (MOBIC) 15 MG tablet       naproxen (NAPROSYN) 500 MG tablet Take 500 mg by mouth 2 (two) times daily with meals.      oxyCODONE-acetaminophen (PERCOCET)  mg per tablet Take 1 tablet by mouth every 12 (twelve) hours as needed for Pain. 60 tablet 0    oxyCODONE-acetaminophen (PERCOCET) 7.5-325 mg per tablet Take 1 tablet by mouth every 12 (twelve) hours as needed for Pain. 30 tablet 0    phenyleph-min oil-petrolatum 0.25-14-74.9 % Oint Place 1 Application rectally.      phenylephrine-cocoa butter 0.25-88.44 % Supp suppository Place 1 suppository rectally.      sitagliptan-metformin (JANUMET)  mg per tablet Take 1 tablet by mouth 2 (two) times daily with meals.      tiZANidine (ZANAFLEX) 4 MG tablet Take 4 mg by mouth  every 8 (eight) hours.      diazePAM (VALIUM) 5 MG tablet Take 1 tablet (5 mg total) by mouth once. Take medication 30 minutes prior to procedure  for 1 dose 1 tablet 0     No current facility-administered medications for this visit.     Facility-Administered Medications Ordered in Other Visits   Medication Dose Route Frequency Provider Last Rate Last Admin    0.9%  NaCl infusion   Intravenous Continuous Chris Farmer MD           REVIEW OF SYSTEMS:    GENERAL:  No weight loss, malaise or fevers.  HEENT:  Negative for frequent or significant headaches.  NECK:  Negative for lumps, goiter, pain and significant neck swelling.  RESPIRATORY:  Negative for cough, wheezing or shortness of breath.  CARDIOVASCULAR:  Negative for chest pain, leg swelling or palpitations. HTN  GI:  Negative for abdominal discomfort, blood in stools or black stools or change in bowel habits.  MUSCULOSKELETAL:  See HPI.  SKIN:  Negative for lesions, rash, and itching.  PSYCH:  Negative for sleep disturbance, mood disorder and recent psychosocial stressors.  ENDO: Diabetes  HEMATOLOGY/LYMPHOLOGY:  Negative for prolonged bleeding, bruising easily or swollen nodes.  NEURO:   No history of headaches, syncope, paralysis, seizures or tremors.  All other reviewed and negative other than HPI.    Past Medical History:  Past Medical History:   Diagnosis Date    Diabetes mellitus     Fibromyalgia     Hx of degenerative disc disease     neck & back    Hypertension        Past Surgical History:  Past Surgical History:   Procedure Laterality Date    CATARACT EXTRACTION W/  INTRAOCULAR LENS IMPLANT  05/17/2016    INJECTION OF JOINT Right 12/18/2023    Procedure: INJECTION, JOINT RIGHT HIP;  Surgeon: Ike Valdez MD;  Location: [facility];  Service: Pain Management;  Laterality: Right;  [phone]    TUBAL LIGATION         Family History:  No family history on file.    Social History:  Social History     Socioeconomic History    Marital status: Single  "  Tobacco Use    Smoking status: Former     Current packs/day: 0.00     Types: Cigarettes     Quit date:      Years since quittin.0    Smokeless tobacco: Never   Substance and Sexual Activity    Alcohol use: No    Drug use: No       OBJECTIVE:    BP (!) 150/88   Pulse 61   Temp 98.6 °F (37 °C)   Resp 18   Ht 5' 1" (1.549 m)   Wt 64 kg (141 lb 1.5 oz)   SpO2 100%   BMI 26.66 kg/m²     PHYSICAL EXAMINATION:    General appearance: Well appearing, in no acute distress, alert and oriented x3.  Psych:  Mood and affect appropriate.  Skin: Skin color, texture, turgor normal, no rashes or lesions, in both upper and lower body.  Head/face:  Atraumatic, normocephalic. No palpable lymph nodes  Cor: RRR  Pulm: Symmetric chest rise, no respiratory distress noted.   Back: Straight leg raising in the sitting position is negative to radicular pain. There is pain with palpation over lumbar facet joints bilaterally, right greater than left. Limited ROM with pain on flexion and extension. Positive facet loading bilaterally.   Extremities: No deformities, edema, or skin discoloration. Good capillary refill.  Musculoskeletal: There is pain with internal and external rotation of right hip. FABERs is negative.  Bilateral lower extremity strength is normal and symmetric.  No atrophy or tone abnormalities are noted.  Neuro: No loss of sensation is noted.  Gait: Antalgic- ambulates without assistance.     ASSESSMENT: 73 y.o. year old female with low back and hip pain, consistent with the followin. Lumbosacral radiculopathy  Procedure Order to Pain Management      2. Lumbar spondylosis        3. DDD (degenerative disc disease), lumbar        4. Primary osteoarthritis of right hip        5. Chronic pain disorder              PLAN:     - Previous imaging was reviewed and discussed with the patient today. Labs reviewed.     - She is s/p right hip joint injection with some benefit.     - Schedule for right L3/4 and L4/5 TF " HERACLIO.     - Pain contract signed 11/21/2023.     - Continue Percocet 10/325 mg every 12 hours PRN, #60, refill provided.     - The patient is here today for a refill of current pain medications and they believe these provide effective pain control and improvements in quality of life.  The patient notes no serious side effects, and feels the benefits outweigh the risks.  The patient was reminded of the pain contract that they signed previously as well as the risks and benefits of the medication including possible death.  The updated [place] Board  Pharmacy prescription monitoring program was reviewed, and the patient has been found to be compliant with current treatment plan. Medication management provided by Dr. Valdez.     - UDS from 11/21/2023 reviewed and consistent.     - I have stressed the importance of physical activity and a home exercise plan to help with pain and improve health.    - RTC 2 weeks after above procedure.     - Counseled patient regarding the importance of activity modification.    The above plan and management options were discussed at length with patient. Patient is in agreement with the above and verbalized understanding.    Chanelle James  01/16/2024

## 2024-01-17 DIAGNOSIS — M54.17 LUMBOSACRAL RADICULOPATHY: Primary | ICD-10-CM

## 2024-01-17 RX ORDER — OXYCODONE AND ACETAMINOPHEN 10; 325 MG/1; MG/1
1 TABLET ORAL EVERY 12 HOURS PRN
Qty: 60 TABLET | Refills: 0 | Status: SHIPPED | OUTPATIENT
Start: 2024-01-17 | End: 2024-02-19 | Stop reason: SDUPTHER

## 2024-02-05 ENCOUNTER — HOSPITAL ENCOUNTER (OUTPATIENT)
Facility: OTHER | Age: 74
Discharge: HOME | End: 2024-02-05
Attending: ANESTHESIOLOGY | Admitting: ANESTHESIOLOGY
Payer: MEDICARE

## 2024-02-05 VITALS
OXYGEN SATURATION: 96 % | WEIGHT: 141 LBS | RESPIRATION RATE: 15 BRPM | BODY MASS INDEX: 26.62 KG/M2 | SYSTOLIC BLOOD PRESSURE: 182 MMHG | HEART RATE: 56 BPM | TEMPERATURE: 99 F | DIASTOLIC BLOOD PRESSURE: 79 MMHG | HEIGHT: 61 IN

## 2024-02-05 DIAGNOSIS — M54.17 LUMBOSACRAL RADICULOPATHY: Primary | ICD-10-CM

## 2024-02-05 DIAGNOSIS — G89.29 CHRONIC PAIN: ICD-10-CM

## 2024-02-05 DIAGNOSIS — M51.36 DDD (DEGENERATIVE DISC DISEASE), LUMBAR: ICD-10-CM

## 2024-02-05 LAB — POCT GLUCOSE: 101 MG/DL (ref 70–110)

## 2024-02-05 PROCEDURE — 25500020 PHARM REV CODE 255: Performed by: ANESTHESIOLOGY

## 2024-02-05 PROCEDURE — 82947 ASSAY GLUCOSE BLOOD QUANT: CPT | Performed by: ANESTHESIOLOGY

## 2024-02-05 PROCEDURE — 64484 NJX AA&/STRD TFRM EPI L/S EA: CPT | Mod: RT,,, | Performed by: ANESTHESIOLOGY

## 2024-02-05 PROCEDURE — 64484 NJX AA&/STRD TFRM EPI L/S EA: CPT | Mod: RT | Performed by: ANESTHESIOLOGY

## 2024-02-05 PROCEDURE — 64483 NJX AA&/STRD TFRM EPI L/S 1: CPT | Mod: RT,,, | Performed by: ANESTHESIOLOGY

## 2024-02-05 PROCEDURE — 64483 NJX AA&/STRD TFRM EPI L/S 1: CPT | Mod: RT | Performed by: ANESTHESIOLOGY

## 2024-02-05 PROCEDURE — 25000003 PHARM REV CODE 250: Performed by: STUDENT IN AN ORGANIZED HEALTH CARE EDUCATION/TRAINING PROGRAM

## 2024-02-05 PROCEDURE — 25000003 PHARM REV CODE 250: Performed by: ANESTHESIOLOGY

## 2024-02-05 PROCEDURE — 63600175 PHARM REV CODE 636 W HCPCS: Performed by: ANESTHESIOLOGY

## 2024-02-05 RX ORDER — LIDOCAINE HYDROCHLORIDE 10 MG/ML
INJECTION, SOLUTION EPIDURAL; INFILTRATION; INTRACAUDAL; PERINEURAL
Status: DISCONTINUED | OUTPATIENT
Start: 2024-02-05 | End: 2024-02-05 | Stop reason: HOSPADM

## 2024-02-05 RX ORDER — FENTANYL CITRATE 50 UG/ML
INJECTION, SOLUTION INTRAMUSCULAR; INTRAVENOUS
Status: DISCONTINUED | OUTPATIENT
Start: 2024-02-05 | End: 2024-02-05 | Stop reason: HOSPADM

## 2024-02-05 RX ORDER — LIDOCAINE HYDROCHLORIDE 20 MG/ML
INJECTION, SOLUTION INFILTRATION; PERINEURAL
Status: DISCONTINUED | OUTPATIENT
Start: 2024-02-05 | End: 2024-02-05 | Stop reason: HOSPADM

## 2024-02-05 RX ORDER — SODIUM CHLORIDE 9 MG/ML
INJECTION, SOLUTION INTRAVENOUS CONTINUOUS
Status: DISCONTINUED | OUTPATIENT
Start: 2024-02-05 | End: 2024-02-05 | Stop reason: HOSPADM

## 2024-02-05 RX ORDER — MIDAZOLAM HYDROCHLORIDE 1 MG/ML
INJECTION INTRAMUSCULAR; INTRAVENOUS
Status: DISCONTINUED | OUTPATIENT
Start: 2024-02-05 | End: 2024-02-05 | Stop reason: HOSPADM

## 2024-02-05 RX ORDER — DEXAMETHASONE SODIUM PHOSPHATE 10 MG/ML
INJECTION INTRAMUSCULAR; INTRAVENOUS
Status: DISCONTINUED | OUTPATIENT
Start: 2024-02-05 | End: 2024-02-05 | Stop reason: HOSPADM

## 2024-02-05 NOTE — OP NOTE
Lumbar Transforaminal Epidural Steroid Injection under Fluoroscopic Guidance    The procedure, risks, benefits, and options were discussed with the patient. There are no contraindications to the procedure. The patent expressed understanding and agreed to the procedure. Informed written consent was obtained prior to the start of the procedure and can be found in the patient's chart.    PATIENT NAME: Chantal Ridley   MRN: [id number]     DATE OF PROCEDURE: 02/05/2024    PROCEDURE:  Right  L3/4 and L4/5 Lumbar Transforaminal Epidural Steroid Injection under Fluoroscopic Guidance    PRE-OP DIAGNOSIS: Lumbosacral radiculopathy [M54.17] Lumbar radiculopathy [M54.16]    POST-OP DIAGNOSIS: Same    PHYSICIAN: Ike Valdez MD    ASSISTANTS: none     MEDICATIONS INJECTED: Preservative-free Decadron 10mg with 5cc of Lidocaine 1% MPF     LOCAL ANESTHETIC INJECTED: Xylocaine 2%     SEDATION: Versed 2mg and Fentanyl 25mcg                                                                                                                                                                                     Conscious sedation ordered by M.D. Patient re-evaluation prior to administration of conscious sedation. No changes noted in patient's status from initial evaluation. The patient's vital signs were monitored by RN and patient remained hemodynamically stable throughout the procedure.    Event Time In   Sedation Start 1531   Sedation End 1539       ESTIMATED BLOOD LOSS: None    COMPLICATIONS: None    TECHNIQUE: Time-out was performed to identify the patient and procedure to be performed. With the patient laying in a prone position, the surgical area was prepped and draped in the usual sterile fashion using ChloraPrep and a fenestrated drape.The levels were determined under fluoroscopy guidance. Skin anesthesia was achieved by injecting Lidocaine 2% over the injection sites. The transforaminal spaces were then approached with a 22 gauge, 3.5 inch  spinal quinke needle that was introduced under fluoroscopic guidance in the AP and Lateral views. Once the needle tip was in the area of the transforaminal space, and there was no blood, CSF or paraesthesias, contrast dye Omnipaque (300mg/mL) was injected to confirm placement and there was no vascular runoff. Fluoroscopic imaging in the AP and lateral views revealed a clear outline of the spinal nerve with proximal spread of agent through the neural foramen into the epidural space. 3 mL of the medication mixture listed above was injected slowly at each site. Displacement of the radio opaque contrast after injection of the medication confirmed that the medication went into the area of the transforaminal spaces. The needles were removed and bleeding was nil. A sterile dressing was applied. No specimens collected. The patient tolerated the procedure well.     PRE-PROCEDURE PAIN SCORE: 10/10    POST-PROCEDURE PAIN SCORE: 7/10    The patient was monitored after the procedure in the recovery area. They were given post-procedure and discharge instructions to follow at home. The patient was discharged in a stable condition.    Ike Valdez MD

## 2024-02-05 NOTE — DISCHARGE INSTRUCTIONS

## 2024-02-05 NOTE — DISCHARGE SUMMARY
Discharge Note  Short Stay      SUMMARY     Admit Date: 2/5/2024    Attending Physician: Ike Valdez      Discharge Physician: Ike Valdez      Discharge Date: 2/5/2024 3:31 PM    Procedure(s) (LRB):  LUMBAR TRANSFORAMINAL RIGHT L3/4 AND L4/5 (Right)    Final Diagnosis: Lumbosacral radiculopathy [M54.17]    Disposition: Home or self care    Patient Instructions:   Current Discharge Medication List        CONTINUE these medications which have NOT CHANGED    Details   amitriptyline (ELAVIL) 10 MG tablet Take 1 tablet (10 mg total) by mouth nightly as needed for Insomnia.  Qty: 30 tablet, Refills: 2      diazePAM (VALIUM) 5 MG tablet Take 1 tablet (5 mg total) by mouth once. Take medication 30 minutes prior to procedure  for 1 dose  Qty: 1 tablet, Refills: 0      diclofenac sodium (SOLARAZE) 3 % gel Apply topically 2 (two) times daily.      diclofenac-misoprostol  mg-mcg (ARTHROTEC 50)  mg-mcg per tablet Take 1 tablet by mouth 4 (four) times daily.      DULoxetine 60 mg CDRS Take 1 capsule by mouth once daily.      estrogen, conjugated,-medroxyprogesterone 0.625-2.5mg (PREMPRO) 0.625-2.5 mg per tablet Take 1 tablet by mouth once daily.      gabapentin (NEURONTIN) 300 MG capsule Take 300 mg by mouth daily as needed.      hydrOXYzine pamoate (VISTARIL) 25 MG Cap Take 25 mg by mouth daily as needed.      !! irbesartan (AVAPRO) 150 MG tablet Take 150 mg by mouth every evening.      !! irbesartan (AVAPRO) 300 MG tablet Take 300 mg by mouth.      LIDOcaine (LIDODERM) 5 % Place 1 patch onto the skin once daily. Remove & Discard patch within 12 hours or as directed by MD  Qty: 30 patch, Refills: 6      LIDOcaine-prilocaine (EMLA) cream Apply topically as needed.      meloxicam (MOBIC) 15 MG tablet       naproxen (NAPROSYN) 500 MG tablet Take 500 mg by mouth 2 (two) times daily with meals.      oxyCODONE-acetaminophen (PERCOCET)  mg per tablet Take 1 tablet by mouth every 12 (twelve) hours as needed for  Pain.  Qty: 60 tablet, Refills: 0    Comments: Quantity prescribed more than 7 day supply? Yes, quantity medically necessary  Associated Diagnoses: Lumbosacral radiculopathy; Cyst of lumbar facet joint; Osteoarthritis of spine with radiculopathy, lumbar region; Right hip pain      phenyleph-min oil-petrolatum 0.25-14-74.9 % Oint Place 1 Application rectally.      phenylephrine-cocoa butter 0.25-88.44 % Supp suppository Place 1 suppository rectally.      sitagliptan-metformin (JANUMET)  mg per tablet Take 1 tablet by mouth 2 (two) times daily with meals.      tiZANidine (ZANAFLEX) 4 MG tablet Take 4 mg by mouth every 8 (eight) hours.       !! - Potential duplicate medications found. Please discuss with provider.              Discharge Diagnosis: Lumbosacral radiculopathy [M54.17]  Condition on Discharge: Stable with no complications to procedure   Diet on Discharge: Same as before.  Activity: as per instruction sheet.  Discharge to: Home with a responsible adult.  Follow up: 2-4 weeks       Please call my office or pager at [phone] if experienced any weakness or loss of sensation, fever > 101.5, pain uncontrolled with oral medications, persistent nausea/vomiting/or diarrhea, redness or drainage from the incisions, or any other worrisome concerns. If physician on call was not reached or could not communicate with our office for any reason please go to the nearest emergency department

## 2024-02-19 ENCOUNTER — OFFICE VISIT (OUTPATIENT)
Dept: PAIN MEDICINE | Facility: CLINIC | Age: 74
End: 2024-02-19
Payer: MEDICARE

## 2024-02-19 VITALS
HEIGHT: 61 IN | WEIGHT: 145.94 LBS | HEART RATE: 55 BPM | SYSTOLIC BLOOD PRESSURE: 178 MMHG | DIASTOLIC BLOOD PRESSURE: 79 MMHG | BODY MASS INDEX: 27.55 KG/M2

## 2024-02-19 DIAGNOSIS — G89.4 CHRONIC PAIN DISORDER: ICD-10-CM

## 2024-02-19 DIAGNOSIS — Z79.891 ENCOUNTER FOR LONG-TERM OPIATE ANALGESIC USE: ICD-10-CM

## 2024-02-19 DIAGNOSIS — M54.17 LUMBOSACRAL RADICULOPATHY: Primary | ICD-10-CM

## 2024-02-19 DIAGNOSIS — G89.4 CHRONIC PAIN SYNDROME: ICD-10-CM

## 2024-02-19 DIAGNOSIS — M71.38 CYST OF LUMBAR FACET JOINT: ICD-10-CM

## 2024-02-19 DIAGNOSIS — M47.816 LUMBAR SPONDYLOSIS: ICD-10-CM

## 2024-02-19 DIAGNOSIS — M51.36 DDD (DEGENERATIVE DISC DISEASE), LUMBAR: ICD-10-CM

## 2024-02-19 DIAGNOSIS — M54.17 LUMBOSACRAL RADICULOPATHY: ICD-10-CM

## 2024-02-19 DIAGNOSIS — M47.26 OSTEOARTHRITIS OF SPINE WITH RADICULOPATHY, LUMBAR REGION: ICD-10-CM

## 2024-02-19 DIAGNOSIS — M25.551 RIGHT HIP PAIN: ICD-10-CM

## 2024-02-19 PROCEDURE — 99999 PR PBB SHADOW E&M-EST. PATIENT-LVL IV: CPT | Mod: PBBFAC,,, | Performed by: NURSE PRACTITIONER

## 2024-02-19 PROCEDURE — 96372 THER/PROPH/DIAG INJ SC/IM: CPT | Mod: ,,, | Performed by: NURSE PRACTITIONER

## 2024-02-19 PROCEDURE — 99214 OFFICE O/P EST MOD 30 MIN: CPT | Mod: S$PBB,25,, | Performed by: NURSE PRACTITIONER

## 2024-02-19 PROCEDURE — 99999PBSHW PR PBB SHADOW TECHNICAL ONLY FILED TO HB: Mod: PBBFAC,,,

## 2024-02-19 PROCEDURE — 99214 OFFICE O/P EST MOD 30 MIN: CPT | Mod: PBBFAC | Performed by: NURSE PRACTITIONER

## 2024-02-19 RX ORDER — NAPROXEN SODIUM 550 MG/1
550 TABLET ORAL 2 TIMES DAILY WITH MEALS
Qty: 60 TABLET | Refills: 0 | Status: SHIPPED | OUTPATIENT
Start: 2024-02-19 | End: 2024-03-20

## 2024-02-19 RX ORDER — OXYCODONE AND ACETAMINOPHEN 10; 325 MG/1; MG/1
1 TABLET ORAL EVERY 12 HOURS PRN
Qty: 60 TABLET | Refills: 0 | Status: SHIPPED | OUTPATIENT
Start: 2024-02-19 | End: 2024-03-14 | Stop reason: SDUPTHER

## 2024-02-19 RX ORDER — KETOROLAC TROMETHAMINE 30 MG/ML
30 INJECTION, SOLUTION INTRAMUSCULAR; INTRAVENOUS
Status: COMPLETED | OUTPATIENT
Start: 2024-02-19 | End: 2024-02-19

## 2024-02-19 RX ADMIN — KETOROLAC TROMETHAMINE 30 MG: 60 INJECTION, SOLUTION INTRAMUSCULAR at 11:02

## 2024-02-19 NOTE — PROGRESS NOTES
Chronic patient Established Note (Follow up visit)      SUBJECTIVE:    Interval History 2/19/2024:  The patient is here for follow up of right sided back and leg pain. She is s/p right L3/4 and L4/5 TF HERACLIO with no relief, not even short term. She actually feels like it worsened her pain. Leg pain is greater than back pain. She continues to have right sided back pain with radiation down the front of the right leg. She has associated numbness and weakness. She does have some symptoms on the left, but the right side is the worse. She did see Dr. Rodrigez last year who discussed surgery if epidural did not help. [place] She continues to take Percocet BID with mild benefit. She stopped Gabapentin due to minimal benefit. She is requesting Naproxen as she feels like it was helpful in the past. Her pain today is 10/10.    Interval History 1/16/2024:  Chantal Ridley presents to the clinic for a follow-up appointment for hip pain. She is s/p right hip joint injection on 12/18/2023. She reports relief for 2 weeks. Then, her pain returned to baseline. She continues to report low back pain that radiates into the anterolateral aspect of her right thigh to her knee. She denies any left leg pain. She does have right hip pain. Her pain is worse with standing and walking. She also reports pain with moving from sitting to standing. She is currently taking Percocet as needed with benefit. She denies any adverse effects. She denies any other health changes. Her pain today is 10/10.    HPI  A former patient of Dr. Garland who was on Percocet for chronic pain.  She was given 7.5 to use twice a day.  They had recommended interventions but she declined.  She is interested in interventions if they would help.  She has chronic right lower back pain that radiates to the right groin and thigh.  It also extends this down to the foot anterior and dorsally.  There is sometimes tingling but no  numbness.  She does not appreciate weakness.  She is stiff when she gets up and takes her a while to get moving.  There is no imaging of her hip but she thinks she had 1 at [facility].  She had therapy in the remote past and has not been following with the exercises.  She is interested in getting back into therapy.    Pain Disability Index Review:      1/16/2024     2:16 PM   Last 3 PDI Scores   Pain Disability Index (PDI) 32       Pain Medications:  Percocet    Opioid Contract: yes     report:  Reviewed and consistent with medication use as prescribed.    Pain Procedures:   12/18/2023- Right hip joint injection  2/5/24 Right L3/4 and L4/5 TF HERACLIO- no relief    Physical Therapy/Home Exercise: yes    Imaging:   Xray Hips 11/21/2023:  CLINICAL HISTORY:  Pain in right hip     FINDINGS:  Two views right hip: There is severe DJD and impingement change.  No fracture dislocation bone destruction seen.    MRI Lumbar Spine 6/21/2023:  COMPARISON:  X-ray 05/30/2023     FINDINGS:  Lumbar spine alignment is within normal limits. The vertebral body heights are well maintained, with no fracture.  Degenerative endplate sclerosis is seen at L2-3.  No marrow signal abnormality to suggest infiltrative process.     The conus is normal in appearance.  The adjacent soft tissue structures show no significant abnormalities.     L1-L2: Circumferential disc bulge and spondylitic spurring.No significant central canal or neural foraminal narrowing.     L2-L3: Circumferential disc bulge, spondylitic spurring, ligamentum flavum thickening and facet arthropathy.  Moderate central spinal stenosis and severe right-sided foraminal stenosis.     L3-L4: Circumferential disc bulge, spondylitic spurring and facet arthropathy.  Posteriorly projecting synovial cyst arising from the right facet.  Severe central spinal stenosis.  No foraminal stenosis.     L4-L5: Circumferential disc bulge, spondylitic spurring and severe facet arthropathy.  Moderate  central canal stenosis and no significant foraminal stenosis.     L5-S1: Circumferential disc bulge, spondylitic spurring and superimposed right paracentral and medial foraminal disc protrusion.  No central canal stenosis.  Moderate left-sided foraminal stenosis.     Impression:     Multilevel extradural degenerative change producing moderate to severe spinal stenosis and areas of foraminal stenosis, most pronounced at L3-4 centrally.    Allergies:   Review of patient's allergies indicates:   Allergen Reactions    Ace inhibitors Anaphylaxis, Itching and Swelling    Amlodipine Swelling    Amoxicillin Anaphylaxis    Erythromycin Swelling and Hives     Tongue swelling    Penicillins Anaphylaxis    Losartan Hives     Headaches. Patient is currently taking Losartan for high b/p and states they are feeling fine.    Tramadol Itching    Olmesartan Rash     Other reaction(s): Skin Rashes / Eruption of skin, Benicar       Current Medications:   Current Outpatient Medications   Medication Sig Dispense Refill    amitriptyline (ELAVIL) 10 MG tablet Take 1 tablet (10 mg total) by mouth nightly as needed for Insomnia. 30 tablet 2    diclofenac sodium (SOLARAZE) 3 % gel Apply topically 2 (two) times daily.      diclofenac-misoprostol  mg-mcg (ARTHROTEC 50)  mg-mcg per tablet Take 1 tablet by mouth 4 (four) times daily.      DULoxetine 60 mg CDRS Take 1 capsule by mouth once daily.      estrogen, conjugated,-medroxyprogesterone 0.625-2.5mg (PREMPRO) 0.625-2.5 mg per tablet Take 1 tablet by mouth once daily.      gabapentin (NEURONTIN) 300 MG capsule Take 300 mg by mouth daily as needed.      hydrOXYzine pamoate (VISTARIL) 25 MG Cap Take 25 mg by mouth daily as needed.      irbesartan (AVAPRO) 150 MG tablet Take 150 mg by mouth every evening.      irbesartan (AVAPRO) 300 MG tablet Take 300 mg by mouth.      LIDOcaine (LIDODERM) 5 % Place 1 patch onto the skin once daily. Remove & Discard patch within 12 hours or as  directed by MD 30 patch 6    LIDOcaine-prilocaine (EMLA) cream Apply topically as needed.      meloxicam (MOBIC) 15 MG tablet       naproxen (NAPROSYN) 500 MG tablet Take 500 mg by mouth 2 (two) times daily with meals.      phenyleph-min oil-petrolatum 0.25-14-74.9 % Oint Place 1 Application rectally.      phenylephrine-cocoa butter 0.25-88.44 % Supp suppository Place 1 suppository rectally.      sitagliptan-metformin (JANUMET)  mg per tablet Take 1 tablet by mouth 2 (two) times daily with meals.      tiZANidine (ZANAFLEX) 4 MG tablet Take 4 mg by mouth every 8 (eight) hours.      diazePAM (VALIUM) 5 MG tablet Take 1 tablet (5 mg total) by mouth once. Take medication 30 minutes prior to procedure  for 1 dose 1 tablet 0     No current facility-administered medications for this visit.     Facility-Administered Medications Ordered in Other Visits   Medication Dose Route Frequency Provider Last Rate Last Admin    0.9%  NaCl infusion   Intravenous Continuous Chris Farmer MD           REVIEW OF SYSTEMS:    GENERAL:  No weight loss, malaise or fevers.  HEENT:  Negative for frequent or significant headaches.  NECK:  Negative for lumps, goiter, pain and significant neck swelling.  RESPIRATORY:  Negative for cough, wheezing or shortness of breath.  CARDIOVASCULAR:  Negative for chest pain, leg swelling or palpitations. HTN  GI:  Negative for abdominal discomfort, blood in stools or black stools or change in bowel habits.  MUSCULOSKELETAL:  See HPI.  SKIN:  Negative for lesions, rash, and itching.  PSYCH:  Negative for sleep disturbance, mood disorder and recent psychosocial stressors.  ENDO: Diabetes  HEMATOLOGY/LYMPHOLOGY:  Negative for prolonged bleeding, bruising easily or swollen nodes.  NEURO:   No history of headaches, syncope, paralysis, seizures or tremors.  All other reviewed and negative other than HPI.    Past Medical History:  Past Medical History:   Diagnosis Date    Diabetes mellitus     Fibromyalgia   "   Hx of degenerative disc disease     neck & back    Hypertension        Past Surgical History:  Past Surgical History:   Procedure Laterality Date    CATARACT EXTRACTION W/  INTRAOCULAR LENS IMPLANT  2016    INJECTION OF JOINT Right 2023    Procedure: INJECTION, JOINT RIGHT HIP;  Surgeon: Ike Valdez MD;  Location: [facility] [place] [facility];  Service: Pain Management;  Laterality: Right;  [phone]    TRANSFORAMINAL EPIDURAL INJECTION OF STEROID Right 2024    Procedure: LUMBAR TRANSFORAMINAL RIGHT L3/4 AND L4/5;  Surgeon: Ike Valdez MD;  Location: [facility] [place] [facility];  Service: Pain Management;  Laterality: Right;  [phone]  2 WK F/U SIGIFREDO    TUBAL LIGATION         Family History:  History reviewed. No pertinent family history.    Social History:  Social History     Socioeconomic History    Marital status: Single   Tobacco Use    Smoking status: Former     Current packs/day: 0.00     Types: Cigarettes     Quit date:      Years since quittin.1    Smokeless tobacco: Never   Substance and Sexual Activity    Alcohol use: No    Drug use: No       OBJECTIVE:    BP (!) 178/79 (BP Location: Left arm, Patient Position: Sitting, BP Method: Medium (Automatic))   Pulse (!) 55   Ht 5' 1" (1.549 m)   Wt 66.2 kg (145 lb 15.1 oz)   BMI 27.58 kg/m²     PHYSICAL EXAMINATION:    General appearance: Well appearing, in no acute distress, alert and oriented x3.  Psych:  Mood and affect appropriate.  Skin: Skin color, texture, turgor normal, no rashes or lesions, in both upper and lower body.  Head/face:  Atraumatic, normocephalic. No palpable lymph nodes  Back: Straight leg raising in the sitting position is positive to radicular pain bilaterally. Limited ROM with pain on extension and flexion. Positive facet loading bilaterally, R>L.  Extremities: No deformities, edema, or skin discoloration. Good capillary refill.  Musculoskeletal: 4/5 strength in right ankle with plantar and dorsiflexion. 5/5 strength in left " ankle with plantar and dorsiflexion. 4/5 strength with right knee flexion and extension. 5/5 strength with left knee flexion and extension. 5/5 strength in right EHL, 5/5 strength in left EHL.  Neuro: Decreased sensation to RLE.  Gait: Antalgic- ambulates without assistance.     ASSESSMENT: 74 y.o. year old female with low back and hip pain, consistent with the followin. Lumbosacral radiculopathy        2. DDD (degenerative disc disease), lumbar        3. Chronic pain syndrome        4. Chronic pain disorder        5. Lumbar spondylosis        6. Encounter for long-term opiate analgesic use            PLAN: on (Discharged)  Endo (2), OBGYN, Pain Management, Pain Med   Lab (1)   Imaging (1)  CARE GAPS  Hepatitis C Screening  COVID-19 Vaccine (1)  TETANUS VACCINE  DEXA Scan  5 more care gaps  PROBLEM LIST (12)  SOCIAL DETERMINANTS            # Active LDAs: 4  Reviewed at 10:43 AM (You)        Level of Service  (CPT®)  MD OFFICE/OUTPT VISIT, EST, LEVL IV, 30-39 MIN [54395]  TYTO   Add Modifier    Add E/M Code  Authorizing provider:   VINAYAK LAIRD  Type of supervision:   Collaborating Physician  Supervising provider:   ABIGAIL HEARN  Calculate LOS based on time    Patient type:  NewEstablished  Total time (min.):  [id number]  Estimated time:   30 Minutes    Level of Service    - Previous imaging was reviewed and discussed with the patient today. Labs reviewed.     - She is s/p right L3/4 and L4/5 TF HERACLIO with no benefit. I will send her back to Dr. Rodrigez to discuss surgery. We can consider MBB/RFA for axial pain, but her leg pain is worse than leg pain.     - Pain contract signed 2023.     - Continue Percocet 10/325 mg every 12 hours PRN, #60, refill provided.     - Will send Naproxen 550 mg BID PRN per her request. Recommend lowest dose for shortest duration possible. Pt denies hx of GI ulcers or bleeds, no blood thinners, so significant known cardiac disease, no kidney disease.     - The patient  is here today for a refill of current pain medications and they believe these provide effective pain control and improvements in quality of life.  The patient notes no serious side effects, and feels the benefits outweigh the risks.  The patient was reminded of the pain contract that they signed previously as well as the risks and benefits of the medication including possible death.  The updated [place] Board [employer] prescription monitoring program was reviewed, and the patient has been found to be compliant with current treatment plan. Medication management provided by Dr. Valdez.     - UDS from 11/21/2023 reviewed and consistent.     - I have stressed the importance of physical activity and a home exercise plan to help with pain and improve health.    - RTC in 2 months or sooner if needed.    - Counseled patient regarding the importance of activity modification.    - Dr. Valdez was consulted on the patient and agrees with this plan.      The above plan and management options were discussed at length with patient. Patient is in agreement with the above and verbalized understanding.    Alee Liu  02/19/2024

## 2024-02-19 NOTE — TELEPHONE ENCOUNTER
Patient requesting refill on oxycodone  Last office visit 2-19-24   shows last refill on 1-19-24  Patient does have a pain contract on file with [facility] department  Patient last UDS 11-21-23 was consistent with current therapy    CODEINE  Not Detected   Comment: INTERPRETIVE INFORMATION: Codeine, U  Positive Cutoff: 40 ng/mL  Methodology: Mass Spectrometry   MORPHINE  Not Detected   Comment: INTERPRETIVE INFORMATION:Morphine, U  Positive Cutoff: 20 ng/mL  Methodology: Mass Spectrometry   6-ACETYLMORPHINE  Not Detected   Comment: INTERPRETIVE INFORMATION:6-acetylmorphine, U  Positive Cutoff: 20 ng/mL  Methodology: Mass Spectrometry   OXYCODONE  Present   Comment: INTERPRETIVE INFORMATION:Oxycodone, U  Positive Cutoff: 40 ng/mL  Methodology: Mass Spectrometry   NOROYXCODONE  Present   Comment: INTERPRETIVE INFORMATION:Noroxycodone, U  Positive Cutoff: 100 ng/mL  Methodology: Mass Spectrometry   OXYMORPHONE  Present   Comment: INTERPRETIVE INFORMATION:Oxymorphone, U  Positive Cutoff: 40 ng/mL  Methodology: Mass Spectrometry   NOROXYMORPHONE  Present   Comment: INTERPRETIVE INFORMATION:Noroxymorphone, U  Positive Cutoff: 100 ng/mL  Methodology: Mass Spectrometry   HYDROCODONE  Not Detected   Comment: INTERPRETIVE INFORMATION:Hydrocodone, U  Positive Cutoff: 40 ng/mL  Methodology: Mass Spectrometry   NORHYDROCODONE  Not Detected   Comment: INTERPRETIVE INFORMATION:Norhydrocodone, U  Positive Cutoff: 100 ng/mL  Methodology: Mass Spectrometry   HYDROMORPHONE  Not Detected   Comment: INTERPRETIVE INFORMATION:Hydromorphone, U  Positive Cutoff: 20 ng/mL  Methodology: Mass Spectrometry   BUPRENORPHINE  Not Detected   Comment: INTERPRETIVE INFORMATION:Buprenorphine, U  Positive Cutoff: 5 ng/mL  Methodology: Mass Spectrometry   NORUBPRENORPHINE  Not Detected   Comment: INTERPRETIVE INFORMATION:Norbuprenorphine, U  Positive Cutoff: 20 ng/mL  Methodology: Mass Spectrometry   FENTANYL  Not Detected    Comment: INTERPRETIVE INFORMATION:Fentanyl, U  Positive Cutoff: 2 ng/mL  Methodology: Mass Spectrometry   NORFENTANYL  Not Detected   Comment: INTERPRETIVE INFORMATION:Norfentanyl, U  Positive Cutoff: 2 ng/mL  Methodology: Mass Spectrometry   MEPERIDINE METABOLITE  Not Detected   Comment: INTERPRETIVE INFORMATION:Meperidine metabolite, U  Positive Cutoff: 50 ng/mL  Methodology: Mass Spectrometry   TAPENTADOL  Not Detected   Comment: INTERPRETIVE INFORMATION:Tapentadol, U  Positive Cutoff: 100 ng/mL  Methodology: Mass Spectrometry   TAPENTADOL-O-SULF  Not Detected   Comment: INTERPRETIVE INFORMATION:Tapentadol-o-Sulf, U  Positive Cutoff: 200 ng/mL  Methodology: Mass Spectrometry   METHADONE  Negative   Comment: Presumptive negative by immunoassay. Testing by mass  spectrometry is available on request.  INTERPRETIVE INFORMATION: Methadone Screen, U  Positive Cutoff: 150 ng/mL  Methodology: Immunoassay   TRAMADOL  Negative   Comment: Presumptive negative by immunoassay. Testing by mass  spectrometry is available on request.  INTERPRETIVE INFORMATION:Tramadol Screen, U  Positive Cutoff: 100 ng/mL  Methodology: Immunoassay   AMPHETAMINE  Not Detected   Comment: INTERPRETIVE INFORMATION:Amphetamine, U  Positive Cutoff: 50 ng/mL  Methodology: Mass Spectrometry   METHAMPHETAMINE  Not Detected   Comment: INTERPRETIVE INFORMATION:Methamphetamine, U  Positive Cutoff: 200 ng/mL  Methodology: Mass Spectrometry   MDMA- ECSTASY  Not Detected   Comment: INTERPRETIVE INFORMATION:MDMA, U  Positive Cutoff: 200 ng/mL  Methodology: Mass Spectrometry   MDA  Not Detected   Comment: INTERPRETIVE INFORMATION:MDA, U  Positive Cutoff: 200 ng/mL  Methodology: Mass Spectrometry   MDEA- Annetta  Not Detected   Comment: INTERPRETIVE INFORMATION:MDEA, U  Positive Cutoff: 200 ng/mL  Methodology: Mass Spectrometry   METHYLPHENIDATE  Not Detected   Comment: INTERPRETIVE INFORMATION:Methylphenidate, U  Positive Cutoff: 100 ng/mL  Methodology: Mass  Spectrometry   PHENTERMINE  Not Detected   Comment: INTERPRETIVE INFORMATION:Phentermine, U  Positive Cutoff: 100 ng/mL  Methodology: Mass Spectrometry   BENZOYLECGONINE  Negative   Comment: Presumptive negative by immunoassay. Testing by mass  spectrometry is available on request.  INTERPRETIVE INFORMATION:Cocaine Screen, U  Positive Cutoff: 150 ng/mL  Methodology: Immunoassay   ALPRAZOLAM  Not Detected   Comment: INTERPRETIVE INFORMATION:Alprazolam, U  Positive Cutoff: 40 ng/mL  Methodology: Mass Spectrometry   ALPHA-OH-ALPRAZOLAM  Not Detected   Comment: INTERPRETIVE INFORMATION:Alpha-OH-Alprazolam, U  Positive Cutoff: 20 ng/mL  Methodology: Mass Spectrometry   CLONAZEPAM  Not Detected   Comment: INTERPRETIVE INFORMATION:Clonazepam, U  Positive Cutoff: 20 ng/mL  Methodology: Mass Spectrometry   7-AMINOCLONAZEPAM  Not Detected   Comment: INTERPRETIVE INFORMATION:7-Aminoclonazepam, U  Positive Cutoff: 40 ng/mL  Methodology: Mass Spectrometry   DIAZEPAM  Not Detected   Comment: INTERPRETIVE INFORMATION:Diazepam, U  Positive Cutoff: 50 ng/mL  Methodology: Mass Spectrometry   NORDIAZEPAM  Not Detected   Comment: INTERPRETIVE INFORMATION:Nordiazepam, U  Positive Cutoff: 50 ng/mL  Methodology: Mass Spectrometry   OXAZEPAM  Not Detected   Comment: INTERPRETIVE INFORMATION:Oxazepam, U  Positive Cutoff: 50 ng/mL  Methodology: Mass Spectrometry   TEMAZEPAM  Not Detected   Comment: INTERPRETIVE INFORMATION:Temazepam, U  Positive Cutoff: 50 ng/mL  Methodology: Mass Spectrometry   Lorazepam  Not Detected   Comment: INTERPRETIVE INFORMATION:Lorazepam, U  Positive Cutoff: 60 ng/mL  Methodology: Mass Spectrometry   MIDAZOLAM  Not Detected   Comment: INTERPRETIVE INFORMATION:Midazolam, U  Positive Cutoff: 20 ng/mL  Methodology: Mass Spectrometry   ZOLPIDEM  Not Detected   Comment: INTERPRETIVE INFORMATION:Zolpidem, U  Positive Cutoff: 20 ng/mL  Methodology: Mass Spectrometry   BARBITURATES  Negative   Comment: Presumptive negative  by immunoassay. Testing by mass  spectrometry is available on request.  INTERPRETIVE INFORMATION:Barbiturates Screen, U  Positive Cutoff: 200 ng/mL  Methodology: Immunoassay   Creatinine, Urine 20.0 - 400.0 mg/dL 138.5   ETHYL GLUCURONIDE  Negative   Comment: Presumptive negative by immunoassay. Testing by mass  spectrometry is available on request.  INTERPRETIVE INFORMATION:Ethyl Glucuronide Screen, U  Positive Cutoff: 500 ng/mL  Methodology: Immunoassay   MARIJUANA METABOLITE  Negative   Comment: Presumptive negative by immunoassay. Testing by mass  spectrometry is available on request.  INTERPRETIVE INFORMATION: THC (Cannabinoids) Screen, U  Positive Cutoff: 50 ng/mL  Methodology: Immunoassay   PCP  Negative   Comment: Presumptive negative by immunoassay. Testing by mass  spectrometry is available on request.  INTERPRETIVE INFORMATION:Phencyclidine Screen, U  Positive Cutoff: 25 ng/mL  Methodology: Immunoassay   CARISOPRODOL  Negative   Comment: Presumptive negative by immunoassay. Testing by mass  spectrometry is available on request.  INTERPRETIVE INFORMATION: Carisoprodol Screen, U  Positive Cutoff: 100 ng/mL  Methodology: Immunoassay  The carisoprodol immunoassay has cross-reactivity to  carisoprodol and meprobamate.   Naloxone  Not Detected   Comment: INTERPRETIVE INFORMATION:Naloxone, U  Positive Cutoff: 100 ng/mL  Methodology: Mass Spectrometry   Gabapentin  Present   Comment: INTERPRETIVE INFORMATION:Gabapentin, U  Positive Cutoff: 3,000 ng/mL  Methodology: Mass Spectrometry   Pregabalin  Not Detected   Comment: INTERPRETIVE INFORMATION:Pregabalin, U  Positive Cutoff: 3,000 ng/mL  Methodology: Mass Spectrometry   Alpha-OH-Midazolam  Not Detected   Comment: INTERPRETIVE INFORMATION:Alpha-OH-Midazolam, U  Positive Cutoff: 20 ng/mL  Methodology: Mass Spectrometry   Zolpidem Metabolite  Not Detected   Comment: INTERPRETIVE INFORMATION:Zolpidem Metabolite, U  Positive Cutoff: 100 ng/mL  Methodology: Mass  Spectrometry

## 2024-03-05 ENCOUNTER — OFFICE VISIT (OUTPATIENT)
Dept: ORTHOPEDICS | Facility: CLINIC | Age: 74
End: 2024-03-05
Payer: MEDICARE

## 2024-03-05 DIAGNOSIS — M54.16 LUMBAR RADICULOPATHY: ICD-10-CM

## 2024-03-05 DIAGNOSIS — M47.816 LUMBAR SPONDYLOSIS: Primary | ICD-10-CM

## 2024-03-05 DIAGNOSIS — M51.36 DDD (DEGENERATIVE DISC DISEASE), LUMBAR: ICD-10-CM

## 2024-03-05 PROCEDURE — 99213 OFFICE O/P EST LOW 20 MIN: CPT | Mod: PBBFAC | Performed by: ORTHOPAEDIC SURGERY

## 2024-03-05 PROCEDURE — 99214 OFFICE O/P EST MOD 30 MIN: CPT | Mod: S$PBB,,, | Performed by: ORTHOPAEDIC SURGERY

## 2024-03-05 PROCEDURE — 99999 PR PBB SHADOW E&M-EST. PATIENT-LVL III: CPT | Mod: PBBFAC,,, | Performed by: ORTHOPAEDIC SURGERY

## 2024-03-05 NOTE — PROGRESS NOTES
DATE: 3/5/2024  PATIENT: Chantal Ridley    Attending Physician: Jeronimo Rodrigez M.D.    HISTORY:  Chantal Ridley is a 74 y.o. female who returns to me today for FU. Patient was last seen on 5/30/23. Patient continues to have LBP that radiates anterolaterally down RLE to the knee. Patient has been taking meds and doing exercises without much relief. She would like to continue conservative management.    12/18/23: R hip injection - helped a lot for a few days  2/5/24: R L3-5 TFESI - did not help at all    The patient does not smoke or endorse IVDU. She has DM (HA1C of 6.3). The patient is not on any blood thinners. She is retired; she used to be a  for mortgages.    PMH/PSH/FamHx/SocHx:  Unchanged from prior visit    ROS:  Positive for LBP and RLE pain  Denies perineal paresthesias, bowel or bladder incontinence    EXAM:  There were no vitals taken for this visit.    My physical examination was notable for the following findings: Normal strength and tone in all major motor groups in the bilateral upper and lower extremities except for 4/5 in R HF and KE. Normal sensation to light touch in the C5-T1 and L2-S1 dermatomes bilaterally.    IMAGING:  Today I independently reviewed the following images and my interpretations are as follows:    Previous L-spine XRs showed spondylosis and DDD. L2-3 significant DDD. L4-5 subtle anterolisthesis measuring 4mm.    MRI cervical and thoracic in 2023 showed no significant central or foraminal stenosis.    MRI lumbar from 2023 showed L2-3 mod central and b/l (R>L) foraminal stenosis. L3-4 severe central stenosis. L4-5 mod central stenosis.    Hip Xrs showed severe R hip OA.    Scoliosis Xrs showed no sagittal plane imbalance.    ASSESSMENT/PLAN:  Patient has lumbar spondylosis and stenosis with RLE radiculopathy. I discussed the natural history of their diagnoses as well as surgical and nonsurgical treatment options. I educated the patient on the importance of  core/back strengthening, correct posture, bending/lifting ergonomics, and low-impact aerobic exercises (walking, elliptical, and aquatherapy). Continue medications and exercises. I referred pt to [facility] for R hip and to [facility]. Patient will follow up PRN. She is a candidate for L2-4 PLDF/TLIF (R) if she fails conservative management.    Jeronimo Rodrigez MD  Orthopaedic Spine Surgeon  Department of Orthopaedic Surgery  [phone]

## 2024-03-12 ENCOUNTER — OFFICE VISIT (OUTPATIENT)
Dept: PODIATRY | Facility: CLINIC | Age: 74
End: 2024-03-12
Payer: MEDICARE

## 2024-03-12 VITALS — HEIGHT: 61 IN | WEIGHT: 145.94 LBS | BODY MASS INDEX: 27.55 KG/M2

## 2024-03-12 DIAGNOSIS — M19.071 PRIMARY OSTEOARTHRITIS OF RIGHT FOOT: ICD-10-CM

## 2024-03-12 DIAGNOSIS — E08.00 DIABETES MELLITUS DUE TO UNDERLYING CONDITION WITH HYPEROSMOLARITY WITHOUT COMA, WITHOUT LONG-TERM CURRENT USE OF INSULIN: ICD-10-CM

## 2024-03-12 DIAGNOSIS — M79.672 PAIN IN BOTH FEET: ICD-10-CM

## 2024-03-12 DIAGNOSIS — M54.17 LUMBOSACRAL RADICULOPATHY: Primary | ICD-10-CM

## 2024-03-12 DIAGNOSIS — M79.671 PAIN IN BOTH FEET: ICD-10-CM

## 2024-03-12 DIAGNOSIS — L84 CORN OR CALLUS: ICD-10-CM

## 2024-03-12 DIAGNOSIS — G57.81 NERVE ENTRAPMENT OF LOWER LIMB, RIGHT: ICD-10-CM

## 2024-03-12 PROCEDURE — 20605 DRAIN/INJ JOINT/BURSA W/O US: CPT | Mod: 50,S$PBB,, | Performed by: PODIATRIST

## 2024-03-12 PROCEDURE — 99213 OFFICE O/P EST LOW 20 MIN: CPT | Mod: PBBFAC,25 | Performed by: PODIATRIST

## 2024-03-12 PROCEDURE — 20605 DRAIN/INJ JOINT/BURSA W/O US: CPT | Mod: 50,PBBFAC | Performed by: PODIATRIST

## 2024-03-12 PROCEDURE — 11056 PARNG/CUTG B9 HYPRKR LES 2-4: CPT | Mod: 59,Q9,PBBFAC | Performed by: PODIATRIST

## 2024-03-12 PROCEDURE — 11056 PARNG/CUTG B9 HYPRKR LES 2-4: CPT | Mod: 59,S$PBB,Q9, | Performed by: PODIATRIST

## 2024-03-12 PROCEDURE — 99999 PR PBB SHADOW E&M-EST. PATIENT-LVL III: CPT | Mod: PBBFAC,,, | Performed by: PODIATRIST

## 2024-03-12 PROCEDURE — 99214 OFFICE O/P EST MOD 30 MIN: CPT | Mod: 25,S$PBB,, | Performed by: PODIATRIST

## 2024-03-12 PROCEDURE — 11721 DEBRIDE NAIL 6 OR MORE: CPT | Mod: 59,S$PBB,Q9, | Performed by: PODIATRIST

## 2024-03-12 PROCEDURE — 11721 DEBRIDE NAIL 6 OR MORE: CPT | Mod: Q9,PBBFAC | Performed by: PODIATRIST

## 2024-03-12 RX ORDER — TRIAMCINOLONE ACETONIDE 40 MG/ML
40 INJECTION, SUSPENSION INTRA-ARTICULAR; INTRAMUSCULAR
Status: COMPLETED | OUTPATIENT
Start: 2024-03-12 | End: 2024-04-15

## 2024-03-12 RX ORDER — LIDOCAINE 50 MG/G
1 PATCH TOPICAL DAILY
Qty: 30 PATCH | Refills: 6 | Status: SHIPPED | OUTPATIENT
Start: 2024-03-12

## 2024-03-12 RX ORDER — FLUCONAZOLE 150 MG/1
TABLET ORAL
COMMUNITY
Start: 2024-01-29

## 2024-03-14 ENCOUNTER — TELEPHONE (OUTPATIENT)
Dept: PODIATRY | Facility: CLINIC | Age: 74
End: 2024-03-14
Payer: MEDICARE

## 2024-03-14 DIAGNOSIS — M47.26 OSTEOARTHRITIS OF SPINE WITH RADICULOPATHY, LUMBAR REGION: ICD-10-CM

## 2024-03-14 DIAGNOSIS — M25.551 RIGHT HIP PAIN: ICD-10-CM

## 2024-03-14 DIAGNOSIS — M54.17 LUMBOSACRAL RADICULOPATHY: ICD-10-CM

## 2024-03-14 DIAGNOSIS — M71.38 CYST OF LUMBAR FACET JOINT: ICD-10-CM

## 2024-03-14 RX ORDER — OXYCODONE AND ACETAMINOPHEN 10; 325 MG/1; MG/1
1 TABLET ORAL EVERY 12 HOURS PRN
Qty: 60 TABLET | Refills: 0 | Status: SHIPPED | OUTPATIENT
Start: 2024-03-14 | End: 2024-04-12 | Stop reason: SDUPTHER

## 2024-03-14 NOTE — TELEPHONE ENCOUNTER
Elisabet Salcedo Staff  Caller: [phone] (Today,  2:57 PM)  Chantal Ridley calling regarding Patient Advice (message) for * pt is calling to have nurse fax over prescription to [facility] for her Diabetic shoes    FAX: [phone]      Rx route to [facility]

## 2024-03-14 NOTE — TELEPHONE ENCOUNTER
----- Message from Nikki Franklin sent at 3/14/2024  4:20 PM CDT -----      Can the clinic reply in [facility]:Y        Please refill the medication(s) listed below. Please call the patient when the prescription(s) is ready for  at this phone number         Medication #1 oxyCODONE-acetaminophen (PERCOCET)  mg per tablet    Medication #2       Preferred Pharmacy: [facility]

## 2024-03-14 NOTE — TELEPHONE ENCOUNTER
Patient requesting refill on oxycodone  Last office visit 2-19-24   shows last refill on 2-19-24  Patient does have a pain contract on file with [facility]  Patient last UDS 11-21-23 was consistent with current therapy     CODEINE   Not Detected   Comment: INTERPRETIVE INFORMATION: Codeine, U  Positive Cutoff: 40 ng/mL  Methodology: Mass Spectrometry   MORPHINE   Not Detected   Comment: INTERPRETIVE INFORMATION:Morphine, U  Positive Cutoff: 20 ng/mL  Methodology: Mass Spectrometry   6-ACETYLMORPHINE   Not Detected   Comment: INTERPRETIVE INFORMATION:6-acetylmorphine, U  Positive Cutoff: 20 ng/mL  Methodology: Mass Spectrometry   OXYCODONE   Present   Comment: INTERPRETIVE INFORMATION:Oxycodone, U  Positive Cutoff: 40 ng/mL  Methodology: Mass Spectrometry   NOROYXCODONE   Present   Comment: INTERPRETIVE INFORMATION:Noroxycodone, U  Positive Cutoff: 100 ng/mL  Methodology: Mass Spectrometry   OXYMORPHONE   Present   Comment: INTERPRETIVE INFORMATION:Oxymorphone, U  Positive Cutoff: 40 ng/mL  Methodology: Mass Spectrometry   NOROXYMORPHONE   Present   Comment: INTERPRETIVE INFORMATION:Noroxymorphone, U  Positive Cutoff: 100 ng/mL  Methodology: Mass Spectrometry   HYDROCODONE   Not Detected   Comment: INTERPRETIVE INFORMATION:Hydrocodone, U  Positive Cutoff: 40 ng/mL  Methodology: Mass Spectrometry   NORHYDROCODONE   Not Detected   Comment: INTERPRETIVE INFORMATION:Norhydrocodone, U  Positive Cutoff: 100 ng/mL  Methodology: Mass Spectrometry   HYDROMORPHONE   Not Detected   Comment: INTERPRETIVE INFORMATION:Hydromorphone, U  Positive Cutoff: 20 ng/mL  Methodology: Mass Spectrometry   BUPRENORPHINE   Not Detected   Comment: INTERPRETIVE INFORMATION:Buprenorphine, U  Positive Cutoff: 5 ng/mL  Methodology: Mass Spectrometry   NORUBPRENORPHINE   Not Detected   Comment: INTERPRETIVE INFORMATION:Norbuprenorphine, U  Positive Cutoff: 20 ng/mL  Methodology: Mass Spectrometry   FENTANYL   Not  Detected   Comment: INTERPRETIVE INFORMATION:Fentanyl, U  Positive Cutoff: 2 ng/mL  Methodology: Mass Spectrometry   NORFENTANYL   Not Detected   Comment: INTERPRETIVE INFORMATION:Norfentanyl, U  Positive Cutoff: 2 ng/mL  Methodology: Mass Spectrometry   MEPERIDINE METABOLITE   Not Detected   Comment: INTERPRETIVE INFORMATION:Meperidine metabolite, U  Positive Cutoff: 50 ng/mL  Methodology: Mass Spectrometry   TAPENTADOL   Not Detected   Comment: INTERPRETIVE INFORMATION:Tapentadol, U  Positive Cutoff: 100 ng/mL  Methodology: Mass Spectrometry   TAPENTADOL-O-SULF   Not Detected   Comment: INTERPRETIVE INFORMATION:Tapentadol-o-Sulf, U  Positive Cutoff: 200 ng/mL  Methodology: Mass Spectrometry   METHADONE   Negative   Comment: Presumptive negative by immunoassay. Testing by mass  spectrometry is available on request.  INTERPRETIVE INFORMATION: Methadone Screen, U  Positive Cutoff: 150 ng/mL  Methodology: Immunoassay   TRAMADOL   Negative   Comment: Presumptive negative by immunoassay. Testing by mass  spectrometry is available on request.  INTERPRETIVE INFORMATION:Tramadol Screen, U  Positive Cutoff: 100 ng/mL  Methodology: Immunoassay   AMPHETAMINE   Not Detected   Comment: INTERPRETIVE INFORMATION:Amphetamine, U  Positive Cutoff: 50 ng/mL  Methodology: Mass Spectrometry   METHAMPHETAMINE   Not Detected   Comment: INTERPRETIVE INFORMATION:Methamphetamine, U  Positive Cutoff: 200 ng/mL  Methodology: Mass Spectrometry   MDMA- ECSTASY   Not Detected   Comment: INTERPRETIVE INFORMATION:MDMA, U  Positive Cutoff: 200 ng/mL  Methodology: Mass Spectrometry   MDA   Not Detected   Comment: INTERPRETIVE INFORMATION:MDA, U  Positive Cutoff: 200 ng/mL  Methodology: Mass Spectrometry   MDEA- Annetta   Not Detected   Comment: INTERPRETIVE INFORMATION:MDEA, U  Positive Cutoff: 200 ng/mL  Methodology: Mass Spectrometry   METHYLPHENIDATE   Not Detected   Comment: INTERPRETIVE INFORMATION:Methylphenidate, U  Positive Cutoff: 100  ng/mL  Methodology: Mass Spectrometry   PHENTERMINE   Not Detected   Comment: INTERPRETIVE INFORMATION:Phentermine, U  Positive Cutoff: 100 ng/mL  Methodology: Mass Spectrometry   BENZOYLECGONINE   Negative   Comment: Presumptive negative by immunoassay. Testing by mass  spectrometry is available on request.  INTERPRETIVE INFORMATION:Cocaine Screen, U  Positive Cutoff: 150 ng/mL  Methodology: Immunoassay   ALPRAZOLAM   Not Detected   Comment: INTERPRETIVE INFORMATION:Alprazolam, U  Positive Cutoff: 40 ng/mL  Methodology: Mass Spectrometry   ALPHA-OH-ALPRAZOLAM   Not Detected   Comment: INTERPRETIVE INFORMATION:Alpha-OH-Alprazolam, U  Positive Cutoff: 20 ng/mL  Methodology: Mass Spectrometry   CLONAZEPAM   Not Detected   Comment: INTERPRETIVE INFORMATION:Clonazepam, U  Positive Cutoff: 20 ng/mL  Methodology: Mass Spectrometry   7-AMINOCLONAZEPAM   Not Detected   Comment: INTERPRETIVE INFORMATION:7-Aminoclonazepam, U  Positive Cutoff: 40 ng/mL  Methodology: Mass Spectrometry   DIAZEPAM   Not Detected   Comment: INTERPRETIVE INFORMATION:Diazepam, U  Positive Cutoff: 50 ng/mL  Methodology: Mass Spectrometry   NORDIAZEPAM   Not Detected   Comment: INTERPRETIVE INFORMATION:Nordiazepam, U  Positive Cutoff: 50 ng/mL  Methodology: Mass Spectrometry   OXAZEPAM   Not Detected   Comment: INTERPRETIVE INFORMATION:Oxazepam, U  Positive Cutoff: 50 ng/mL  Methodology: Mass Spectrometry   TEMAZEPAM   Not Detected   Comment: INTERPRETIVE INFORMATION:Temazepam, U  Positive Cutoff: 50 ng/mL  Methodology: Mass Spectrometry   Lorazepam   Not Detected   Comment: INTERPRETIVE INFORMATION:Lorazepam, U  Positive Cutoff: 60 ng/mL  Methodology: Mass Spectrometry   MIDAZOLAM   Not Detected   Comment: INTERPRETIVE INFORMATION:Midazolam, U  Positive Cutoff: 20 ng/mL  Methodology: Mass Spectrometry   ZOLPIDEM   Not Detected   Comment: INTERPRETIVE INFORMATION:Zolpidem, U  Positive Cutoff: 20 ng/mL  Methodology: Mass Spectrometry   BARBITURATES    Negative   Comment: Presumptive negative by immunoassay. Testing by mass  spectrometry is available on request.  INTERPRETIVE INFORMATION:Barbiturates Screen, U  Positive Cutoff: 200 ng/mL  Methodology: Immunoassay   Creatinine, Urine 20.0 - 400.0 mg/dL 138.5   ETHYL GLUCURONIDE   Negative   Comment: Presumptive negative by immunoassay. Testing by mass  spectrometry is available on request.  INTERPRETIVE INFORMATION:Ethyl Glucuronide Screen, U  Positive Cutoff: 500 ng/mL  Methodology: Immunoassay   MARIJUANA METABOLITE   Negative   Comment: Presumptive negative by immunoassay. Testing by mass  spectrometry is available on request.  INTERPRETIVE INFORMATION: THC (Cannabinoids) Screen, U  Positive Cutoff: 50 ng/mL  Methodology: Immunoassay   PCP   Negative   Comment: Presumptive negative by immunoassay. Testing by mass  spectrometry is available on request.  INTERPRETIVE INFORMATION:Phencyclidine Screen, U  Positive Cutoff: 25 ng/mL  Methodology: Immunoassay   CARISOPRODOL   Negative   Comment: Presumptive negative by immunoassay. Testing by mass  spectrometry is available on request.  INTERPRETIVE INFORMATION: Carisoprodol Screen, U  Positive Cutoff: 100 ng/mL  Methodology: Immunoassay  The carisoprodol immunoassay has cross-reactivity to  carisoprodol and meprobamate.   Naloxone   Not Detected   Comment: INTERPRETIVE INFORMATION:Naloxone, U  Positive Cutoff: 100 ng/mL  Methodology: Mass Spectrometry   Gabapentin   Present   Comment: INTERPRETIVE INFORMATION:Gabapentin, U  Positive Cutoff: 3,000 ng/mL  Methodology: Mass Spectrometry   Pregabalin   Not Detected   Comment: INTERPRETIVE INFORMATION:Pregabalin, U  Positive Cutoff: 3,000 ng/mL  Methodology: Mass Spectrometry   Alpha-OH-Midazolam   Not Detected   Comment: INTERPRETIVE INFORMATION:Alpha-OH-Midazolam, U  Positive Cutoff: 20 ng/mL  Methodology: Mass Spectrometry   Zolpidem Metabolite   Not Detected   Comment: INTERPRETIVE INFORMATION:Zolpidem Metabolite,  U  Positive Cutoff: 100 ng/mL  Methodology: Mass Spectrometry

## 2024-03-16 NOTE — PROGRESS NOTES
Subjective:      Patient ID: Chantal Ridley is a 74 y.o. female.    Chief Complaint:   Nail Care and Diabetes Mellitus (PCP-[facility])    Chantal is a 74 y.o. female who presents to the clinic upon referral from Dr. Veronica parham. provider found  for evaluation and treatment of diabetic feet. Chantal has a past medical history of Diabetes mellitus, Fibromyalgia, degenerative disc disease, and Hypertension. Patient relates no major problem with feet.  Injections have been helping for nerve pain bilateral.  She is here for routine diabetic foot evaluation as well as increased bilateral foot joint pain.  PCP: [facility]    Date Last Seen by PCP:   Chief Complaint   Patient presents with    Nail Care    Diabetes Mellitus     [facility]         Current shoe gear: Casual shoes    Hemoglobin A1C   Date Value Ref Range Status   09/20/2022 6.3 (H) 4.5 - 5.7 % Final   02/14/2012 9.8 (H) 4.0 - 6.2 % Final         Review of Systems   Constitutional: Negative for chills, decreased appetite, fever and malaise/fatigue.   HENT:  Negative for congestion, hearing loss, nosebleeds and tinnitus.    Eyes:  Negative for double vision, pain, photophobia and visual disturbance.   Cardiovascular:  Negative for chest pain, claudication, cyanosis and leg swelling.   Respiratory:  Negative for cough, hemoptysis, shortness of breath and wheezing.    Endocrine: Negative for cold intolerance and heat intolerance.   Hematologic/Lymphatic: Negative for adenopathy and bleeding problem.   Skin:  Negative for color change, dry skin, itching, nail changes and suspicious lesions.   Musculoskeletal:  Positive for joint pain and stiffness. Negative for arthritis and myalgias.   Gastrointestinal:  Negative for abdominal pain, jaundice, nausea and vomiting.   Genitourinary:  Negative for dysuria, frequency and hematuria.   Neurological:  Positive for numbness, paresthesias and sensory  change. Negative for difficulty with concentration and loss of balance.   Psychiatric/Behavioral:  Negative for altered mental status, hallucinations and suicidal ideas. The patient is not nervous/anxious.    Allergic/Immunologic: Negative for environmental allergies and persistent infections.           Objective:      Physical Exam  Vitals reviewed.   Constitutional:       Appearance: She is well-developed.   HENT:      Head: Normocephalic and atraumatic.   Cardiovascular:      Pulses:           Dorsalis pedis pulses are 2+ on the right side and 2+ on the left side.        Posterior tibial pulses are 2+ on the right side and 2+ on the left side.   Pulmonary:      Effort: Pulmonary effort is normal.   Musculoskeletal:         General: Normal range of motion.      Comments: Inspection and palpation of the muscles joints and bones of both lower extremities reveal that muscle strength for the anterior lateral and posterior muscle groups and intrinsic muscle groups of the foot are all 5 over 5 symmetrical.     Tenderness to the right and left 1st metatarsal cuneiform joint.   Skin:     General: Skin is warm and dry.      Capillary Refill: Capillary refill takes 2 to 3 seconds.      Comments: Skin turgor is normal bilaterally.  Skin texture is well hydrated to both lower extremities.  No lesions or rashes or wounds appreciated bilaterally.  Nail plates 1 through 5 bilaterally are within normal limits for length and thickness.  No nail clubbing or incurvation noted.   Neurological:      Mental Status: She is alert and oriented to person, place, and time.      Comments: Sharp dull light touch vibratory proprioceptive sensation are intact bilaterally.  Deep tendon reflexes to patellar and Achilles tendon are symmetrical 2 over 4 bilaterally.  No ankle clonus or Babinski reflexes noted bilaterally.  Coordination is normal to both feet and lower extremities.  Positive Tinel sign/provocation sign right tarsal tunnel.    Psychiatric:         Behavior: Behavior normal.               Assessment:       Encounter Diagnoses   Name Primary?    Lumbosacral radiculopathy Yes    Nerve entrapment of lower limb, right     Diabetes mellitus due to underlying condition with hyperosmolarity without coma, without long-term current use of insulin     Corn or callus     Pain in both feet     Primary osteoarthritis of right foot      Independent visualization of imaging was performed.  Results were reviewed in detail with patient.       Plan:       Chantal was seen today for nail care and diabetes mellitus.    Diagnoses and all orders for this visit:    Lumbosacral radiculopathy  -     Ambulatory referral/consult to Pain Clinic; Future    Nerve entrapment of lower limb, right  -     Ambulatory referral/consult to Pain Clinic; Future    Diabetes mellitus due to underlying condition with hyperosmolarity without coma, without long-term current use of insulin  -     DIABETIC SHOES FOR HOME USE    Corn or callus  -     DIABETIC SHOES FOR HOME USE    Pain in both feet  -     DIABETIC SHOES FOR HOME USE    Primary osteoarthritis of right foot    Other orders  -     LIDOcaine (LIDODERM) 5 %; Place 1 patch onto the skin once daily. Remove & Discard patch within 12 hours or as directed by MD  -     triamcinolone acetonide injection 40 mg      I counseled the patient on her conditions, their implications and medical management.    The nature of the condition, options for management, as well as potential risks and complications were discussed in detail with patient. Patient was amenable to my recommendations and left my office fully informed and will follow up as instructed or sooner if necessary.      Decision making:  Chronic illnesses discussed in detail, previous records/notes and imaging independently reviewed, prescription drug management performed in addition to lengthy discussion regarding both conservative and surgical treatment options.    Routine Foot  Care    Performed by:  Robert Braun. DIANA  Authorized by:  Patient     Consent Done?:  Yes (Verbal)     Nail Care Type:  Debride  Location(s): All  (Left 1st Toe, Left 3rd Toe, Left 2nd Toe, Left 4th Toe, Left 5th Toe, Right 1st Toe, Right 2nd Toe, Right 3rd Toe, Right 4th Toe and Right 5th Toe)  Patient tolerance:  Patient tolerated the procedure well with no immediate complications     With patient's permission, the toenails mentioned above were aggressively reduced and debrided using a nail nipper, removing all offending nail and debris. The patient will continue to monitor the areas daily, inspect the feet, wear protective shoe gear when ambulatory, and moisturizer to maintain skin integrity.      Callus Care Type: Debride    With patient's permission, the calluses/hyperkeratotic lesions mentioned above were aggressively reduced and debrided using a number 15 blade. The patient will continue to monitor the areas daily, inspect the feet, wear protective shoe gear when ambulatory, and moisturizer to maintain skin integrity.       Discussed options for peripheral neuropathy/nerve entrapment syndrome including nerve block therapy, surgical nerve entrapment decompression procedures, and various vitamins and supplementation available shown to improve nerve function.    Procedure:  Corticosteroid injection bilateral foot  Surgeon:  Robert braun DPM  A steroid injection was performed at right and 2nd 1st metatarsal cuneiform joint using 1% plain Lidocaine and 1 mL/40 mg of Kenalog. This was well tolerated.      Shoe inspection. Diabetic Foot Education. Patient reminded of the importance of good nutrition and blood sugar control to help prevent podiatric complications of diabetes. Patient instructed on proper foot hygeine. We discussed wearing proper shoe gear, daily foot inspections and Diabetic foot education in detail.    Return to clinic in 3-6 months or sooner if problems arise

## 2024-03-19 ENCOUNTER — TELEPHONE (OUTPATIENT)
Dept: PODIATRY | Facility: CLINIC | Age: 74
End: 2024-03-19
Payer: MEDICARE

## 2024-03-19 NOTE — TELEPHONE ENCOUNTER
Samantha Patelothy Staff  Caller: Unspecified (Today,  3:21 PM)  Type:  Needs Medical Advice       Who Called: KAR COBIAN [[id number]]      Would the patient rather a call back or a response via [facility]? Call back    Best Call Back Number: [phone]      Additional Information: Pt is requesting a call back to discuss about there referral that was put in for back pain. She does not remember who the provider Dr braun wanted her to see and would like to know who can she needs to be with. Referral that was put in has been added to a pain mgt dr that she already been seen with but she dont believe that its the provider to be seen with. Please advise    Thank you!    Message forward to Dr. Braun

## 2024-03-26 ENCOUNTER — OFFICE VISIT (OUTPATIENT)
Dept: ORTHOPEDICS | Facility: CLINIC | Age: 74
End: 2024-03-26
Payer: MEDICARE

## 2024-03-26 DIAGNOSIS — M16.11 PRIMARY OSTEOARTHRITIS OF RIGHT HIP: Primary | ICD-10-CM

## 2024-03-26 PROCEDURE — 99213 OFFICE O/P EST LOW 20 MIN: CPT | Mod: S$PBB,,, | Performed by: ORTHOPAEDIC SURGERY

## 2024-03-26 PROCEDURE — 99213 OFFICE O/P EST LOW 20 MIN: CPT | Mod: PBBFAC | Performed by: ORTHOPAEDIC SURGERY

## 2024-03-26 PROCEDURE — 99999 PR PBB SHADOW E&M-EST. PATIENT-LVL III: CPT | Mod: PBBFAC,,, | Performed by: ORTHOPAEDIC SURGERY

## 2024-03-29 PROBLEM — M16.11 PRIMARY OSTEOARTHRITIS OF RIGHT HIP: Status: ACTIVE | Noted: 2024-03-29

## 2024-03-29 NOTE — PROGRESS NOTES
Subjective:       Patient ID: Chantal Ridley is a 74 y.o. female.    Chief Complaint:   Pain of the Right Hip  She comes in for initial opinion and advice at the recommendation of Dr. Rodrigez, for pain in the right hip.  She has known lumbar spondylosis with radiculopathy.  Her pain originates in her lower back and goes through the legs to the feet.  She says her pain is 10/10 at least part of each day.  She has not had any falls.  No trauma, accident, injury associated with the symptoms.  No pertinent surgery.  Notably, she does not have pain in the groin with weight-bearing.  No significant knee joint pain.    Past Medical History:   Diagnosis Date    Diabetes mellitus     Fibromyalgia     Hx of degenerative disc disease     neck & back    Hypertension      Past Surgical History:   Procedure Laterality Date    CATARACT EXTRACTION W/  INTRAOCULAR LENS IMPLANT  2016    INJECTION OF JOINT Right 2023    Procedure: INJECTION, JOINT RIGHT HIP;  Surgeon: Ike Valdez MD;  Location: [facility];  Service: Pain Management;  Laterality: Right;  [phone]    TRANSFORAMINAL EPIDURAL INJECTION OF STEROID Right 2024    Procedure: LUMBAR TRANSFORAMINAL RIGHT L3/4 AND L4/5;  Surgeon: Ike Valdez MD;  Location: [facility];  Service: Pain Management;  Laterality: Right;  [phone]  2 WK F/U SIGIFREDO    TUBAL LIGATION       No family history on file.  Social History     Socioeconomic History    Marital status: Single   Tobacco Use    Smoking status: Former     Current packs/day: 0.00     Types: Cigarettes     Quit date:      Years since quittin.2    Smokeless tobacco: Never   Substance and Sexual Activity    Alcohol use: No    Drug use: No       Current Outpatient Medications   Medication Sig Dispense Refill    amitriptyline (ELAVIL) 10 MG tablet Take 1 tablet (10 mg total) by mouth nightly as needed for Insomnia. 30 tablet 2    diazePAM (VALIUM) 5 MG tablet Take 1 tablet (5 mg total) by mouth  once. Take medication 30 minutes prior to procedure  for 1 dose 1 tablet 0    diclofenac sodium (SOLARAZE) 3 % gel Apply topically 2 (two) times daily.      estrogen, conjugated,-medroxyprogesterone 0.625-2.5mg (PREMPRO) 0.625-2.5 mg per tablet Take 1 tablet by mouth once daily.      fluconazole (DIFLUCAN) 150 MG Tab Take 1 tablet by mouth Now, And repeat in 48 hours.      gabapentin (NEURONTIN) 300 MG capsule Take 300 mg by mouth daily as needed.      hydrOXYzine pamoate (VISTARIL) 25 MG Cap Take 25 mg by mouth daily as needed.      irbesartan (AVAPRO) 150 MG tablet Take 150 mg by mouth every evening.      irbesartan (AVAPRO) 300 MG tablet Take 300 mg by mouth.      LIDOcaine (LIDODERM) 5 % Place 1 patch onto the skin once daily. Remove & Discard patch within 12 hours or as directed by MD 30 patch 6    LIDOcaine-prilocaine (EMLA) cream Apply topically as needed.      oxyCODONE-acetaminophen (PERCOCET)  mg per tablet Take 1 tablet by mouth every 12 (twelve) hours as needed for Pain. 60 tablet 0    phenyleph-min oil-petrolatum 0.25-14-74.9 % Oint Place 1 Application rectally.      phenylephrine-cocoa butter 0.25-88.44 % Supp suppository Place 1 suppository rectally.      sitagliptan-metformin (JANUMET)  mg per tablet Take 1 tablet by mouth 2 (two) times daily with meals.      tiZANidine (ZANAFLEX) 4 MG tablet Take 4 mg by mouth every 8 (eight) hours.       Current Facility-Administered Medications   Medication Dose Route Frequency Provider Last Rate Last Admin    triamcinolone acetonide injection 40 mg  40 mg Intramuscular 1 time in Clinic/HOD Robert Braun DPM         Facility-Administered Medications Ordered in Other Visits   Medication Dose Route Frequency Provider Last Rate Last Admin    0.9%  NaCl infusion   Intravenous Continuous Chris Farmer MD         Review of patient's allergies indicates:   Allergen Reactions    Ace inhibitors Anaphylaxis, Itching and Swelling    Amlodipine Swelling     Amoxicillin Anaphylaxis    Erythromycin Swelling and Hives     Tongue swelling    Penicillins Anaphylaxis    Losartan Hives     Headaches. Patient is currently taking Losartan for high b/p and states they are feeling fine.    Tramadol Itching    Olmesartan Rash     Other reaction(s): Skin Rashes / Eruption of skin, Benicar         Objective:      Physical Exam  Negative Stinchfield, negative C sign.  Negative FADIR.  Negative flip test.  Diffuse tenderness in the soft tissues of the lumbosacral junction and upper buttock on the right more than the left.  Tenderness of the right SI joint area.  Normal gait.  Knee benign without effusion with normal range of motion.  Distal neurovascular intact without fixed sensory deficit.  Imaging Review:   Recent x-rays do show advanced osteoarthritis of the right hip, with narrowing of the joint space and small marginal osteophytes.  Minimal changes on the left.  No acute findings or suspicious bone defects.  Incidental note is made of lumbar spondylosis  Assessment:   Lumbar spondylosis with radiculopathy  Plan:       I offered her my opinion that her right hip joint was not a significant pain generator for her based on the location of her pain and her physical exam today.  When she flexes her right hip against gravity, this causes pain in her lower back.  I do not think that she needs any intervention directed at her right hip joint at this time.  She will follow up with the spine providers.  The patient's pathophysiology was explained in detail with reference to x-rays, models, other visual aids as appropriate.  Treatment options were discussed in detail.  Questions were invited and answered to the patient's satisfaction.    Willam Faye MD  Orthopaedic Surgery

## 2024-04-09 ENCOUNTER — OFFICE VISIT (OUTPATIENT)
Dept: PAIN MEDICINE | Facility: CLINIC | Age: 74
End: 2024-04-09
Payer: MEDICARE

## 2024-04-09 VITALS
SYSTOLIC BLOOD PRESSURE: 152 MMHG | DIASTOLIC BLOOD PRESSURE: 77 MMHG | HEART RATE: 59 BPM | WEIGHT: 139.56 LBS | TEMPERATURE: 98 F | BODY MASS INDEX: 26.37 KG/M2

## 2024-04-09 DIAGNOSIS — G89.4 CHRONIC PAIN SYNDROME: Primary | ICD-10-CM

## 2024-04-09 DIAGNOSIS — Z79.891 ENCOUNTER FOR LONG-TERM OPIATE ANALGESIC USE: ICD-10-CM

## 2024-04-09 DIAGNOSIS — G57.81 NERVE ENTRAPMENT OF LOWER LIMB, RIGHT: ICD-10-CM

## 2024-04-09 DIAGNOSIS — M54.17 LUMBOSACRAL RADICULOPATHY: ICD-10-CM

## 2024-04-09 PROCEDURE — 99999 PR PBB SHADOW E&M-EST. PATIENT-LVL IV: CPT | Mod: PBBFAC,,, | Performed by: NURSE PRACTITIONER

## 2024-04-09 PROCEDURE — 99214 OFFICE O/P EST MOD 30 MIN: CPT | Mod: S$PBB,,, | Performed by: NURSE PRACTITIONER

## 2024-04-09 PROCEDURE — 99214 OFFICE O/P EST MOD 30 MIN: CPT | Mod: PBBFAC | Performed by: NURSE PRACTITIONER

## 2024-04-09 RX ORDER — PREGABALIN 75 MG/1
75 CAPSULE ORAL 2 TIMES DAILY
Qty: 60 CAPSULE | Refills: 1 | Status: SHIPPED | OUTPATIENT
Start: 2024-04-09 | End: 2024-06-08

## 2024-04-12 DIAGNOSIS — M47.26 OSTEOARTHRITIS OF SPINE WITH RADICULOPATHY, LUMBAR REGION: ICD-10-CM

## 2024-04-12 DIAGNOSIS — M25.551 RIGHT HIP PAIN: ICD-10-CM

## 2024-04-12 DIAGNOSIS — M71.38 CYST OF LUMBAR FACET JOINT: ICD-10-CM

## 2024-04-12 DIAGNOSIS — M54.17 LUMBOSACRAL RADICULOPATHY: ICD-10-CM

## 2024-04-12 NOTE — TELEPHONE ENCOUNTER
----- Message from Belinda Jaramillo sent at 4/12/2024  2:15 PM CDT -----  Regarding: refill  Type:  RX Refill Request    Who Called: Chantal    Refill or New Rx:refill    RX Name and Strength:oxyCODONE-acetaminophen (PERCOCET)  mg per tablet    How is the patient currently taking it? (ex. 1XDay):    Is this a 30 day or 90 day RX:    Preferred Pharmacy with phone number:[facility]   Phone: [phone]  Fax: [phone]          Local or Mail Order:local    Ordering Provider:    Would the patient rather a call back or a response via [facility]? call    Best Call Back Number: [phone]    Additional Information: can it please be done by 18th

## 2024-04-12 NOTE — TELEPHONE ENCOUNTER
Patient requesting refill on oxycodone  Last office visit 2-19-24   shows last refill on 3-19-24  Patient does have a pain contract on file with [facility] department  Patient last UDS 11-21-23 was consistent with current therapy     CODEINE   Not Detected   Comment: INTERPRETIVE INFORMATION: Codeine, U  Positive Cutoff: 40 ng/mL  Methodology: Mass Spectrometry   MORPHINE   Not Detected   Comment: INTERPRETIVE INFORMATION:Morphine, U  Positive Cutoff: 20 ng/mL  Methodology: Mass Spectrometry   6-ACETYLMORPHINE   Not Detected   Comment: INTERPRETIVE INFORMATION:6-acetylmorphine, U  Positive Cutoff: 20 ng/mL  Methodology: Mass Spectrometry   OXYCODONE   Present   Comment: INTERPRETIVE INFORMATION:Oxycodone, U  Positive Cutoff: 40 ng/mL  Methodology: Mass Spectrometry   NOROYXCODONE   Present   Comment: INTERPRETIVE INFORMATION:Noroxycodone, U  Positive Cutoff: 100 ng/mL  Methodology: Mass Spectrometry   OXYMORPHONE   Present   Comment: INTERPRETIVE INFORMATION:Oxymorphone, U  Positive Cutoff: 40 ng/mL  Methodology: Mass Spectrometry   NOROXYMORPHONE   Present   Comment: INTERPRETIVE INFORMATION:Noroxymorphone, U  Positive Cutoff: 100 ng/mL  Methodology: Mass Spectrometry   HYDROCODONE   Not Detected   Comment: INTERPRETIVE INFORMATION:Hydrocodone, U  Positive Cutoff: 40 ng/mL  Methodology: Mass Spectrometry   NORHYDROCODONE   Not Detected   Comment: INTERPRETIVE INFORMATION:Norhydrocodone, U  Positive Cutoff: 100 ng/mL  Methodology: Mass Spectrometry   HYDROMORPHONE   Not Detected   Comment: INTERPRETIVE INFORMATION:Hydromorphone, U  Positive Cutoff: 20 ng/mL  Methodology: Mass Spectrometry   BUPRENORPHINE   Not Detected   Comment: INTERPRETIVE INFORMATION:Buprenorphine, U  Positive Cutoff: 5 ng/mL  Methodology: Mass Spectrometry   NORUBPRENORPHINE   Not Detected   Comment: INTERPRETIVE INFORMATION:Norbuprenorphine, U  Positive Cutoff: 20 ng/mL  Methodology: Mass Spectrometry   FENTANYL   Not  Detected   Comment: INTERPRETIVE INFORMATION:Fentanyl, U  Positive Cutoff: 2 ng/mL  Methodology: Mass Spectrometry   NORFENTANYL   Not Detected   Comment: INTERPRETIVE INFORMATION:Norfentanyl, U  Positive Cutoff: 2 ng/mL  Methodology: Mass Spectrometry   MEPERIDINE METABOLITE   Not Detected   Comment: INTERPRETIVE INFORMATION:Meperidine metabolite, U  Positive Cutoff: 50 ng/mL  Methodology: Mass Spectrometry   TAPENTADOL   Not Detected   Comment: INTERPRETIVE INFORMATION:Tapentadol, U  Positive Cutoff: 100 ng/mL  Methodology: Mass Spectrometry   TAPENTADOL-O-SULF   Not Detected   Comment: INTERPRETIVE INFORMATION:Tapentadol-o-Sulf, U  Positive Cutoff: 200 ng/mL  Methodology: Mass Spectrometry   METHADONE   Negative   Comment: Presumptive negative by immunoassay. Testing by mass  spectrometry is available on request.  INTERPRETIVE INFORMATION: Methadone Screen, U  Positive Cutoff: 150 ng/mL  Methodology: Immunoassay   TRAMADOL   Negative   Comment: Presumptive negative by immunoassay. Testing by mass  spectrometry is available on request.  INTERPRETIVE INFORMATION:Tramadol Screen, U  Positive Cutoff: 100 ng/mL  Methodology: Immunoassay   AMPHETAMINE   Not Detected   Comment: INTERPRETIVE INFORMATION:Amphetamine, U  Positive Cutoff: 50 ng/mL  Methodology: Mass Spectrometry   METHAMPHETAMINE   Not Detected   Comment: INTERPRETIVE INFORMATION:Methamphetamine, U  Positive Cutoff: 200 ng/mL  Methodology: Mass Spectrometry   MDMA- ECSTASY   Not Detected   Comment: INTERPRETIVE INFORMATION:MDMA, U  Positive Cutoff: 200 ng/mL  Methodology: Mass Spectrometry   MDA   Not Detected   Comment: INTERPRETIVE INFORMATION:MDA, U  Positive Cutoff: 200 ng/mL  Methodology: Mass Spectrometry   MDEA- Annetta   Not Detected   Comment: INTERPRETIVE INFORMATION:MDEA, U  Positive Cutoff: 200 ng/mL  Methodology: Mass Spectrometry   METHYLPHENIDATE   Not Detected   Comment: INTERPRETIVE INFORMATION:Methylphenidate, U  Positive Cutoff: 100  ng/mL  Methodology: Mass Spectrometry   PHENTERMINE   Not Detected   Comment: INTERPRETIVE INFORMATION:Phentermine, U  Positive Cutoff: 100 ng/mL  Methodology: Mass Spectrometry   BENZOYLECGONINE   Negative   Comment: Presumptive negative by immunoassay. Testing by mass  spectrometry is available on request.  INTERPRETIVE INFORMATION:Cocaine Screen, U  Positive Cutoff: 150 ng/mL  Methodology: Immunoassay   ALPRAZOLAM   Not Detected   Comment: INTERPRETIVE INFORMATION:Alprazolam, U  Positive Cutoff: 40 ng/mL  Methodology: Mass Spectrometry   ALPHA-OH-ALPRAZOLAM   Not Detected   Comment: INTERPRETIVE INFORMATION:Alpha-OH-Alprazolam, U  Positive Cutoff: 20 ng/mL  Methodology: Mass Spectrometry   CLONAZEPAM   Not Detected   Comment: INTERPRETIVE INFORMATION:Clonazepam, U  Positive Cutoff: 20 ng/mL  Methodology: Mass Spectrometry   7-AMINOCLONAZEPAM   Not Detected   Comment: INTERPRETIVE INFORMATION:7-Aminoclonazepam, U  Positive Cutoff: 40 ng/mL  Methodology: Mass Spectrometry   DIAZEPAM   Not Detected   Comment: INTERPRETIVE INFORMATION:Diazepam, U  Positive Cutoff: 50 ng/mL  Methodology: Mass Spectrometry   NORDIAZEPAM   Not Detected   Comment: INTERPRETIVE INFORMATION:Nordiazepam, U  Positive Cutoff: 50 ng/mL  Methodology: Mass Spectrometry   OXAZEPAM   Not Detected   Comment: INTERPRETIVE INFORMATION:Oxazepam, U  Positive Cutoff: 50 ng/mL  Methodology: Mass Spectrometry   TEMAZEPAM   Not Detected   Comment: INTERPRETIVE INFORMATION:Temazepam, U  Positive Cutoff: 50 ng/mL  Methodology: Mass Spectrometry   Lorazepam   Not Detected   Comment: INTERPRETIVE INFORMATION:Lorazepam, U  Positive Cutoff: 60 ng/mL  Methodology: Mass Spectrometry   MIDAZOLAM   Not Detected   Comment: INTERPRETIVE INFORMATION:Midazolam, U  Positive Cutoff: 20 ng/mL  Methodology: Mass Spectrometry   ZOLPIDEM   Not Detected   Comment: INTERPRETIVE INFORMATION:Zolpidem, U  Positive Cutoff: 20 ng/mL  Methodology: Mass Spectrometry   BARBITURATES    Negative   Comment: Presumptive negative by immunoassay. Testing by mass  spectrometry is available on request.  INTERPRETIVE INFORMATION:Barbiturates Screen, U  Positive Cutoff: 200 ng/mL  Methodology: Immunoassay   Creatinine, Urine 20.0 - 400.0 mg/dL 138.5   ETHYL GLUCURONIDE   Negative   Comment: Presumptive negative by immunoassay. Testing by mass  spectrometry is available on request.  INTERPRETIVE INFORMATION:Ethyl Glucuronide Screen, U  Positive Cutoff: 500 ng/mL  Methodology: Immunoassay   MARIJUANA METABOLITE   Negative   Comment: Presumptive negative by immunoassay. Testing by mass  spectrometry is available on request.  INTERPRETIVE INFORMATION: THC (Cannabinoids) Screen, U  Positive Cutoff: 50 ng/mL  Methodology: Immunoassay   PCP   Negative   Comment: Presumptive negative by immunoassay. Testing by mass  spectrometry is available on request.  INTERPRETIVE INFORMATION:Phencyclidine Screen, U  Positive Cutoff: 25 ng/mL  Methodology: Immunoassay   CARISOPRODOL   Negative   Comment: Presumptive negative by immunoassay. Testing by mass  spectrometry is available on request.  INTERPRETIVE INFORMATION: Carisoprodol Screen, U  Positive Cutoff: 100 ng/mL  Methodology: Immunoassay  The carisoprodol immunoassay has cross-reactivity to  carisoprodol and meprobamate.   Naloxone   Not Detected   Comment: INTERPRETIVE INFORMATION:Naloxone, U  Positive Cutoff: 100 ng/mL  Methodology: Mass Spectrometry   Gabapentin   Present   Comment: INTERPRETIVE INFORMATION:Gabapentin, U  Positive Cutoff: 3,000 ng/mL  Methodology: Mass Spectrometry   Pregabalin   Not Detected   Comment: INTERPRETIVE INFORMATION:Pregabalin, U  Positive Cutoff: 3,000 ng/mL  Methodology: Mass Spectrometry   Alpha-OH-Midazolam   Not Detected   Comment: INTERPRETIVE INFORMATION:Alpha-OH-Midazolam, U  Positive Cutoff: 20 ng/mL  Methodology: Mass Spectrometry   Zolpidem Metabolite   Not Detected   Comment: INTERPRETIVE INFORMATION:Zolpidem Metabolite,  U  Positive Cutoff: 100 ng/mL  Methodology: Mass Spectrometry

## 2024-04-14 RX ORDER — OXYCODONE AND ACETAMINOPHEN 10; 325 MG/1; MG/1
1 TABLET ORAL EVERY 12 HOURS PRN
Qty: 60 TABLET | Refills: 0 | Status: SHIPPED | OUTPATIENT
Start: 2024-04-18 | End: 2024-05-14 | Stop reason: SDUPTHER

## 2024-04-15 PROCEDURE — 99999PBSHW PR PBB SHADOW TECHNICAL ONLY FILED TO HB: Mod: PBBFAC,,,

## 2024-04-15 RX ADMIN — TRIAMCINOLONE ACETONIDE 40 MG: 40 INJECTION, SUSPENSION INTRA-ARTICULAR; INTRAMUSCULAR at 07:04

## 2024-04-18 ENCOUNTER — CLINICAL SUPPORT (OUTPATIENT)
Dept: REHABILITATION | Facility: HOSPITAL | Age: 74
End: 2024-04-18
Payer: MEDICARE

## 2024-04-18 DIAGNOSIS — M47.816 LUMBAR SPONDYLOSIS: ICD-10-CM

## 2024-04-18 DIAGNOSIS — M25.69 DECREASED ROM OF TRUNK AND BACK: Primary | ICD-10-CM

## 2024-04-18 PROCEDURE — 97162 PT EVAL MOD COMPLEX 30 MIN: CPT

## 2024-04-18 PROCEDURE — 97530 THERAPEUTIC ACTIVITIES: CPT
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
## 2024-04-23 ENCOUNTER — CLINICAL SUPPORT (OUTPATIENT)
Dept: REHABILITATION | Facility: HOSPITAL | Age: 74
End: 2024-04-23
Payer: MEDICARE

## 2024-04-23 DIAGNOSIS — M25.69 DECREASED ROM OF TRUNK AND BACK: Primary | ICD-10-CM

## 2024-04-23 PROCEDURE — 97750 PHYSICAL PERFORMANCE TEST: CPT

## 2024-04-23 PROCEDURE — 97110 THERAPEUTIC EXERCISES: CPT

## 2024-04-23 NOTE — PROGRESS NOTES
"[facility] Treatment      Name: Chantal Ridley  Clinic Number: [id number]    Therapy Diagnosis:   Encounter Diagnosis   Name Primary?    Decreased ROM of trunk and back Yes     Physician: Jeronimo Rodrigez MD    Visit Date: 2024  Physician Orders: PT Eval and Treat  Medical Diagnosis from Referral:   Diagnosis   M47.816 (ICD-10-CM) - Lumbar spondylosis      Evaluation Date: 2024  Authorization Period Expiration: 24  Plan of Care Expiration: 2024  Reassessment Due: 2024  Visit # / Visits authorized:   MedX testing visit 2     Time In: 2  Time Out: 245  Total Billable Time: 40minutes Pt needs HHA at times  INSURANCE and OUTCOMES: Fee for Service with FOTO Outcomes 1/3     Precautions: standard, HTN, and diabetes     Pattern of pain determined: 1PEN    Subjective   Chantal reports arrives today without AD.  Mod gait deviations exist.  2/2 R anterior thigh and LB pain.     Patient reports tolerating previous visit well  Patient reports their pain to be 8/10 on a 0-10 scale with 0 being no pain and 10 being the worst pain imaginable.  Pain Location: LB B\  Social History:  lives alone in shotgun home, has hand rails and 3 steps into house  Occupation: retired  Leisure: visit with friends   Pt goals: " decrease pain"     Objective     Baseline IM Testing Results:   Date of testin24  ROM 12=30 deg   Max Peak Torque 92    Min Peak Torque 69    Flex/Ext Ratio 1.3:1   % below normative data 23     OUTCOMES SELECTION:   Intake Outcome Measure for FOTO Lumbar Survey     Therapist reviewed FOTO scores for Chantal Ridley on 2024.   FOTO documents entered into EPIC - see Media section.     Intake Score: 13% functional ability  Goal Score: 30% functional ability         Treatment    Chantal received the treatments listed below:      MedX testing performed day 2: Patient  received neuromuscular education to engage spinal musculature correctly for motor " control and engagement of musculature for 15 minutes including the MedX exercise component and practice and standard testing. MedX dynamic exercise and baseline isometric test performed with instructions to guide the patient safely through the testing procedure. Patient instructed to perform isometric test correctly and safely while building to an optimal force with a pain-free effort. Patient also instructed that they should feel support/pressure from MedX restraints but no pain/discomfort, and encouraged to report any pain to therapist. Patient demonstrated appropriate understanding of information and tolerance of test.  Education regarding purpose of test, safety during test given, and reviewed possible more soreness and strategies.          4/23/2024     1:33 PM   [facility]   Visit Number 2   VAS Pain Rating 9   Time 5   Flexion in Lying 10   Lumbar Extension Seat Pad 2   Femur Restraint 6   Top Dead Center 24   Counterweight 181   Lumbar Flexion 30   Lumbar Extension 12   Lumbar Peak Torque 92 ft. lbs.   Min Torque 69   Test Percent Below Normative Data 23 %   Ice - Z Lie (in min.) 5         therapeutic exercises to develop strength, endurance, ROM, flexibility, posture, and core stabilization for 30 minutes including:  LTR 10x  BKTC c/ ball  PPT 10x  SAQ 20x B  BKFO c/YTB 10x    Peripheral muscle strengthening which included 1 set of 15-20 repetitions at a slow, controlled 10-13 second per rep pace focused on strengthening supporting musculature for improved body mechanics and functional mobility.  Pt and therapist focused on proper form during treatment to ensure optimal strengthening of each targeted muscle group.  Machines were utilized including torso rotation, leg press, hip abd and hip add, leg ext.  Leg curl, triceps, biceps, chest and row added visit 3        cold pack for 5 minutes to LB.    Home Exercises Provided and Patient Education Provided   Home exercises include:LTR  Cardio  program:5/16/24  Lifting education date:TBD Fridge Magnet Discharge handout (date given):  Equipment at home/gym membership: no      Education provided:   - exertion levels  Review of HEP  Also discussed importance of normalizing gait pattern /posture  Written Home Exercises Provided: Patient instructed to cont prior HEP.  Exercises were reviewed and Chantal was able to demonstrate them prior to the end of the session.  Chantal demonstrated good  understanding of the education provided.     See EMR under Patient Instructions for exercises provided prior visit.          Assessment     Pt presents to second healthy back visit reporting 9/10, was able to demo HEP with Mod VC for form.  Added SAQ's to work on R LE strength.  Isometric testing on Med X performed today. Upon testing patient was 23% below age related strength norms. Gradually add exercises as tolerated.   Pt was also able to complete half of the peripheral strengthening exercises without increased discomfort and will complete the complete circuit next visit as tolerated.  Patient is making fair progress towards established goals.  Pt will continue to benefit from skilled outpatient physical therapy to address the deficits stated in the impairment chart, provide pt/family education and to maximize pt's level of independence in the home and community environment.     Anticipated Barriers for therapy: transportation/pain level   Pt's spiritual, cultural and educational needs considered and pt agreeable to plan of care and goals as stated below:       GOALS: Pt is in agreement with the following goals.     Short term goals:  6 weeks or 10 visits   - Pt will demonstrate increased lumbar MedX ROM by at least 3 degrees from the initial ROM value with improvements noted in functional ROM and ability to perform ADLs. Appropriate and Ongoing  - Pt will demonstrate increased MedX average isometric strength value by 20% from initial test resulting in improved ability to  perform bending, lifting, and carrying activities safely, confidently. Appropriate and Ongoing  - Pt will report a reduction in worst pain score by 1-2 points for improved tolerance for standing. Appropriate and Ongoing  - Pt able to perform HEP correctly with minimal cueing or supervision from therapist to encourage independent management of symptoms. Appropriate and Ongoing     Long term goals: 10 weeks or 20 visits   - Pt will demonstrate increased lumbar MedX ROM by at least 9 degrees from initial ROM value, resulting in improved ability to perform functional forward bending while standing and sitting. Appropriate and Ongoing  - Pt will demonstrate increased MedX average isometric strength value by 40% from initial test resulting in improved ability to perform bending, lifting, and carrying activities safely and confidently. Appropriate and Ongoing  - Pt to demonstrate ability to independently control and reduce their pain through posture positioning and mechanical movements throughout a typical day. Appropriate and Ongoing  - Pt will demonstrate reduced pain and improved functional outcomes as reported on the FOTO by reaching an intake score of >/= 30% functional ability in order to demonstrate subjective improvement in patient's condition. . Appropriate and Ongoing  - Pt will demonstrate independence with the HEP at discharge. Appropriate and Ongoing  - Pt(patient goal)will be able to walk without AD > 5 minutes without sitting  Appropriate and Ongoing        Plan   Continue with established Plan of Care towards established PT goals.       Therapist: Paola Temple, PT  4/23/2024

## 2024-05-01 ENCOUNTER — OFFICE VISIT (OUTPATIENT)
Dept: PAIN MEDICINE | Facility: CLINIC | Age: 74
End: 2024-05-01
Attending: ANESTHESIOLOGY
Payer: MEDICARE

## 2024-05-01 VITALS
HEART RATE: 58 BPM | BODY MASS INDEX: 27.62 KG/M2 | DIASTOLIC BLOOD PRESSURE: 79 MMHG | TEMPERATURE: 98 F | SYSTOLIC BLOOD PRESSURE: 172 MMHG | WEIGHT: 146.19 LBS

## 2024-05-01 DIAGNOSIS — M47.26 OSTEOARTHRITIS OF SPINE WITH RADICULOPATHY, LUMBAR REGION: Primary | ICD-10-CM

## 2024-05-01 DIAGNOSIS — M48.061 SPINAL STENOSIS OF LUMBAR REGION WITHOUT NEUROGENIC CLAUDICATION: ICD-10-CM

## 2024-05-01 PROCEDURE — 99213 OFFICE O/P EST LOW 20 MIN: CPT | Mod: PBBFAC | Performed by: ANESTHESIOLOGY

## 2024-05-01 PROCEDURE — 99999 PR PBB SHADOW E&M-EST. PATIENT-LVL III: CPT | Mod: PBBFAC,,, | Performed by: ANESTHESIOLOGY

## 2024-05-01 PROCEDURE — 99214 OFFICE O/P EST MOD 30 MIN: CPT | Mod: S$PBB,GC,, | Performed by: ANESTHESIOLOGY

## 2024-05-01 RX ORDER — HYDROMORPHONE HYDROCHLORIDE 4 MG/1
4 TABLET ORAL EVERY 12 HOURS PRN
Qty: 60 TABLET | Refills: 0 | Status: SHIPPED | OUTPATIENT
Start: 2024-05-01 | End: 2024-05-29

## 2024-05-01 NOTE — PROGRESS NOTES
Chronic patient Established Note (Follow up visit)      SUBJECTIVE:    Interval History 4/9/2024:  The patient is here for follow up of back pain. The pain continues to radiate down the back of the right leg. She had no benefit with HERACLIO in the past. Since previous encounter, she did f/u with Dr. Rodrigez and she did not wish to pursue surgical options at this time. He referred her to Ortho to have her right hip checked. She saw Dr. Faye who does not think that her hip is the issue. He believes that her primary cause is her back, which I agree with. She was given a Toradol shot last month by podiatry which she says did not help her at all for any area of pain. She is starting [facility] on 4/18/24. She is taking Percocet 10/325 mg BID which she says does not help very much. She is asking about ITP opioid pain medications. Her pain today is 10/10.    Interval History 2/19/2024:  The patient is here for follow up of right sided back and leg pain. She is s/p right L3/4 and L4/5 TF HERACLIO with no relief, not even short term. She actually feels like it worsened her pain. Leg pain is greater than back pain. She continues to have right sided back pain with radiation down the front of the right leg. She has associated numbness and weakness. She does have some symptoms on the left, but the right side is the worse. She did see Dr. Rodrigez last year who discussed surgery if epidural did not help. [place] She continues to take Percocet BID with mild benefit. She stopped Gabapentin due to minimal benefit. She is requesting Naproxen as she feels like it was helpful in the past. Her pain today is 10/10.    Interval History 1/16/2024:  Chantal Ridley presents to the clinic for a follow-up appointment for hip pain. She is s/p right hip joint injection on 12/18/2023. She reports relief for 2 weeks. Then, her pain returned to baseline. She continues to report low back pain that  radiates into the anterolateral aspect of her right thigh to her knee. She denies any left leg pain. She does have right hip pain. Her pain is worse with standing and walking. She also reports pain with moving from sitting to standing. She is currently taking Percocet as needed with benefit. She denies any adverse effects. She denies any other health changes. Her pain today is 10/10.    HPI  A former patient of Dr. Garland who was on Percocet for chronic pain.  She was given 7.5 to use twice a day.  They had recommended interventions but she declined.  She is interested in interventions if they would help.  She has chronic right lower back pain that radiates to the right groin and thigh.  It also extends this down to the foot anterior and dorsally.  There is sometimes tingling but no numbness.  She does not appreciate weakness.  She is stiff when she gets up and takes her a while to get moving.  There is no imaging of her hip but she thinks she had 1 at [facility].  She had therapy in the remote past and has not been following with the exercises.  She is interested in getting back into therapy.    Pain Disability Index Review:      5/1/2024     1:57 PM 4/9/2024     1:57 PM 1/16/2024     2:16 PM   Last 3 PDI Scores   Pain Disability Index (PDI) 70 70 32       Pain Medications:  Percocet    Opioid Contract: yes     report:  Reviewed and consistent with medication use as prescribed.    Pain Procedures:   12/18/2023- Right hip joint injection  2/5/24 Right L3/4 and L4/5 TF HERACLIO- no relief    Physical Therapy/Home Exercise: yes    Imaging:   Xray Hips 11/21/2023:  CLINICAL HISTORY:  Pain in right hip     FINDINGS:  Two views right hip: There is severe DJD and impingement change.  No fracture dislocation bone destruction seen.    MRI Lumbar Spine 6/21/2023:  COMPARISON:  X-ray 05/30/2023     FINDINGS:  Lumbar spine alignment is within normal limits. The vertebral body heights are well maintained, with no fracture.   Degenerative endplate sclerosis is seen at L2-3.  No marrow signal abnormality to suggest infiltrative process.     The conus is normal in appearance.  The adjacent soft tissue structures show no significant abnormalities.     L1-L2: Circumferential disc bulge and spondylitic spurring.No significant central canal or neural foraminal narrowing.     L2-L3: Circumferential disc bulge, spondylitic spurring, ligamentum flavum thickening and facet arthropathy.  Moderate central spinal stenosis and severe right-sided foraminal stenosis.     L3-L4: Circumferential disc bulge, spondylitic spurring and facet arthropathy.  Posteriorly projecting synovial cyst arising from the right facet.  Severe central spinal stenosis.  No foraminal stenosis.     L4-L5: Circumferential disc bulge, spondylitic spurring and severe facet arthropathy.  Moderate central canal stenosis and no significant foraminal stenosis.     L5-S1: Circumferential disc bulge, spondylitic spurring and superimposed right paracentral and medial foraminal disc protrusion.  No central canal stenosis.  Moderate left-sided foraminal stenosis.     Impression:     Multilevel extradural degenerative change producing moderate to severe spinal stenosis and areas of foraminal stenosis, most pronounced at L3-4 centrally.    Allergies:   Review of patient's allergies indicates:   Allergen Reactions    Ace inhibitors Anaphylaxis, Itching and Swelling    Amlodipine Swelling    Amoxicillin Anaphylaxis    Erythromycin Swelling and Hives     Tongue swelling    Penicillins Anaphylaxis    Losartan Hives     Headaches. Patient is currently taking Losartan for high b/p and states they are feeling fine.    Tramadol Itching    Olmesartan Rash     Other reaction(s): Skin Rashes / Eruption of skin, Benicar       Current Medications:   Current Outpatient Medications   Medication Sig Dispense Refill    amitriptyline (ELAVIL) 10 MG tablet Take 1 tablet (10 mg total) by mouth nightly as  needed for Insomnia. 30 tablet 2    diclofenac sodium (SOLARAZE) 3 % gel Apply topically 2 (two) times daily.      estrogen, conjugated,-medroxyprogesterone 0.625-2.5mg (PREMPRO) 0.625-2.5 mg per tablet Take 1 tablet by mouth once daily.      estrogen, conjugated,-medroxyprogesterone 0.625-2.5mg (PREMPRO) 0.625-2.5 mg per tablet Take 1 tablet by mouth once daily. 30 tablet 12    fluconazole (DIFLUCAN) 150 MG Tab Take 1 tablet by mouth Now, And repeat in 48 hours.      hydrOXYzine pamoate (VISTARIL) 25 MG Cap Take 25 mg by mouth daily as needed.      irbesartan (AVAPRO) 150 MG tablet Take 150 mg by mouth every evening.      irbesartan (AVAPRO) 300 MG tablet Take 300 mg by mouth.      LIDOcaine (LIDODERM) 5 % Place 1 patch onto the skin once daily. Remove & Discard patch within 12 hours or as directed by MD 30 patch 6    LIDOcaine-prilocaine (EMLA) cream Apply topically as needed.      oxyCODONE-acetaminophen (PERCOCET)  mg per tablet Take 1 tablet by mouth every 12 (twelve) hours as needed for Pain. 60 tablet 0    phenyleph-min oil-petrolatum 0.25-14-74.9 % Oint Place 1 Application rectally.      phenylephrine-cocoa butter 0.25-88.44 % Supp suppository Place 1 suppository rectally.      pregabalin (LYRICA) 75 MG capsule Take 1 capsule (75 mg total) by mouth 2 (two) times daily. 60 capsule 1    sitagliptan-metformin (JANUMET)  mg per tablet Take 1 tablet by mouth 2 (two) times daily with meals.      tiZANidine (ZANAFLEX) 4 MG tablet Take 4 mg by mouth every 8 (eight) hours.      diazePAM (VALIUM) 5 MG tablet Take 1 tablet (5 mg total) by mouth once. Take medication 30 minutes prior to procedure  for 1 dose 1 tablet 0    HYDROmorphone (DILAUDID) 4 MG tablet Take 1 tablet (4 mg total) by mouth every 12 (twelve) hours as needed for Pain. 60 tablet 0     No current facility-administered medications for this visit.     Facility-Administered Medications Ordered in Other Visits   Medication Dose Route Frequency  Provider Last Rate Last Admin    0.9%  NaCl infusion   Intravenous Continuous Chris Farmer MD           REVIEW OF SYSTEMS:    GENERAL:  No weight loss, malaise or fevers.  HEENT:  Negative for frequent or significant headaches.  NECK:  Negative for lumps, goiter, pain and significant neck swelling.  RESPIRATORY:  Negative for cough, wheezing or shortness of breath.  CARDIOVASCULAR:  Negative for chest pain, leg swelling or palpitations. HTN  GI:  Negative for abdominal discomfort, blood in stools or black stools or change in bowel habits.  MUSCULOSKELETAL:  See HPI.  SKIN:  Negative for lesions, rash, and itching.  PSYCH:  Negative for sleep disturbance, mood disorder and recent psychosocial stressors.  ENDO: Diabetes  HEMATOLOGY/LYMPHOLOGY:  Negative for prolonged bleeding, bruising easily or swollen nodes.  NEURO:   No history of headaches, syncope, paralysis, seizures or tremors.  All other reviewed and negative other than HPI.    Past Medical History:  Past Medical History:   Diagnosis Date    Diabetes mellitus     Fibromyalgia     Hx of degenerative disc disease     neck & back    Hypertension        Past Surgical History:  Past Surgical History:   Procedure Laterality Date    CATARACT EXTRACTION W/  INTRAOCULAR LENS IMPLANT  05/17/2016    INJECTION OF JOINT Right 12/18/2023    Procedure: INJECTION, JOINT RIGHT HIP;  Surgeon: Ike Valdez MD;  Location: [facility] PAIN T;  Service: Pain Management;  Laterality: Right;  [phone]    TRANSFORAMINAL EPIDURAL INJECTION OF STEROID Right 2/5/2024    Procedure: LUMBAR TRANSFORAMINAL RIGHT L3/4 AND L4/5;  Surgeon: Ike Valdez MD;  Location: [facility] PAIN MGT;  Service: Pain Management;  Laterality: Right;  [phone]  2 WK F/U SIGIFREDO    TUBAL LIGATION         Family History:  No family history on file.    Social History:  Social History     Socioeconomic History    Marital status: Single   Tobacco Use    Smoking status: Former     Current packs/day: 0.00     Types:  Cigarettes     Quit date:      Years since quittin.3    Smokeless tobacco: Never   Substance and Sexual Activity    Alcohol use: No    Drug use: No     Social Determinants of Health     Financial Resource Strain: Unknown (2023)    [place] [facility]    Overall Financial Resource Strain (CARDIA)     Difficulty of Paying Living Expenses: Patient declined   Food Insecurity: Unknown (2023)    [place] [facility]    Hunger Vital Sign     Worried About Running Out of Food in the Last Year: Patient declined     Ran Out of Food in the Last Year: Patient declined   Transportation Needs: Unmet Transportation Needs (2023)    [place] [facility]    PRAPARE - Transportation     Lack of Transportation (Medical): No     Lack of Transportation (Non-Medical): Yes   Stress: No Stress Concern Present (2023)    [place] [facility] [employer] [facility]    [employer] - Occupational Stress Questionnaire     Feeling of Stress : Not at all   Housing Stability: Unknown (2023)    [place] [facility]    Housing Stability Vital Sign     Unable to Pay for Housing in the Last Year: No     In the last 12 months, was there a time when you did not have a steady place to sleep or slept in a shelter (including now)?: No       OBJECTIVE:    BP (!) 172/79   Pulse (!) 58   Temp 97.6 °F (36.4 °C)   Wt 66.3 kg (146 lb 2.6 oz)   BMI 27.62 kg/m²     PHYSICAL EXAMINATION:    General appearance: Well appearing, in no acute distress, alert and oriented x3.  Psych:  Mood and affect appropriate.  Skin: Skin color, texture, turgor normal, no rashes or lesions, in both upper and lower body.  Head/face:  Atraumatic, normocephalic. No palpable lymph nodes  Back: Straight leg raising in the sitting position is positive to radicular pain bilaterally. Limited ROM with pain on extension and flexion. Positive facet loading  bilaterally.  Extremities: No deformities, edema, or skin discoloration. Good capillary refill.  Musculoskeletal: 4/5 strength in right ankle with plantar and dorsiflexion. 5/5 strength in left ankle with plantar and dorsiflexion. 4/5 strength with right knee flexion and extension. 5/5 strength with left knee flexion and extension. 5/5 strength in right EHL, 5/5 strength in left EHL.  Neuro: Decreased sensation to RLE.  Gait: Antalgic- ambulates without assistance.     ASSESSMENT: 74 y.o. year old female with low back and hip pain, consistent with the followin. Osteoarthritis of spine with radiculopathy, lumbar region  Procedure Order to Pain Management    HYDROmorphone (DILAUDID) 4 MG tablet      2. Spinal stenosis of lumbar region without neurogenic claudication  Procedure Order to Pain Management    HYDROmorphone (DILAUDID) 4 MG tablet        PLAN:  We discussed with the patient the assessment and recommendations. The following is the plan we agreed on:  We will switch her from oxycodone to Dilaudid 4 mg twice a day as needed for pain.    Caudal epidural steroid injection.  We reviewed the MRI and she has multilevel degenerative disease with variable degree of stenosis.  There is a broad-based disc at L4/5 and a right-sided disc protrusion at L5-S1.  I discussed with her those findings on MRI.  Return as needed.  Otherwise follow-up 2 weeks after the epidural.  Should her symptomatology persists consider spinal cord stimulator for the intractable back pain as she declines to do surgery.    The above plan and management options were discussed at length with patient. Patient is in agreement with the above and verbalized understanding.    Ike Valdez  2024

## 2024-05-01 NOTE — H&P (VIEW-ONLY)
Chronic patient Established Note (Follow up visit)      SUBJECTIVE:    Interval History 4/9/2024:  The patient is here for follow up of back pain. The pain continues to radiate down the back of the right leg. She had no benefit with HERACLIO in the past. Since previous encounter, she did f/u with Dr. Rodrigez and she did not wish to pursue surgical options at this time. He referred her to Ortho to have her right hip checked. She saw Dr. Faye who does not think that her hip is the issue. He believes that her primary cause is her back, which I agree with. She was given a Toradol shot last month by podiatry which she says did not help her at all for any area of pain. She is starting Healthy Back on 4/18/24. She is taking Percocet 10/325 mg BID which she says does not help very much. She is asking about ITP opioid pain medications. Her pain today is 10/10.    Interval History 2/19/2024:  The patient is here for follow up of right sided back and leg pain. She is s/p right L3/4 and L4/5 TF HERACLIO with no relief, not even short term. She actually feels like it worsened her pain. Leg pain is greater than back pain. She continues to have right sided back pain with radiation down the front of the right leg. She has associated numbness and weakness. She does have some symptoms on the left, but the right side is the worse. She did see Dr. Rodrigez last year who discussed surgery if epidural did not help. [place] She continues to take Percocet BID with mild benefit. She stopped Gabapentin due to minimal benefit. She is requesting Naproxen as she feels like it was helpful in the past. Her pain today is 10/10.    Interval History 1/16/2024:  Chantal Ridley presents to the clinic for a follow-up appointment for hip pain. She is s/p right hip joint injection on 12/18/2023. She reports relief for 2 weeks. Then, her pain returned to baseline. She continues to report low back pain that  radiates into the anterolateral aspect of her right thigh to her knee. She denies any left leg pain. She does have right hip pain. Her pain is worse with standing and walking. She also reports pain with moving from sitting to standing. She is currently taking Percocet as needed with benefit. She denies any adverse effects. She denies any other health changes. Her pain today is 10/10.    HPI  A former patient of Dr. Garland who was on Percocet for chronic pain.  She was given 7.5 to use twice a day.  They had recommended interventions but she declined.  She is interested in interventions if they would help.  She has chronic right lower back pain that radiates to the right groin and thigh.  It also extends this down to the foot anterior and dorsally.  There is sometimes tingling but no numbness.  She does not appreciate weakness.  She is stiff when she gets up and takes her a while to get moving.  There is no imaging of her hip but she thinks she had 1 at [facility].  She had therapy in the remote past and has not been following with the exercises.  She is interested in getting back into therapy.    Pain Disability Index Review:      5/1/2024     1:57 PM 4/9/2024     1:57 PM 1/16/2024     2:16 PM   Last 3 PDI Scores   Pain Disability Index (PDI) 70 70 32       Pain Medications:  Percocet    Opioid Contract: yes     report:  Reviewed and consistent with medication use as prescribed.    Pain Procedures:   12/18/2023- Right hip joint injection  2/5/24 Right L3/4 and L4/5 TF HERACLIO- no relief    Physical Therapy/Home Exercise: yes    Imaging:   Xray Hips 11/21/2023:  CLINICAL HISTORY:  Pain in right hip     FINDINGS:  Two views right hip: There is severe DJD and impingement change.  No fracture dislocation bone destruction seen.    MRI Lumbar Spine 6/21/2023:  COMPARISON:  X-ray 05/30/2023     FINDINGS:  Lumbar spine alignment is within normal limits. The vertebral body heights are well maintained, with no fracture.   Degenerative endplate sclerosis is seen at L2-3.  No marrow signal abnormality to suggest infiltrative process.     The conus is normal in appearance.  The adjacent soft tissue structures show no significant abnormalities.     L1-L2: Circumferential disc bulge and spondylitic spurring.No significant central canal or neural foraminal narrowing.     L2-L3: Circumferential disc bulge, spondylitic spurring, ligamentum flavum thickening and facet arthropathy.  Moderate central spinal stenosis and severe right-sided foraminal stenosis.     L3-L4: Circumferential disc bulge, spondylitic spurring and facet arthropathy.  Posteriorly projecting synovial cyst arising from the right facet.  Severe central spinal stenosis.  No foraminal stenosis.     L4-L5: Circumferential disc bulge, spondylitic spurring and severe facet arthropathy.  Moderate central canal stenosis and no significant foraminal stenosis.     L5-S1: Circumferential disc bulge, spondylitic spurring and superimposed right paracentral and medial foraminal disc protrusion.  No central canal stenosis.  Moderate left-sided foraminal stenosis.     Impression:     Multilevel extradural degenerative change producing moderate to severe spinal stenosis and areas of foraminal stenosis, most pronounced at L3-4 centrally.    Allergies:   Review of patient's allergies indicates:   Allergen Reactions    Ace inhibitors Anaphylaxis, Itching and Swelling    Amlodipine Swelling    Amoxicillin Anaphylaxis    Erythromycin Swelling and Hives     Tongue swelling    Penicillins Anaphylaxis    Losartan Hives     Headaches. Patient is currently taking Losartan for high b/p and states they are feeling fine.    Tramadol Itching    Olmesartan Rash     Other reaction(s): Skin Rashes / Eruption of skin, Benicar       Current Medications:   Current Outpatient Medications   Medication Sig Dispense Refill    amitriptyline (ELAVIL) 10 MG tablet Take 1 tablet (10 mg total) by mouth nightly as  needed for Insomnia. 30 tablet 2    diclofenac sodium (SOLARAZE) 3 % gel Apply topically 2 (two) times daily.      estrogen, conjugated,-medroxyprogesterone 0.625-2.5mg (PREMPRO) 0.625-2.5 mg per tablet Take 1 tablet by mouth once daily.      estrogen, conjugated,-medroxyprogesterone 0.625-2.5mg (PREMPRO) 0.625-2.5 mg per tablet Take 1 tablet by mouth once daily. 30 tablet 12    fluconazole (DIFLUCAN) 150 MG Tab Take 1 tablet by mouth Now, And repeat in 48 hours.      hydrOXYzine pamoate (VISTARIL) 25 MG Cap Take 25 mg by mouth daily as needed.      irbesartan (AVAPRO) 150 MG tablet Take 150 mg by mouth every evening.      irbesartan (AVAPRO) 300 MG tablet Take 300 mg by mouth.      LIDOcaine (LIDODERM) 5 % Place 1 patch onto the skin once daily. Remove & Discard patch within 12 hours or as directed by MD 30 patch 6    LIDOcaine-prilocaine (EMLA) cream Apply topically as needed.      oxyCODONE-acetaminophen (PERCOCET)  mg per tablet Take 1 tablet by mouth every 12 (twelve) hours as needed for Pain. 60 tablet 0    phenyleph-min oil-petrolatum 0.25-14-74.9 % Oint Place 1 Application rectally.      phenylephrine-cocoa butter 0.25-88.44 % Supp suppository Place 1 suppository rectally.      pregabalin (LYRICA) 75 MG capsule Take 1 capsule (75 mg total) by mouth 2 (two) times daily. 60 capsule 1    sitagliptan-metformin (JANUMET)  mg per tablet Take 1 tablet by mouth 2 (two) times daily with meals.      tiZANidine (ZANAFLEX) 4 MG tablet Take 4 mg by mouth every 8 (eight) hours.      diazePAM (VALIUM) 5 MG tablet Take 1 tablet (5 mg total) by mouth once. Take medication 30 minutes prior to procedure  for 1 dose 1 tablet 0    HYDROmorphone (DILAUDID) 4 MG tablet Take 1 tablet (4 mg total) by mouth every 12 (twelve) hours as needed for Pain. 60 tablet 0     No current facility-administered medications for this visit.     Facility-Administered Medications Ordered in Other Visits   Medication Dose Route Frequency  Provider Last Rate Last Admin    0.9%  NaCl infusion   Intravenous Continuous Chris Farmer MD           REVIEW OF SYSTEMS:    GENERAL:  No weight loss, malaise or fevers.  HEENT:  Negative for frequent or significant headaches.  NECK:  Negative for lumps, goiter, pain and significant neck swelling.  RESPIRATORY:  Negative for cough, wheezing or shortness of breath.  CARDIOVASCULAR:  Negative for chest pain, leg swelling or palpitations. HTN  GI:  Negative for abdominal discomfort, blood in stools or black stools or change in bowel habits.  MUSCULOSKELETAL:  See HPI.  SKIN:  Negative for lesions, rash, and itching.  PSYCH:  Negative for sleep disturbance, mood disorder and recent psychosocial stressors.  ENDO: Diabetes  HEMATOLOGY/LYMPHOLOGY:  Negative for prolonged bleeding, bruising easily or swollen nodes.  NEURO:   No history of headaches, syncope, paralysis, seizures or tremors.  All other reviewed and negative other than HPI.    Past Medical History:  Past Medical History:   Diagnosis Date    Diabetes mellitus     Fibromyalgia     Hx of degenerative disc disease     neck & back    Hypertension        Past Surgical History:  Past Surgical History:   Procedure Laterality Date    CATARACT EXTRACTION W/  INTRAOCULAR LENS IMPLANT  05/17/2016    INJECTION OF JOINT Right 12/18/2023    Procedure: INJECTION, JOINT RIGHT HIP;  Surgeon: Ike Valdez MD;  Location: [facility] PAIN T;  Service: Pain Management;  Laterality: Right;  [phone]    TRANSFORAMINAL EPIDURAL INJECTION OF STEROID Right 2/5/2024    Procedure: LUMBAR TRANSFORAMINAL RIGHT L3/4 AND L4/5;  Surgeon: Ike Valdez MD;  Location: [facility] PAIN MGT;  Service: Pain Management;  Laterality: Right;  [phone]  2 WK F/U SIGIFREDO    TUBAL LIGATION         Family History:  No family history on file.    Social History:  Social History     Socioeconomic History    Marital status: Single   Tobacco Use    Smoking status: Former     Current packs/day: 0.00     Types:  Cigarettes     Quit date:      Years since quittin.3    Smokeless tobacco: Never   Substance and Sexual Activity    Alcohol use: No    Drug use: No     Social Determinants of Health     Financial Resource Strain: Unknown (2023)    [place] [facility]    Overall Financial Resource Strain (CARDIA)     Difficulty of Paying Living Expenses: Patient declined   Food Insecurity: Unknown (2023)    [place] [facility]    Hunger Vital Sign     Worried About Running Out of Food in the Last Year: Patient declined     Ran Out of Food in the Last Year: Patient declined   Transportation Needs: Unmet Transportation Needs (2023)    [place] [facility] [facility]    PRAPARE - Transportation     Lack of Transportation (Medical): No     Lack of Transportation (Non-Medical): Yes   Stress: No Stress Concern Present (2023)    [place] [facility]    [place] [employer] [place] [employer] - Occupational Stress Questionnaire     Feeling of Stress : Not at all   Housing Stability: Unknown (2023)    [place] [facility]    Housing Stability Vital Sign     Unable to Pay for Housing in the Last Year: No     In the last 12 months, was there a time when you did not have a steady place to sleep or slept in a shelter (including now)?: No       OBJECTIVE:    BP (!) 172/79   Pulse (!) 58   Temp 97.6 °F (36.4 °C)   Wt 66.3 kg (146 lb 2.6 oz)   BMI 27.62 kg/m²     PHYSICAL EXAMINATION:    General appearance: Well appearing, in no acute distress, alert and oriented x3.  Psych:  Mood and affect appropriate.  Skin: Skin color, texture, turgor normal, no rashes or lesions, in both upper and lower body.  Head/face:  Atraumatic, normocephalic. No palpable lymph nodes  Back: Straight leg raising in the sitting position is positive to radicular pain bilaterally. Limited ROM with pain on extension and flexion. Positive facet loading  bilaterally.  Extremities: No deformities, edema, or skin discoloration. Good capillary refill.  Musculoskeletal: 4/5 strength in right ankle with plantar and dorsiflexion. 5/5 strength in left ankle with plantar and dorsiflexion. 4/5 strength with right knee flexion and extension. 5/5 strength with left knee flexion and extension. 5/5 strength in right EHL, 5/5 strength in left EHL.  Neuro: Decreased sensation to RLE.  Gait: Antalgic- ambulates without assistance.     ASSESSMENT: 74 y.o. year old female with low back and hip pain, consistent with the followin. Osteoarthritis of spine with radiculopathy, lumbar region  Procedure Order to Pain Management    HYDROmorphone (DILAUDID) 4 MG tablet      2. Spinal stenosis of lumbar region without neurogenic claudication  Procedure Order to Pain Management    HYDROmorphone (DILAUDID) 4 MG tablet        PLAN:  We discussed with the patient the assessment and recommendations. The following is the plan we agreed on:  We will switch her from oxycodone to Dilaudid 4 mg twice a day as needed for pain.    Caudal epidural steroid injection.  We reviewed the MRI and she has multilevel degenerative disease with variable degree of stenosis.  There is a broad-based disc at L4/5 and a right-sided disc protrusion at L5-S1.  I discussed with her those findings on MRI.  Return as needed.  Otherwise follow-up 2 weeks after the epidural.  Should her symptomatology persists consider spinal cord stimulator for the intractable back pain as she declines to do surgery.    The above plan and management options were discussed at length with patient. Patient is in agreement with the above and verbalized understanding.    Ike Valdez  2024

## 2024-05-13 ENCOUNTER — HOSPITAL ENCOUNTER (OUTPATIENT)
Facility: OTHER | Age: 74
Discharge: HOME | End: 2024-05-13
Attending: ANESTHESIOLOGY | Admitting: ANESTHESIOLOGY
Payer: MEDICARE

## 2024-05-13 VITALS
RESPIRATION RATE: 15 BRPM | HEART RATE: 66 BPM | DIASTOLIC BLOOD PRESSURE: 84 MMHG | OXYGEN SATURATION: 97 % | HEIGHT: 61 IN | SYSTOLIC BLOOD PRESSURE: 152 MMHG | WEIGHT: 146 LBS | TEMPERATURE: 98 F | BODY MASS INDEX: 27.56 KG/M2

## 2024-05-13 DIAGNOSIS — G89.29 CHRONIC PAIN: ICD-10-CM

## 2024-05-13 DIAGNOSIS — M54.17 LUMBOSACRAL RADICULOPATHY: Primary | ICD-10-CM

## 2024-05-13 LAB — POCT GLUCOSE: 96 MG/DL (ref 70–110)

## 2024-05-13 PROCEDURE — 63600175 PHARM REV CODE 636 W HCPCS: Performed by: ANESTHESIOLOGY

## 2024-05-13 PROCEDURE — 62323 NJX INTERLAMINAR LMBR/SAC: CPT | Performed by: ANESTHESIOLOGY

## 2024-05-13 PROCEDURE — 25000003 PHARM REV CODE 250: Performed by: STUDENT IN AN ORGANIZED HEALTH CARE EDUCATION/TRAINING PROGRAM

## 2024-05-13 PROCEDURE — 62323 NJX INTERLAMINAR LMBR/SAC: CPT | Mod: ,,, | Performed by: ANESTHESIOLOGY

## 2024-05-13 PROCEDURE — 25500020 PHARM REV CODE 255: Performed by: ANESTHESIOLOGY

## 2024-05-13 PROCEDURE — 25000003 PHARM REV CODE 250: Performed by: ANESTHESIOLOGY

## 2024-05-13 PROCEDURE — 82947 ASSAY GLUCOSE BLOOD QUANT: CPT | Performed by: ANESTHESIOLOGY

## 2024-05-13 RX ORDER — FENTANYL CITRATE 50 UG/ML
INJECTION, SOLUTION INTRAMUSCULAR; INTRAVENOUS
Status: DISCONTINUED | OUTPATIENT
Start: 2024-05-13 | End: 2024-05-13 | Stop reason: HOSPADM

## 2024-05-13 RX ORDER — LIDOCAINE HYDROCHLORIDE 10 MG/ML
INJECTION, SOLUTION EPIDURAL; INFILTRATION; INTRACAUDAL; PERINEURAL
Status: DISCONTINUED | OUTPATIENT
Start: 2024-05-13 | End: 2024-05-13 | Stop reason: HOSPADM

## 2024-05-13 RX ORDER — DEXAMETHASONE SODIUM PHOSPHATE 10 MG/ML
INJECTION INTRAMUSCULAR; INTRAVENOUS
Status: DISCONTINUED | OUTPATIENT
Start: 2024-05-13 | End: 2024-05-13 | Stop reason: HOSPADM

## 2024-05-13 RX ORDER — MIDAZOLAM HYDROCHLORIDE 1 MG/ML
INJECTION INTRAMUSCULAR; INTRAVENOUS
Status: DISCONTINUED | OUTPATIENT
Start: 2024-05-13 | End: 2024-05-13 | Stop reason: HOSPADM

## 2024-05-13 RX ORDER — LIDOCAINE HYDROCHLORIDE 20 MG/ML
INJECTION, SOLUTION INFILTRATION; PERINEURAL
Status: DISCONTINUED | OUTPATIENT
Start: 2024-05-13 | End: 2024-05-13 | Stop reason: HOSPADM

## 2024-05-13 RX ORDER — SODIUM CHLORIDE 9 MG/ML
INJECTION, SOLUTION INTRAVENOUS CONTINUOUS
Status: DISCONTINUED | OUTPATIENT
Start: 2024-05-13 | End: 2024-05-13 | Stop reason: HOSPADM

## 2024-05-13 NOTE — OP NOTE
Caudal Epidural Steroid Injection with Catheter under Fluoroscopic Guidance    The procedure, risks, benefits, and options were discussed with the patient. There are no contraindications to the procedure. The patent expressed understanding and agreed to the procedure. Informed written consent was obtained prior to the start of the procedure and can be found in the patient's chart.    PATIENT NAME: Chantal Ridley   MRN: [id number]     DATE OF PROCEDURE: 05/13/2024    PROCEDURE: Caudal Epidural Steroid Injection under Fluoroscopic Guidance    PRE-OP DIAGNOSIS: Osteoarthritis of spine with radiculopathy, lumbar region [M47.26] Lumbar radiculopathy [M54.16]    POST-OP DIAGNOSIS: Same    PHYSICIAN: Ike Valdez MD    ASSISTANTS: Dennis Molina     MEDICATIONS INJECTED: Preservative-free Decadron 10mg with 4cc of Lidocaine 1% MPF     LOCAL ANESTHETIC INJECTED: Xylocaine 2%     SEDATION: Versed 2mg and Fentanyl 25mcg                                                                                                                                                                                     Conscious sedation ordered by M.BERNADETTE. Patient re-evaluation prior to administration of conscious sedation. No changes noted in patient's status from initial evaluation. The patient's vital signs were monitored by RN and patient remained hemodynamically stable throughout the procedure.    Event Time In   Sedation Start 1434   Sedation End 1443       ESTIMATED BLOOD LOSS: None    COMPLICATIONS: None    TECHNIQUE: Time-out was performed to identify the patient and procedure to be performed. With the patient laying in a prone position, the surgical area was prepped and draped in the usual sterile fashion using ChloraPrep and a fenestrated drape. The injection site was determined under fluoroscopy guidance. Skin anesthesia was achieved by injecting Lidocaine 2% over the injection site. The sacrum and sacral cornua were then approached  with a 16 gauge, 3.5 inch epidural needle that was introduced and advanced into the sacral hiatus under fluoroscopic guidance with AP, lateral and/or contralateral oblique imaging. After the needle passed through the sacrococcygeal ligament, the needle angle was lowered and the needle was advanced 1 cm. After negative aspiration of blood or CSF, and no evidence of paraesthesias, the catheter was threaded through the needle into the epidural space using live fluoroscopy, contrast dye Omnipaque (300mg/mL) was injected to confirm placement and there was no vascular runoff. Fluoroscopic imaging in the AP and lateral views revealed a clear outline of the spinal nerve with proximal spread of agent through the caudal epidural space. Then 4 mL of the medication mixture listed above was injected slowly. Displacement of the radio opaque contrast after injection of the medication confirmed that the medication went into the area of the transforaminal spaces. The needles were removed and bleeding was nil. A sterile dressing was applied. No specimens collected. The patient tolerated the procedure well.     PRE-PROCEDURE PAIN SCORE: 10/10    POST-PROCEDURE PAIN SCORE: 10/10    The patient was monitored after the procedure in the recovery area. They were given post-procedure and discharge instructions to follow at home. The patient was discharged in a stable condition.    Dennis Molina MD     I reviewed and edited the fellow's note. I conducted my own interview and physical examination. I agree with the findings. I was present and supervising all critical portions of the procedure.

## 2024-05-13 NOTE — DISCHARGE SUMMARY
Discharge Note  Short Stay      SUMMARY     Admit Date: 5/13/2024    Attending Physician: Ike Valdez MD    Discharge Physician: Ike Valdez MD      Discharge Date: 5/13/2024 2:45 PM    Procedure(s) (LRB):  CAUDAL HERACLIO WITH CATH (N/A)    Final Diagnosis: Osteoarthritis of spine with radiculopathy, lumbar region [M47.26]    Disposition: Home or self care    Patient Instructions:   Current Discharge Medication List        CONTINUE these medications which have NOT CHANGED    Details   amitriptyline (ELAVIL) 10 MG tablet Take 1 tablet (10 mg total) by mouth nightly as needed for Insomnia.  Qty: 30 tablet, Refills: 2      diazePAM (VALIUM) 5 MG tablet Take 1 tablet (5 mg total) by mouth once. Take medication 30 minutes prior to procedure  for 1 dose  Qty: 1 tablet, Refills: 0      diclofenac sodium (SOLARAZE) 3 % gel Apply topically 2 (two) times daily.      !! estrogen, conjugated,-medroxyprogesterone 0.625-2.5mg (PREMPRO) 0.625-2.5 mg per tablet Take 1 tablet by mouth once daily.      !! estrogen, conjugated,-medroxyprogesterone 0.625-2.5mg (PREMPRO) 0.625-2.5 mg per tablet Take 1 tablet by mouth once daily.  Qty: 30 tablet, Refills: 12      fluconazole (DIFLUCAN) 150 MG Tab Take 1 tablet by mouth Now, And repeat in 48 hours.      HYDROmorphone (DILAUDID) 4 MG tablet Take 1 tablet (4 mg total) by mouth every 12 (twelve) hours as needed for Pain.  Qty: 60 tablet, Refills: 0    Comments: Quantity prescribed more than 7 day supply? Yes, quantity medically necessary  Associated Diagnoses: Osteoarthritis of spine with radiculopathy, lumbar region; Spinal stenosis of lumbar region without neurogenic claudication      hydrOXYzine pamoate (VISTARIL) 25 MG Cap Take 25 mg by mouth daily as needed.      !! irbesartan (AVAPRO) 150 MG tablet Take 150 mg by mouth every evening.      !! irbesartan (AVAPRO) 300 MG tablet Take 300 mg by mouth.      LIDOcaine (LIDODERM) 5 % Place 1 patch onto the skin once daily. Remove & Discard  patch within 12 hours or as directed by MD  Qty: 30 patch, Refills: 6      LIDOcaine-prilocaine (EMLA) cream Apply topically as needed.      oxyCODONE-acetaminophen (PERCOCET)  mg per tablet Take 1 tablet by mouth every 12 (twelve) hours as needed for Pain.  Qty: 60 tablet, Refills: 0    Comments: Quantity prescribed more than 7 day supply? Yes, quantity medically necessary  Associated Diagnoses: Lumbosacral radiculopathy; Cyst of lumbar facet joint; Osteoarthritis of spine with radiculopathy, lumbar region; Right hip pain      phenyleph-min oil-petrolatum 0.25-14-74.9 % Oint Place 1 Application rectally.      phenylephrine-cocoa butter 0.25-88.44 % Supp suppository Place 1 suppository rectally.      pregabalin (LYRICA) 75 MG capsule Take 1 capsule (75 mg total) by mouth 2 (two) times daily.  Qty: 60 capsule, Refills: 1      sitagliptan-metformin (JANUMET)  mg per tablet Take 1 tablet by mouth 2 (two) times daily with meals.      tiZANidine (ZANAFLEX) 4 MG tablet Take 4 mg by mouth every 8 (eight) hours.       !! - Potential duplicate medications found. Please discuss with provider.              Discharge Diagnosis: Osteoarthritis of spine with radiculopathy, lumbar region [M47.26]  Condition on Discharge: Stable with no complications to procedure   Diet on Discharge: Same as before.  Activity: as per instruction sheet.  Discharge to: Home with a responsible adult.  Follow up: 2-4 weeks       Please call my office or pager at [phone] if experienced any weakness or loss of sensation, fever > 101.5, pain uncontrolled with oral medications, persistent nausea/vomiting/or diarrhea, redness or drainage from the incisions, or any other worrisome concerns. If physician on call was not reached or could not communicate with our office for any reason please go to the nearest emergency department     Dennis Molina MD

## 2024-05-13 NOTE — DISCHARGE INSTRUCTIONS

## 2024-05-14 DIAGNOSIS — M71.38 CYST OF LUMBAR FACET JOINT: ICD-10-CM

## 2024-05-14 DIAGNOSIS — M47.26 OSTEOARTHRITIS OF SPINE WITH RADICULOPATHY, LUMBAR REGION: ICD-10-CM

## 2024-05-14 DIAGNOSIS — M54.17 LUMBOSACRAL RADICULOPATHY: ICD-10-CM

## 2024-05-14 DIAGNOSIS — M25.551 RIGHT HIP PAIN: ICD-10-CM

## 2024-05-14 RX ORDER — OXYCODONE AND ACETAMINOPHEN 10; 325 MG/1; MG/1
1 TABLET ORAL EVERY 12 HOURS PRN
Qty: 60 TABLET | Refills: 0 | Status: SHIPPED | OUTPATIENT
Start: 2024-05-14 | End: 2024-05-29

## 2024-05-14 NOTE — TELEPHONE ENCOUNTER
Patient requesting refill on oxycodone  Last office visit 05-01-24   shows last refill on 4-19-24  Patient does have a pain contract on file with [facility] department  Patient last UDS 11-21-23 was consistent with current therapy     CODEINE   Not Detected   Comment: INTERPRETIVE INFORMATION: Codeine, U  Positive Cutoff: 40 ng/mL  Methodology: Mass Spectrometry   MORPHINE   Not Detected   Comment: INTERPRETIVE INFORMATION:Morphine, U  Positive Cutoff: 20 ng/mL  Methodology: Mass Spectrometry   6-ACETYLMORPHINE   Not Detected   Comment: INTERPRETIVE INFORMATION:6-acetylmorphine, U  Positive Cutoff: 20 ng/mL  Methodology: Mass Spectrometry   OXYCODONE   Present   Comment: INTERPRETIVE INFORMATION:Oxycodone, U  Positive Cutoff: 40 ng/mL  Methodology: Mass Spectrometry   NOROYXCODONE   Present   Comment: INTERPRETIVE INFORMATION:Noroxycodone, U  Positive Cutoff: 100 ng/mL  Methodology: Mass Spectrometry   OXYMORPHONE   Present   Comment: INTERPRETIVE INFORMATION:Oxymorphone, U  Positive Cutoff: 40 ng/mL  Methodology: Mass Spectrometry   NOROXYMORPHONE   Present   Comment: INTERPRETIVE INFORMATION:Noroxymorphone, U  Positive Cutoff: 100 ng/mL  Methodology: Mass Spectrometry   HYDROCODONE   Not Detected   Comment: INTERPRETIVE INFORMATION:Hydrocodone, U  Positive Cutoff: 40 ng/mL  Methodology: Mass Spectrometry   NORHYDROCODONE   Not Detected   Comment: INTERPRETIVE INFORMATION:Norhydrocodone, U  Positive Cutoff: 100 ng/mL  Methodology: Mass Spectrometry   HYDROMORPHONE   Not Detected   Comment: INTERPRETIVE INFORMATION:Hydromorphone, U  Positive Cutoff: 20 ng/mL  Methodology: Mass Spectrometry   BUPRENORPHINE   Not Detected   Comment: INTERPRETIVE INFORMATION:Buprenorphine, U  Positive Cutoff: 5 ng/mL  Methodology: Mass Spectrometry   NORUBPRENORPHINE   Not Detected   Comment: INTERPRETIVE INFORMATION:Norbuprenorphine, U  Positive Cutoff: 20 ng/mL  Methodology: Mass Spectrometry   FENTANYL   Not  Detected   Comment: INTERPRETIVE INFORMATION:Fentanyl, U  Positive Cutoff: 2 ng/mL  Methodology: Mass Spectrometry   NORFENTANYL   Not Detected   Comment: INTERPRETIVE INFORMATION:Norfentanyl, U  Positive Cutoff: 2 ng/mL  Methodology: Mass Spectrometry   MEPERIDINE METABOLITE   Not Detected   Comment: INTERPRETIVE INFORMATION:Meperidine metabolite, U  Positive Cutoff: 50 ng/mL  Methodology: Mass Spectrometry   TAPENTADOL   Not Detected   Comment: INTERPRETIVE INFORMATION:Tapentadol, U  Positive Cutoff: 100 ng/mL  Methodology: Mass Spectrometry   TAPENTADOL-O-SULF   Not Detected   Comment: INTERPRETIVE INFORMATION:Tapentadol-o-Sulf, U  Positive Cutoff: 200 ng/mL  Methodology: Mass Spectrometry   METHADONE   Negative   Comment: Presumptive negative by immunoassay. Testing by mass  spectrometry is available on request.  INTERPRETIVE INFORMATION: Methadone Screen, U  Positive Cutoff: 150 ng/mL  Methodology: Immunoassay   TRAMADOL   Negative   Comment: Presumptive negative by immunoassay. Testing by mass  spectrometry is available on request.  INTERPRETIVE INFORMATION:Tramadol Screen, U  Positive Cutoff: 100 ng/mL  Methodology: Immunoassay   AMPHETAMINE   Not Detected   Comment: INTERPRETIVE INFORMATION:Amphetamine, U  Positive Cutoff: 50 ng/mL  Methodology: Mass Spectrometry   METHAMPHETAMINE   Not Detected   Comment: INTERPRETIVE INFORMATION:Methamphetamine, U  Positive Cutoff: 200 ng/mL  Methodology: Mass Spectrometry   MDMA- ECSTASY   Not Detected   Comment: INTERPRETIVE INFORMATION:MDMA, U  Positive Cutoff: 200 ng/mL  Methodology: Mass Spectrometry   MDA   Not Detected   Comment: INTERPRETIVE INFORMATION:MDA, U  Positive Cutoff: 200 ng/mL  Methodology: Mass Spectrometry   MDEA- [place]net[place]   Not Detected   Comment: INTERPRETIVE INFORMATION:MDEA, U  Positive Cutoff: 200 ng/mL  Methodology: Mass Spectrometry   METHYLPHENIDATE   Not Detected   Comment: INTERPRETIVE INFORMATION:Methylphenidate, U  Positive Cutoff: 100  ng/mL  Methodology: Mass Spectrometry   PHENTERMINE   Not Detected   Comment: INTERPRETIVE INFORMATION:Phentermine, U  Positive Cutoff: 100 ng/mL  Methodology: Mass Spectrometry   BENZOYLECGONINE   Negative   Comment: Presumptive negative by immunoassay. Testing by mass  spectrometry is available on request.  INTERPRETIVE INFORMATION:Cocaine Screen, U  Positive Cutoff: 150 ng/mL  Methodology: Immunoassay   ALPRAZOLAM   Not Detected   Comment: INTERPRETIVE INFORMATION:Alprazolam, U  Positive Cutoff: 40 ng/mL  Methodology: Mass Spectrometry   ALPHA-OH-ALPRAZOLAM   Not Detected   Comment: INTERPRETIVE INFORMATION:Alpha-OH-Alprazolam, U  Positive Cutoff: 20 ng/mL  Methodology: Mass Spectrometry   CLONAZEPAM   Not Detected   Comment: INTERPRETIVE INFORMATION:Clonazepam, U  Positive Cutoff: 20 ng/mL  Methodology: Mass Spectrometry   7-AMINOCLONAZEPAM   Not Detected   Comment: INTERPRETIVE INFORMATION:7-Aminoclonazepam, U  Positive Cutoff: 40 ng/mL  Methodology: Mass Spectrometry   DIAZEPAM   Not Detected   Comment: INTERPRETIVE INFORMATION:Diazepam, U  Positive Cutoff: 50 ng/mL  Methodology: Mass Spectrometry   NORDIAZEPAM   Not Detected   Comment: INTERPRETIVE INFORMATION:Nordiazepam, U  Positive Cutoff: 50 ng/mL  Methodology: Mass Spectrometry   OXAZEPAM   Not Detected   Comment: INTERPRETIVE INFORMATION:Oxazepam, U  Positive Cutoff: 50 ng/mL  Methodology: Mass Spectrometry   TEMAZEPAM   Not Detected   Comment: INTERPRETIVE INFORMATION:Temazepam, U  Positive Cutoff: 50 ng/mL  Methodology: Mass Spectrometry   Lorazepam   Not Detected   Comment: INTERPRETIVE INFORMATION:Lorazepam, U  Positive Cutoff: 60 ng/mL  Methodology: Mass Spectrometry   MIDAZOLAM   Not Detected   Comment: INTERPRETIVE INFORMATION:Midazolam, U  Positive Cutoff: 20 ng/mL  Methodology: Mass Spectrometry   ZOLPIDEM   Not Detected   Comment: INTERPRETIVE INFORMATION:Zolpidem, U  Positive Cutoff: 20 ng/mL  Methodology: Mass Spectrometry   BARBITURATES    Negative   Comment: Presumptive negative by immunoassay. Testing by mass  spectrometry is available on request.  INTERPRETIVE INFORMATION:Barbiturates Screen, U  Positive Cutoff: 200 ng/mL  Methodology: Immunoassay   Creatinine, Urine 20.0 - 400.0 mg/dL 138.5   ETHYL GLUCURONIDE   Negative   Comment: Presumptive negative by immunoassay. Testing by mass  spectrometry is available on request.  INTERPRETIVE INFORMATION:Ethyl Glucuronide Screen, U  Positive Cutoff: 500 ng/mL  Methodology: Immunoassay   MARIJUANA METABOLITE   Negative   Comment: Presumptive negative by immunoassay. Testing by mass  spectrometry is available on request.  INTERPRETIVE INFORMATION: THC (Cannabinoids) Screen, U  Positive Cutoff: 50 ng/mL  Methodology: Immunoassay   PCP   Negative   Comment: Presumptive negative by immunoassay. Testing by mass  spectrometry is available on request.  INTERPRETIVE INFORMATION:Phencyclidine Screen, U  Positive Cutoff: 25 ng/mL  Methodology: Immunoassay   CARISOPRODOL   Negative   Comment: Presumptive negative by immunoassay. Testing by mass  spectrometry is available on request.  INTERPRETIVE INFORMATION: Carisoprodol Screen, U  Positive Cutoff: 100 ng/mL  Methodology: Immunoassay  The carisoprodol immunoassay has cross-reactivity to  carisoprodol and meprobamate.   Naloxone   Not Detected   Comment: INTERPRETIVE INFORMATION:Naloxone, U  Positive Cutoff: 100 ng/mL  Methodology: Mass Spectrometry   Gabapentin   Present   Comment: INTERPRETIVE INFORMATION:Gabapentin, U  Positive Cutoff: 3,000 ng/mL  Methodology: Mass Spectrometry   Pregabalin   Not Detected   Comment: INTERPRETIVE INFORMATION:Pregabalin, U  Positive Cutoff: 3,000 ng/mL  Methodology: Mass Spectrometry   Alpha-OH-Midazolam   Not Detected   Comment: INTERPRETIVE INFORMATION:Alpha-OH-Midazolam, U  Positive Cutoff: 20 ng/mL  Methodology: Mass Spectrometry   Zolpidem Metabolite   Not Detected   Comment: INTERPRETIVE INFORMATION:Zolpidem Metabolite,  U  Positive Cutoff: 100 ng/mL  Methodology: Mass Spectrometry

## 2024-05-14 NOTE — TELEPHONE ENCOUNTER
----- Message from Osmany Madden sent at 5/14/2024 11:50 AM CDT -----  Regarding: Refil Request  Who Called:KAR COBIAN [[id number]]       New Prescription or Refill : Refill      RX Name and Strength:  oxyCODONE-acetaminophen (PERCOCET)  mg per tablet         Local or Mail Order : Local               Preferred Pharmacy:[facility]      Would the patient rather a call back or a response via [facility]?no

## 2024-05-17 NOTE — PROGRESS NOTES
"[facility]      Name: Chantal Ridley  Clinic Number: [id number]    Therapy Diagnosis:   Encounter Diagnosis   Name Primary?    Decreased ROM of trunk and back Yes     Physician: Jeronimo Rodrigez MD    Visit Date: 2024  Physician Orders: PT Eval and Treat  Medical Diagnosis from Referral:   Diagnosis   M47.816 (ICD-10-CM) - Lumbar spondylosis      Evaluation Date: 2024  Authorization Period Expiration: 24  Plan of Care Expiration: 2024  Reassessment Due: 2024 (A reassessment was performed by Paola Temple PT this visit 24  Visit # / Visits authorized: 3/20 (lumbar roll on MedX)  MedX testing visit 2     Time In: 1:10 PM ( Patient with late arrival)  Time Out: 2:00 PM  Total Billable Time: 50 minutes Pt needs HHA at times  INSURANCE and OUTCOMES: Fee for Service with FOTO Outcomes 1/3     Precautions: standard, HTN, and diabetes     Pattern of pain determined: 1PEN    Subjective   Chantal reports chronic lower back and R thigh pain that is rated as severe. States that she had an epidural injection earlier this month but no change in pain symptoms yet.     Patient reports tolerating previous visit: Min soreness  Patient reports their pain to be 8-9/10 on a 0-10 scale with 0 being no pain and 10 being the worst pain imaginable.  Pain Location: LB B\  Social History:  lives alone in shotgun home, has hand rails and 3 steps into house  Occupation: retired  Leisure: visit with friends   Pt goals: " decrease pain"     Objective     Baseline IM Testing Results:   Date of testin24  ROM 12=30 deg   Max Peak Torque 92    Min Peak Torque 69    Flex/Ext Ratio 1.3:1   % below normative data 23     OUTCOMES SELECTION:   Intake Outcome Measure for FOTO Lumbar Survey     Therapist reviewed FOTO scores for Chantal Ridley on 2024.   FOTO documents entered into EPIC - see Media section.     Intake Score: 13% functional ability  Goal Score: 30% " functional ability         Treatment    Chantal received the treatments listed below:      Chantal received neuromuscular education  to isolate and engage spinal stabilization musculature correctly for motor control and coordination to aid in function and posture for 10 minutes on the Medical Medx Machine.  Patient performed MedX dynamic exercise with emphasis on spinal muscular control using pacer throughout  active range of motion. Therapist assisted patient in achieving optimal exertion for neural reeducation and endurance training by using the  Nandini Exertion Rating scale, by instructing the patient to aim for mid range of exertion, performing 15-20 repetitions, slowly, correctly,and safely.           5/21/2024     2:20 PM   [facility] - Short   Visit Number 3   VAS Pain Rating 9   Lumbar Stretches - Slouch 10   Flexion in Lying 10   Lumbar Weight 35 lbs   Repetitions 20   Rating of Perceived Exertion 3        therapeutic exercises to develop strength, endurance, ROM, flexibility, posture, and core stabilization for 40 minutes including:    LTR 10x  BKTC c/ ball  PPT 10x--NP  + TrA activation with T-ball x 10  +Bridging x 10 (minimal lift off)  BKFO c/YTB 10x  +Long axis distraction R LE  + SOC x 10  She demonstrates some unsteadiness and an antalgic gait on R in clinic therefore, she was instructed in gait training in clinic with SC on (L) with SBA/Supervision and cues for sequence and technique    Peripheral muscle strengthening which included 1 set of 15-20 repetitions at a slow, controlled 10-13 second per rep pace focused on strengthening supporting musculature for improved body mechanics and functional mobility.  Pt and therapist focused on proper form during treatment to ensure optimal strengthening of each targeted muscle group.  Machines were utilized including torso rotation, leg press, hip abd and hip add, leg ext.  Leg curl, triceps, biceps, chest and row added visit 3    cold pack for 5 minutes  to LB.    Home Exercises Provided and Patient Education Provided   Home exercises include:LTR  Cardio program:5/16/24  Lifting education date:TBD [facility] [place] Discharge handout (date given):  Equipment at home/gym membership: no      Education provided:   -  cues w/exs  - MedX performance  - Precor ex performance  - HB Protocol    Written Home Exercises Provided: Patient instructed to cont prior HEP.  Exercises were reviewed and Chantal was able to demonstrate them prior to the end of the session.  Chantal demonstrated fair understanding of the education provided.     See EMR under Patient Instructions for exercises provided prior visit.    Assessment   Chantal presents to her third second healthy back visit reporting chronic back and R thigh pain that is rated as severe. States that she hasn't had much change in symptoms with her recent epidural injection. She arrives 10 minutes late seated in transport chair.  Treatment continued with flexibility, strengthening and neuromuscular reeducation ex's.  Added TrA activation with T-ball, bridging and SOC this visit . She required verbal and tactile cues for ex performance but was able to perform without increased symptoms.  She demonstrates some unsteadiness and an antalgic gait on R in clinic therefore, she was instructed in gait training in clinic with SC on (L) with SBA/Supervision and cues for sequence and technique but better gait tolerance per subjective report. Lumbar MedX resistance was initiated at 35 ft/lbs and she completed 20 reps with a RPE = 3/10 (lumbar Roll used on MedX for comfort) Cues required for pacing, full ROM and extension hold. She completed peripheral strengthening ex's to include: torso rotation, leg press, hip abd/add, leg ext and leg curl without increased symptoms. Will continue per HB protocol and patient tolerance.    Patient is making fair progress towards established goals.  Pt will continue to benefit from skilled outpatient physical  therapy to address the deficits stated in the impairment chart, provide pt/family education and to maximize pt's level of independence in the home and community environment.     Anticipated Barriers for therapy: transportation/pain level   Pt's spiritual, cultural and educational needs considered and pt agreeable to plan of care and goals as stated below:       GOALS: Pt is in agreement with the following goals.     Short term goals:  6 weeks or 10 visits   - Pt will demonstrate increased lumbar MedX ROM by at least 3 degrees from the initial ROM value with improvements noted in functional ROM and ability to perform ADLs. Appropriate and Ongoing  - Pt will demonstrate increased MedX average isometric strength value by 20% from initial test resulting in improved ability to perform bending, lifting, and carrying activities safely, confidently. Appropriate and Ongoing  - Pt will report a reduction in worst pain score by 1-2 points for improved tolerance for standing. Appropriate and Ongoing  - Pt able to perform HEP correctly with minimal cueing or supervision from therapist to encourage independent management of symptoms. Appropriate and Ongoing     Long term goals: 10 weeks or 20 visits   - Pt will demonstrate increased lumbar MedX ROM by at least 9 degrees from initial ROM value, resulting in improved ability to perform functional forward bending while standing and sitting. Appropriate and Ongoing  - Pt will demonstrate increased MedX average isometric strength value by 40% from initial test resulting in improved ability to perform bending, lifting, and carrying activities safely and confidently. Appropriate and Ongoing  - Pt to demonstrate ability to independently control and reduce their pain through posture positioning and mechanical movements throughout a typical day. Appropriate and Ongoing  - Pt will demonstrate reduced pain and improved functional outcomes as reported on the FOTO by reaching an intake score  of >/= 30% functional ability in order to demonstrate subjective improvement in patient's condition. . Appropriate and Ongoing  - Pt will demonstrate independence with the HEP at discharge. Appropriate and Ongoing  - Pt(patient goal)will be able to walk without AD > 5 minutes without sitting  Appropriate and Ongoing    Plan   Continue with established Plan of Care towards established PT goals.     Therapist: Alfa Betancourt, PTA  5/17/2024

## 2024-05-21 ENCOUNTER — CLINICAL SUPPORT (OUTPATIENT)
Dept: REHABILITATION | Facility: HOSPITAL | Age: 74
End: 2024-05-21
Payer: MEDICARE

## 2024-05-21 DIAGNOSIS — M25.69 DECREASED ROM OF TRUNK AND BACK: Primary | ICD-10-CM

## 2024-05-21 PROCEDURE — 97110 THERAPEUTIC EXERCISES: CPT | Mod: CQ

## 2024-05-21 PROCEDURE — 97112 NEUROMUSCULAR REEDUCATION: CPT | Mod: CQ

## 2024-05-23 ENCOUNTER — CLINICAL SUPPORT (OUTPATIENT)
Dept: REHABILITATION | Facility: HOSPITAL | Age: 74
End: 2024-05-23
Payer: MEDICARE

## 2024-05-23 DIAGNOSIS — M25.69 DECREASED ROM OF TRUNK AND BACK: Primary | ICD-10-CM

## 2024-05-23 PROCEDURE — 97110 THERAPEUTIC EXERCISES: CPT

## 2024-05-23 PROCEDURE — 97112 NEUROMUSCULAR REEDUCATION: CPT

## 2024-05-27 ENCOUNTER — OFFICE VISIT (OUTPATIENT)
Dept: PAIN MEDICINE | Facility: CLINIC | Age: 74
End: 2024-05-27
Payer: MEDICARE

## 2024-05-27 VITALS
WEIGHT: 143.5 LBS | HEART RATE: 73 BPM | DIASTOLIC BLOOD PRESSURE: 84 MMHG | BODY MASS INDEX: 27.09 KG/M2 | SYSTOLIC BLOOD PRESSURE: 176 MMHG | HEIGHT: 61 IN

## 2024-05-27 DIAGNOSIS — M48.061 SPINAL STENOSIS OF LUMBAR REGION WITHOUT NEUROGENIC CLAUDICATION: ICD-10-CM

## 2024-05-27 DIAGNOSIS — G89.4 CHRONIC PAIN DISORDER: ICD-10-CM

## 2024-05-27 DIAGNOSIS — M54.17 LUMBOSACRAL RADICULOPATHY: Primary | ICD-10-CM

## 2024-05-27 DIAGNOSIS — M51.36 DDD (DEGENERATIVE DISC DISEASE), LUMBAR: ICD-10-CM

## 2024-05-27 DIAGNOSIS — G89.4 CHRONIC PAIN SYNDROME: ICD-10-CM

## 2024-05-27 DIAGNOSIS — M47.816 LUMBAR SPONDYLOSIS: ICD-10-CM

## 2024-05-27 PROCEDURE — 99999 PR PBB SHADOW E&M-EST. PATIENT-LVL IV: CPT | Mod: PBBFAC,,,

## 2024-05-27 PROCEDURE — 99214 OFFICE O/P EST MOD 30 MIN: CPT | Mod: S$PBB,,,

## 2024-05-27 PROCEDURE — 99214 OFFICE O/P EST MOD 30 MIN: CPT | Mod: PBBFAC

## 2024-05-27 NOTE — PROGRESS NOTES
Chronic patient Established Note (Follow up visit)      SUBJECTIVE:    Interval History 5/27/2024:  Chantal Ridley returns to clinic s/p Caudal HERACLIO with catheter on 5/13/2024.  She reports limited relief from this procedure. She is currently in the [facility] program twice weekly.  She is ready to proceed with SCS trial. She reports Dilaudid 4 mg q12 hours prn and Percocet 10/325 BID PRN is not helping. She states the medications only last about 4 hours.  She states she has a high tolerance for pain medication. She is hoping to increase her pain medications today.  She does not wish to revisit surgical interventions. The patient denies fever/night sweats, urinary incontinence, bowel incontinence, significant weight changes, significant motor weakness or changes, or loss of sensations. Her pain today is 10/10.    Interval History 4/9/2024:  The patient is here for follow up of back pain. The pain continues to radiate down the back of the right leg. She had no benefit with HERACLIO in the past. Since previous encounter, she did f/u with Dr. Rodrigez and she did not wish to pursue surgical options at this time. He referred her to Ortho to have her right hip checked. She saw Dr. Faye who does not think that her hip is the issue. He believes that her primary cause is her back, which I agree with. She was given a Toradol shot last month by podiatry which she says did not help her at all for any area of pain. She is starting [facility] on 4/18/24. She is taking Percocet 10/325 mg BID which she says does not help very much. She is asking about ITP opioid pain medications. Her pain today is 10/10.    Interval History 2/19/2024:  The patient is here for follow up of right sided back and leg pain. She is s/p right L3/4 and L4/5 TF HERACLIO with no relief, not even short term. She actually feels like it worsened her pain. Leg pain is greater than back pain. She continues to have right sided back pain with radiation down the front  of the right leg. She has associated numbness and weakness. She does have some symptoms on the left, but the right side is the worse. She did see Dr. Rodrigez last year who discussed surgery if epidural did not help. [place] She continues to take Percocet BID with mild benefit. She stopped Gabapentin due to minimal benefit. She is requesting Naproxen as she feels like it was helpful in the past. Her pain today is 10/10.    Interval History 1/16/2024:  Chantal Ridley presents to the clinic for a follow-up appointment for hip pain. She is s/p right hip joint injection on 12/18/2023. She reports relief for 2 weeks. Then, her pain returned to baseline. She continues to report low back pain that radiates into the anterolateral aspect of her right thigh to her knee. She denies any left leg pain. She does have right hip pain. Her pain is worse with standing and walking. She also reports pain with moving from sitting to standing. She is currently taking Percocet as needed with benefit. She denies any adverse effects. She denies any other health changes. Her pain today is 10/10.    HPI  A former patient of Dr. Garland who was on Percocet for chronic pain.  She was given 7.5 to use twice a day.  They had recommended interventions but she declined.  She is interested in interventions if they would help.  She has chronic right lower back pain that radiates to the right groin and thigh.  It also extends this down to the foot anterior and dorsally.  There is sometimes tingling but no numbness.  She does not appreciate weakness.  She is stiff when she gets up and takes her a while to get moving.  There is no imaging of her hip but she thinks she had 1 at [facility].  She had therapy in the remote past and has not been following with the exercises.  She is interested in getting back into therapy.    Pain Disability Index Review:      5/27/2024     1:34 PM 5/1/2024      1:57 PM 4/9/2024     1:57 PM   Last 3 PDI Scores   Pain Disability Index (PDI) 60 70 70       Pain Medications:  Percocet 10/325 q12 hrs prn  Dilaudid 4 mg q12 hrs pen    Opioid Contract: yes     report:  Reviewed and consistent with medication use as prescribed.    Pain Procedures:   12/18/2023- Right hip joint injection  2/5/24 Right L3/4 and L4/5 TF HERACLIO- no relief  5/13/2024 - Caudal HERACLIO with cath - limited relief    Physical Therapy/Home Exercise: yes    Imaging:   Xray Hips 11/21/2023:  CLINICAL HISTORY:  Pain in right hip     FINDINGS:  Two views right hip: There is severe DJD and impingement change.  No fracture dislocation bone destruction seen.    MRI Lumbar Spine 6/21/2023:  COMPARISON:  X-ray 05/30/2023     FINDINGS:  Lumbar spine alignment is within normal limits. The vertebral body heights are well maintained, with no fracture.  Degenerative endplate sclerosis is seen at L2-3.  No marrow signal abnormality to suggest infiltrative process.     The conus is normal in appearance.  The adjacent soft tissue structures show no significant abnormalities.     L1-L2: Circumferential disc bulge and spondylitic spurring.No significant central canal or neural foraminal narrowing.     L2-L3: Circumferential disc bulge, spondylitic spurring, ligamentum flavum thickening and facet arthropathy.  Moderate central spinal stenosis and severe right-sided foraminal stenosis.     L3-L4: Circumferential disc bulge, spondylitic spurring and facet arthropathy.  Posteriorly projecting synovial cyst arising from the right facet.  Severe central spinal stenosis.  No foraminal stenosis.     L4-L5: Circumferential disc bulge, spondylitic spurring and severe facet arthropathy.  Moderate central canal stenosis and no significant foraminal stenosis.     L5-S1: Circumferential disc bulge, spondylitic spurring and superimposed right paracentral and medial foraminal disc protrusion.  No central canal stenosis.  Moderate left-sided  foraminal stenosis.     Impression:     Multilevel extradural degenerative change producing moderate to severe spinal stenosis and areas of foraminal stenosis, most pronounced at L3-4 centrally.    Allergies:   Review of patient's allergies indicates:   Allergen Reactions    Ace inhibitors Anaphylaxis, Itching and Swelling    Amlodipine Swelling    Amoxicillin Anaphylaxis    Erythromycin Swelling and Hives     Tongue swelling    Penicillins Anaphylaxis    Losartan Hives     Headaches. Patient is currently taking Losartan for high b/p and states they are feeling fine.    Tramadol Itching    Olmesartan Rash     Other reaction(s): Skin Rashes / Eruption of skin, Benicar       Current Medications:   Current Outpatient Medications   Medication Sig Dispense Refill    amitriptyline (ELAVIL) 10 MG tablet Take 1 tablet (10 mg total) by mouth nightly as needed for Insomnia. 30 tablet 2    diazePAM (VALIUM) 5 MG tablet Take 1 tablet (5 mg total) by mouth once. Take medication 30 minutes prior to procedure  for 1 dose 1 tablet 0    diclofenac sodium (SOLARAZE) 3 % gel Apply topically 2 (two) times daily.      estrogen, conjugated,-medroxyprogesterone 0.625-2.5mg (PREMPRO) 0.625-2.5 mg per tablet Take 1 tablet by mouth once daily.      estrogen, conjugated,-medroxyprogesterone 0.625-2.5mg (PREMPRO) 0.625-2.5 mg per tablet Take 1 tablet by mouth once daily. 30 tablet 12    fluconazole (DIFLUCAN) 150 MG Tab Take 1 tablet by mouth Now, And repeat in 48 hours.      HYDROmorphone (DILAUDID) 4 MG tablet Take 1 tablet (4 mg total) by mouth every 12 (twelve) hours as needed for Pain. 60 tablet 0    hydrOXYzine pamoate (VISTARIL) 25 MG Cap Take 25 mg by mouth daily as needed.      irbesartan (AVAPRO) 150 MG tablet Take 150 mg by mouth every evening.      irbesartan (AVAPRO) 300 MG tablet Take 300 mg by mouth.      LIDOcaine (LIDODERM) 5 % Place 1 patch onto the skin once daily. Remove & Discard patch within 12 hours or as directed by MD  30 patch 6    LIDOcaine-prilocaine (EMLA) cream Apply topically as needed.      oxyCODONE-acetaminophen (PERCOCET)  mg per tablet Take 1 tablet by mouth every 12 (twelve) hours as needed for Pain. 60 tablet 0    phenyleph-min oil-petrolatum 0.25-14-74.9 % Oint Place 1 Application rectally.      phenylephrine-cocoa butter 0.25-88.44 % Supp suppository Place 1 suppository rectally.      pregabalin (LYRICA) 75 MG capsule Take 1 capsule (75 mg total) by mouth 2 (two) times daily. 60 capsule 1    sitagliptan-metformin (JANUMET)  mg per tablet Take 1 tablet by mouth 2 (two) times daily with meals.      tiZANidine (ZANAFLEX) 4 MG tablet Take 4 mg by mouth every 8 (eight) hours.       No current facility-administered medications for this visit.     Facility-Administered Medications Ordered in Other Visits   Medication Dose Route Frequency Provider Last Rate Last Admin    0.9%  NaCl infusion   Intravenous Continuous Chris Farmer MD           REVIEW OF SYSTEMS:    GENERAL:  No weight loss, malaise or fevers.  HEENT:  Negative for frequent or significant headaches.  NECK:  Negative for lumps, goiter, pain and significant neck swelling.  RESPIRATORY:  Negative for cough, wheezing or shortness of breath.  CARDIOVASCULAR:  Negative for chest pain, leg swelling or palpitations. HTN  GI:  Negative for abdominal discomfort, blood in stools or black stools or change in bowel habits.  MUSCULOSKELETAL:  See HPI.  SKIN:  Negative for lesions, rash, and itching.  PSYCH:  Negative for sleep disturbance, mood disorder and recent psychosocial stressors.  ENDO: Diabetes  HEMATOLOGY/LYMPHOLOGY:  Negative for prolonged bleeding, bruising easily or swollen nodes.  NEURO:   No history of headaches, syncope, paralysis, seizures or tremors.  All other reviewed and negative other than HPI.    Past Medical History:  Past Medical History:   Diagnosis Date    Diabetes mellitus     Fibromyalgia     Hx of degenerative disc disease      neck & back    Hypertension        Past Surgical History:  Past Surgical History:   Procedure Laterality Date    CATARACT EXTRACTION W/  INTRAOCULAR LENS IMPLANT  2016    EPIDURAL STEROID INJECTION N/A 2024    Procedure: CAUDAL HERACLIO WITH CATH;  Surgeon: Ike Valdez MD;  Location: [facility] PAIN MGT;  Service: Pain Management;  Laterality: N/A;  [phone]  2 WK F/U MICHAEL    INJECTION OF JOINT Right 2023    Procedure: INJECTION, JOINT RIGHT HIP;  Surgeon: Ike Valdez MD;  Location: [facility] PAIN MGT;  Service: Pain Management;  Laterality: Right;  [phone]    TRANSFORAMINAL EPIDURAL INJECTION OF STEROID Right 2024    Procedure: LUMBAR TRANSFORAMINAL RIGHT L3/4 AND L4/5;  Surgeon: Ike Valdez MD;  Location: [facility] PAIN MGT;  Service: Pain Management;  Laterality: Right;  [phone]  2 WK F/U SIGIFREDO    TUBAL LIGATION         Family History:  No family history on file.    Social History:  Social History     Socioeconomic History    Marital status: Single   Tobacco Use    Smoking status: Former     Current packs/day: 0.00     Types: Cigarettes     Quit date:      Years since quittin.4    Smokeless tobacco: Never   Substance and Sexual Activity    Alcohol use: No    Drug use: No     Social Determinants of Health     Financial Resource Strain: Unknown (2023)    [facility]    Overall Financial Resource Strain (CARDIA)     Difficulty of Paying Living Expenses: Patient declined   Food Insecurity: Unknown (2023)    Received from [facility]    Hunger Vital Sign     Worried About Running Out of Food in the Last Year: Patient declined     Ran Out of Food in the Last Year: Patient declined   Transportation Needs: Unmet Transportation Needs (2023)    Received from [facility]    PRAPARE - Transportation     Lack of Transportation (Medical): No     Lack of Transportation (Non-Medical): Yes   Stress: No Stress Concern Present (2023)     "[facility]    [employer] [facility] [employer] [facility] - Occupational Stress Questionnaire     Feeling of Stress : Not at all   Housing Stability: Unknown (2023)    [facility]    Housing Stability Vital Sign     Unable to Pay for Housing in the Last Year: No     In the last 12 months, was there a time when you did not have a steady place to sleep or slept in a shelter (including now)?: No       OBJECTIVE:    Vitals:    24 1334   BP: (!) 176/84   Pulse: 73   Weight: 65.1 kg (143 lb 8.3 oz)   Height: 5' 1" (1.549 m)   PainSc: 10-Worst pain ever        PHYSICAL EXAMINATION:    General appearance: Well appearing, in no acute distress, alert and oriented x3.  Psych:  Mood and affect appropriate.  Skin: Skin color, texture, turgor normal, no rashes or lesions, in both upper and lower body.  Head/face:  Atraumatic, normocephalic. No palpable lymph nodes  Back: Straight leg raising in the sitting position is negative to radicular pain bilaterally. Limited ROM with pain on extension and flexion. Positive facet loading bilaterally.  Extremities: No deformities, edema, or skin discoloration. Good capillary refill.  Musculoskeletal: Bilateral upper and lower extremity strength is normal and symmetric.  No atrophy or tone abnormalities are noted.  Neuro: Decreased sensation to RLE.  Gait: Antalgic- ambulates with cane.     ASSESSMENT: 74 y.o. year old female with low back and hip pain, consistent with the followin. Lumbosacral radiculopathy  Ambulatory referral/consult to Psychology      2. Chronic pain syndrome        3. Spinal stenosis of lumbar region without neurogenic claudication        4. DDD (degenerative disc disease), lumbar        5. Lumbar spondylosis        6. Chronic pain disorder  Ambulatory referral/consult to Psychology          PLAN:  We discussed with the patient the assessment and recommendations. The following is the plan we agreed " on:  Previous imaging was reviewed and discussed with the patient today.   She is s/p Caudal epidural steroid injection with limited relief.  She is taking Percocet 10/325 BID PRN and Dilaudid 4 mg BID PRN. She can call for refills.   UDS 11/21/2023 reviewed and appropriate for Percocet usage.  Discussed with patient that I am unable to change her pain medications. She will need to meet with and discuss medication management with Dr Valdez face-to-face or virtually. She also inquires about a pain pump if SCS trial is not successful.  We discussed revisiting ortho as she is a surgical candidate.   She would like to proceed with SCS Trial.  Referral to Dr Giron for psych evaluation placed today.   Return to clinic after psych evaluation to discuss SCS Trial if appropriate.   RTC after completion of psych evaluation and/or to follow-up with MD regarding medication management. Offered appointment this week, but appointment times did not work for patient.  She is scheduled for 6/26/2024 with Dr Valdez.     The above plan and management options were discussed at length with patient. Patient is in agreement with the above and verbalized understanding.    Ayse Moreno NP   05/27/2024

## 2024-05-28 ENCOUNTER — TELEPHONE (OUTPATIENT)
Dept: PAIN MEDICINE | Facility: CLINIC | Age: 74
End: 2024-05-28
Payer: MEDICARE

## 2024-05-28 DIAGNOSIS — M47.26 OSTEOARTHRITIS OF SPINE WITH RADICULOPATHY, LUMBAR REGION: ICD-10-CM

## 2024-05-28 DIAGNOSIS — M48.061 SPINAL STENOSIS OF LUMBAR REGION WITHOUT NEUROGENIC CLAUDICATION: ICD-10-CM

## 2024-05-28 RX ORDER — HYDROMORPHONE HYDROCHLORIDE 4 MG/1
4 TABLET ORAL EVERY 12 HOURS PRN
Qty: 60 TABLET | Refills: 0 | OUTPATIENT
Start: 2024-05-28

## 2024-05-28 NOTE — PROGRESS NOTES
"[facility] Treatment      Name: Chantal Ridley  Clinic Number: [id number]    Therapy Diagnosis:   Encounter Diagnosis   Name Primary?    Decreased ROM of trunk and back Yes       Physician: Jeronimo Rodrigez MD    Visit Date: 2024  Physician Orders: PT Eval and Treat  Medical Diagnosis from Referral:   Diagnosis   M47.816 (ICD-10-CM) - Lumbar spondylosis      Evaluation Date: 2024  Authorization Period Expiration: 24  Plan of Care Expiration: 2024  Reassessment Due: 2024  Visit # / Visits authorized:  (lumbar roll on MedX)  MedX testing visit 2     Time In: 2:00 PM  Time Out: 3:07 PM  Total Billable Time: 60 min 1:1  INSURANCE and OUTCOMES: Fee for Service with FOTO Outcomes 1/3     Precautions: standard, HTN, and diabetes     Pattern of pain determined: 1PEN    Subjective   Chantal reports chronic lower back and R thigh pain that is rated as high.  She doesn't think she is any better. She goes to bed at 12 monika, wakes up at 3 to watch tv and then sleeps until 11.  Spends most of time on couch if not going to appointments.  She goes to a bar on , but hasn't gone in weeks (uses transportation to get there).  We discussed walking in house more and getting out more. She hasn't been doing her exercises so we printed them again and reviewed them    Patient reports tolerating previous visit: Min soreness  Patient reports their pain to be 8-9/10 on a 0-10 scale with 0 being no pain and 10 being the worst pain imaginable.  Pain Location: LB B\  Social History:  lives alone in shotgun home, has hand rails and 3 steps into house  Occupation: retired  Leisure: visit with friends   Pt goals: " decrease pain"     Objective     Baseline IM Testing Results:   Date of testin24  ROM 12=30 deg   Max Peak Torque 92    Min Peak Torque 69    Flex/Ext Ratio 1.3:1   % below normative data 23     OUTCOMES SELECTION:   Intake Outcome Measure for FOTO Lumbar Survey   "   Therapist reviewed FOTO scores for Chantal Ridley on 4/18/2024.   FOTO documents entered into [employer] - see Media section.     Intake Score: 13% functional ability  Goal Score: 30% functional ability         Treatment    Chantal received the treatments listed below:      Chantal received neuromuscular education  to isolate and engage spinal stabilization musculature correctly for motor control and coordination to aid in function and posture for 10 minutes on the Medical Medx Machine.  Patient performed MedX dynamic exercise with emphasis on spinal muscular control using pacer throughout  active range of motion. Therapist assisted patient in achieving optimal exertion for neural reeducation and endurance training by using the  Nandini Exertion Rating scale, by instructing the patient to aim for mid range of exertion, performing 15-20 repetitions, slowly, correctly,and safely.             5/30/2024     2:38 PM   [facility]   Visit Number 5   VAS Pain Rating 6   Time 5   Lumbar Stretches - Slouch Overcorrection 10   Flexion in Lying 10   Lumbar Weight 38 lbs   Repetitions 20   Rating of Perceived Exertion 4   Ice - Z Lie (in min.) 5          therapeutic exercises to develop strength, endurance, ROM, flexibility, posture, and core stabilization for 40 minutes including:    LTR 10x  BKTC c/ ball  PPT 10x  TrA activation with T-ball x 10  Bridging x 10 (minimal lift off)  BKFO c/YTB 10x  +Long axis distraction R LE  + SOC x 10    Peripheral muscle strengthening which included 1 set of 15-20 repetitions at a slow, controlled 10-13 second per rep pace focused on strengthening supporting musculature for improved body mechanics and functional mobility.  Pt and therapist focused on proper form during treatment to ensure optimal strengthening of each targeted muscle group.  She was able to complete full circuit of peripheral machines today with increased time.    cold pack for 5 minutes to LB.    Home Exercises Provided  and Patient Education Provided   Home exercises include:LTR, bridge, laq, ball squeeze, walking  Cardio program:5/16/24 and encouraged walking in home 5/30/24  Lifting education date:TBD [facility] [place] Discharge handout (date given):  Equipment at home/gym membership: no      Education provided:   -  cues w/exs  - MedX performance  - Precor ex performance  - HB Protocol  -walking more in home, moving    Written Home Exercises Provided: Patient instructed to cont prior HEP.  Exercises were reviewed and Chantal was able to demonstrate them prior to the end of the session.  Chantal demonstrated fair understanding of the education provided.     See EMR under Patient Instructions for exercises provided 5/30/24    Assessment   Chantal presents to her  healthy back visit reporting chronic back and R thigh pain that is rated as very high.  She did note she felt better after the visit.  She also reports stationary at home and we encouraged walking in home.  Treatment continued with flexibility, strengthening and neuromuscular reeducation ex's.  Had to do ball squeeze for hip add today and long arc quads for quads due to difficulty on machines.  Lumbar MedX resistance maintained at 35 ft/lbs. She completed 20 reps with a RPE =4/10 (lumbar Roll used on MedX for comfort) Cues required for pacing, full ROM and extension hold. She completed all of the peripheral strengthening ex's to including : torso rotation, leg press, hip abd/add, leg extension, leg curl, triceps, biceps, row and chest press with modifications as needed and increased time to complete.. Will continue per HB protocol and patient tolerance.  Encouraged more movement at home.    Patient is making fair progress towards established goals.  Pt will continue to benefit from skilled outpatient physical therapy to address the deficits stated in the impairment chart, provide pt/family education and to maximize pt's level of independence in the home and community environment.      Anticipated Barriers for therapy: transportation/pain level   Pt's spiritual, cultural and educational needs considered and pt agreeable to plan of care and goals as stated below:       GOALS: Pt is in agreement with the following goals.     Short term goals:  6 weeks or 10 visits   - Pt will demonstrate increased lumbar MedX ROM by at least 3 degrees from the initial ROM value with improvements noted in functional ROM and ability to perform ADLs. Appropriate and Ongoing  - Pt will demonstrate increased MedX average isometric strength value by 20% from initial test resulting in improved ability to perform bending, lifting, and carrying activities safely, confidently. Appropriate and Ongoing  - Pt will report a reduction in worst pain score by 1-2 points for improved tolerance for standing. Appropriate and Ongoing  - Pt able to perform HEP correctly with minimal cueing or supervision from therapist to encourage independent management of symptoms. Appropriate and Ongoing     Long term goals: 10 weeks or 20 visits   - Pt will demonstrate increased lumbar MedX ROM by at least 9 degrees from initial ROM value, resulting in improved ability to perform functional forward bending while standing and sitting. Appropriate and Ongoing  - Pt will demonstrate increased MedX average isometric strength value by 40% from initial test resulting in improved ability to perform bending, lifting, and carrying activities safely and confidently. Appropriate and Ongoing  - Pt to demonstrate ability to independently control and reduce their pain through posture positioning and mechanical movements throughout a typical day. Appropriate and Ongoing  - Pt will demonstrate reduced pain and improved functional outcomes as reported on the FOTO by reaching an intake score of >/= 30% functional ability in order to demonstrate subjective improvement in patient's condition. . Appropriate and Ongoing  - Pt will demonstrate independence with the HEP  at discharge. Appropriate and Ongoing  - Pt(patient goal)will be able to walk without AD > 5 minutes without sitting  Appropriate and Ongoing    Plan   Continue with established Plan of Care towards established PT goals.     Therapist: Laura Diaz, PT  5/30/2024

## 2024-05-28 NOTE — TELEPHONE ENCOUNTER
----- Message from Belinda Jaramillo sent at 5/27/2024  3:50 PM CDT -----  Regarding: HYDROmorphone (DILAUDID) 4 MG tablet  Type:  RX Refill Request    Who Called: quin    Refill or New Rx:refill    RX Name and Strength:HYDROmorphone (DILAUDID) 4 MG tablet    How is the patient currently taking it? (ex. 1XDay):    Is this a 30 day or 90 day RX:    Preferred Pharmacy with phone number:[facility]   Phone: [phone]  Fax: [phone]          Local or Mail Order:local    Ordering Provider:    Would the patient rather a call back or a response via [facility]? call    Best Call Back Number:[phone]    Additional Information:

## 2024-05-28 NOTE — TELEPHONE ENCOUNTER
Staff spoke with patient to get her seen sooner to discuss meds with Dr. Valdez. Patient is in agreement to the above and verbalized understanding.

## 2024-05-28 NOTE — TELEPHONE ENCOUNTER
Patient requesting refill on HYDROmorphone (DILAUDID) 4 MG tablet   Last office visit 5/27/24   shows last refill on 5/1/24  Patient does have a pain contract on file with [facility]  Patient last UDS 11/21/23 was consistent with current therapy    CODEINE  Not Detected   Comment: INTERPRETIVE INFORMATION: Codeine, U  Positive Cutoff: 40 ng/mL  Methodology: Mass Spectrometry   MORPHINE  Not Detected   Comment: INTERPRETIVE INFORMATION:Morphine, U  Positive Cutoff: 20 ng/mL  Methodology: Mass Spectrometry   6-ACETYLMORPHINE  Not Detected   Comment: INTERPRETIVE INFORMATION:6-acetylmorphine, U  Positive Cutoff: 20 ng/mL  Methodology: Mass Spectrometry   OXYCODONE  Present   Comment: INTERPRETIVE INFORMATION:Oxycodone, U  Positive Cutoff: 40 ng/mL  Methodology: Mass Spectrometry   NOROYXCODONE  Present   Comment: INTERPRETIVE INFORMATION:Noroxycodone, U  Positive Cutoff: 100 ng/mL  Methodology: Mass Spectrometry   OXYMORPHONE  Present   Comment: INTERPRETIVE INFORMATION:Oxymorphone, U  Positive Cutoff: 40 ng/mL  Methodology: Mass Spectrometry   NOROXYMORPHONE  Present   Comment: INTERPRETIVE INFORMATION:Noroxymorphone, U  Positive Cutoff: 100 ng/mL  Methodology: Mass Spectrometry   HYDROCODONE  Not Detected   Comment: INTERPRETIVE INFORMATION:Hydrocodone, U  Positive Cutoff: 40 ng/mL  Methodology: Mass Spectrometry   NORHYDROCODONE  Not Detected   Comment: INTERPRETIVE INFORMATION:Norhydrocodone, U  Positive Cutoff: 100 ng/mL  Methodology: Mass Spectrometry   HYDROMORPHONE  Not Detected   Comment: INTERPRETIVE INFORMATION:Hydromorphone, U  Positive Cutoff: 20 ng/mL  Methodology: Mass Spectrometry   BUPRENORPHINE  Not Detected   Comment: INTERPRETIVE INFORMATION:Buprenorphine, U  Positive Cutoff: 5 ng/mL  Methodology: Mass Spectrometry   NORUBPRENORPHINE  Not Detected   Comment: INTERPRETIVE INFORMATION:Norbuprenorphine, U  Positive Cutoff: 20 ng/mL  Methodology: Mass Spectrometry    FENTANYL  Not Detected   Comment: INTERPRETIVE INFORMATION:Fentanyl, U  Positive Cutoff: 2 ng/mL  Methodology: Mass Spectrometry   NORFENTANYL  Not Detected   Comment: INTERPRETIVE INFORMATION:Norfentanyl, U  Positive Cutoff: 2 ng/mL  Methodology: Mass Spectrometry   MEPERIDINE METABOLITE  Not Detected   Comment: INTERPRETIVE INFORMATION:Meperidine metabolite, U  Positive Cutoff: 50 ng/mL  Methodology: Mass Spectrometry   TAPENTADOL  Not Detected   Comment: INTERPRETIVE INFORMATION:Tapentadol, U  Positive Cutoff: 100 ng/mL  Methodology: Mass Spectrometry   TAPENTADOL-O-SULF  Not Detected   Comment: INTERPRETIVE INFORMATION:Tapentadol-o-Sulf, U  Positive Cutoff: 200 ng/mL  Methodology: Mass Spectrometry   METHADONE  Negative   Comment: Presumptive negative by immunoassay. Testing by mass  spectrometry is available on request.  INTERPRETIVE INFORMATION: Methadone Screen, U  Positive Cutoff: 150 ng/mL  Methodology: Immunoassay   TRAMADOL  Negative   Comment: Presumptive negative by immunoassay. Testing by mass  spectrometry is available on request.  INTERPRETIVE INFORMATION:Tramadol Screen, U  Positive Cutoff: 100 ng/mL  Methodology: Immunoassay   AMPHETAMINE  Not Detected   Comment: INTERPRETIVE INFORMATION:Amphetamine, U  Positive Cutoff: 50 ng/mL  Methodology: Mass Spectrometry   METHAMPHETAMINE  Not Detected   Comment: INTERPRETIVE INFORMATION:Methamphetamine, U  Positive Cutoff: 200 ng/mL  Methodology: Mass Spectrometry   MDMA- ECSTASY  Not Detected   Comment: INTERPRETIVE INFORMATION:MDMA, U  Positive Cutoff: 200 ng/mL  Methodology: Mass Spectrometry   MDA  Not Detected   Comment: INTERPRETIVE INFORMATION:MDA, U  Positive Cutoff: 200 ng/mL  Methodology: Mass Spectrometry   MDEA- Annetta  Not Detected   Comment: INTERPRETIVE INFORMATION:MDEA, U  Positive Cutoff: 200 ng/mL  Methodology: Mass Spectrometry   METHYLPHENIDATE  Not Detected   Comment: INTERPRETIVE INFORMATION:Methylphenidate, U  Positive Cutoff: 100  ng/mL  Methodology: Mass Spectrometry   PHENTERMINE  Not Detected   Comment: INTERPRETIVE INFORMATION:Phentermine, U  Positive Cutoff: 100 ng/mL  Methodology: Mass Spectrometry   BENZOYLECGONINE  Negative   Comment: Presumptive negative by immunoassay. Testing by mass  spectrometry is available on request.  INTERPRETIVE INFORMATION:Cocaine Screen, U  Positive Cutoff: 150 ng/mL  Methodology: Immunoassay   ALPRAZOLAM  Not Detected   Comment: INTERPRETIVE INFORMATION:Alprazolam, U  Positive Cutoff: 40 ng/mL  Methodology: Mass Spectrometry   ALPHA-OH-ALPRAZOLAM  Not Detected   Comment: INTERPRETIVE INFORMATION:Alpha-OH-Alprazolam, U  Positive Cutoff: 20 ng/mL  Methodology: Mass Spectrometry   CLONAZEPAM  Not Detected   Comment: INTERPRETIVE INFORMATION:Clonazepam, U  Positive Cutoff: 20 ng/mL  Methodology: Mass Spectrometry   7-AMINOCLONAZEPAM  Not Detected   Comment: INTERPRETIVE INFORMATION:7-Aminoclonazepam, U  Positive Cutoff: 40 ng/mL  Methodology: Mass Spectrometry   DIAZEPAM  Not Detected   Comment: INTERPRETIVE INFORMATION:Diazepam, U  Positive Cutoff: 50 ng/mL  Methodology: Mass Spectrometry   NORDIAZEPAM  Not Detected   Comment: INTERPRETIVE INFORMATION:Nordiazepam, U  Positive Cutoff: 50 ng/mL  Methodology: Mass Spectrometry   OXAZEPAM  Not Detected   Comment: INTERPRETIVE INFORMATION:Oxazepam, U  Positive Cutoff: 50 ng/mL  Methodology: Mass Spectrometry   TEMAZEPAM  Not Detected   Comment: INTERPRETIVE INFORMATION:Temazepam, U  Positive Cutoff: 50 ng/mL  Methodology: Mass Spectrometry   Lorazepam  Not Detected   Comment: INTERPRETIVE INFORMATION:Lorazepam, U  Positive Cutoff: 60 ng/mL  Methodology: Mass Spectrometry   MIDAZOLAM  Not Detected   Comment: INTERPRETIVE INFORMATION:Midazolam, U  Positive Cutoff: 20 ng/mL  Methodology: Mass Spectrometry   ZOLPIDEM  Not Detected   Comment: INTERPRETIVE INFORMATION:Zolpidem, U  Positive Cutoff: 20 ng/mL  Methodology: Mass Spectrometry   BARBITURATES  Negative    Comment: Presumptive negative by immunoassay. Testing by mass  spectrometry is available on request.  INTERPRETIVE INFORMATION:Barbiturates Screen, U  Positive Cutoff: 200 ng/mL  Methodology: Immunoassay   Creatinine, Urine 20.0 - 400.0 mg/dL 138.5   ETHYL GLUCURONIDE  Negative   Comment: Presumptive negative by immunoassay. Testing by mass  spectrometry is available on request.  INTERPRETIVE INFORMATION:Ethyl Glucuronide Screen, U  Positive Cutoff: 500 ng/mL  Methodology: Immunoassay   MARIJUANA METABOLITE  Negative   Comment: Presumptive negative by immunoassay. Testing by mass  spectrometry is available on request.  INTERPRETIVE INFORMATION: THC (Cannabinoids) Screen, U  Positive Cutoff: 50 ng/mL  Methodology: Immunoassay   PCP  Negative   Comment: Presumptive negative by immunoassay. Testing by mass  spectrometry is available on request.  INTERPRETIVE INFORMATION:Phencyclidine Screen, U  Positive Cutoff: 25 ng/mL  Methodology: Immunoassay   CARISOPRODOL  Negative   Comment: Presumptive negative by immunoassay. Testing by mass  spectrometry is available on request.  INTERPRETIVE INFORMATION: Carisoprodol Screen, U  Positive Cutoff: 100 ng/mL  Methodology: Immunoassay  The carisoprodol immunoassay has cross-reactivity to  carisoprodol and meprobamate.   Naloxone  Not Detected   Comment: INTERPRETIVE INFORMATION:Naloxone, U  Positive Cutoff: 100 ng/mL  Methodology: Mass Spectrometry   Gabapentin  Present   Comment: INTERPRETIVE INFORMATION:Gabapentin, U  Positive Cutoff: 3,000 ng/mL  Methodology: Mass Spectrometry   Pregabalin  Not Detected   Comment: INTERPRETIVE INFORMATION:Pregabalin, U  Positive Cutoff: 3,000 ng/mL  Methodology: Mass Spectrometry   Alpha-OH-Midazolam  Not Detected   Comment: INTERPRETIVE INFORMATION:Alpha-OH-Midazolam, U  Positive Cutoff: 20 ng/mL  Methodology: Mass Spectrometry   Zolpidem Metabolite  Not Detected   Comment: INTERPRETIVE INFORMATION:Zolpidem Metabolite, U  Positive Cutoff:  100 ng/mL  Methodology: Mass Spectrometry

## 2024-05-29 ENCOUNTER — TELEPHONE (OUTPATIENT)
Dept: PAIN MEDICINE | Facility: CLINIC | Age: 74
End: 2024-05-29
Payer: MEDICARE

## 2024-05-29 ENCOUNTER — OFFICE VISIT (OUTPATIENT)
Dept: PAIN MEDICINE | Facility: CLINIC | Age: 74
End: 2024-05-29
Attending: ANESTHESIOLOGY
Payer: MEDICARE

## 2024-05-29 VITALS
OXYGEN SATURATION: 98 % | HEART RATE: 61 BPM | SYSTOLIC BLOOD PRESSURE: 189 MMHG | WEIGHT: 143.31 LBS | DIASTOLIC BLOOD PRESSURE: 85 MMHG | TEMPERATURE: 99 F | RESPIRATION RATE: 18 BRPM | BODY MASS INDEX: 27.08 KG/M2

## 2024-05-29 DIAGNOSIS — M54.17 LUMBOSACRAL RADICULOPATHY: Primary | ICD-10-CM

## 2024-05-29 DIAGNOSIS — M47.26 OSTEOARTHRITIS OF SPINE WITH RADICULOPATHY, LUMBAR REGION: ICD-10-CM

## 2024-05-29 DIAGNOSIS — M16.11 OSTEOARTHRITIS OF RIGHT HIP, UNSPECIFIED OSTEOARTHRITIS TYPE: Primary | ICD-10-CM

## 2024-05-29 DIAGNOSIS — M54.16 LUMBAR RADICULOPATHY, CHRONIC: ICD-10-CM

## 2024-05-29 DIAGNOSIS — M48.061 SPINAL STENOSIS OF LUMBAR REGION WITHOUT NEUROGENIC CLAUDICATION: ICD-10-CM

## 2024-05-29 DIAGNOSIS — M16.11 OSTEOARTHRITIS OF RIGHT HIP, UNSPECIFIED OSTEOARTHRITIS TYPE: ICD-10-CM

## 2024-05-29 DIAGNOSIS — G89.4 CHRONIC PAIN SYNDROME: ICD-10-CM

## 2024-05-29 PROCEDURE — 99214 OFFICE O/P EST MOD 30 MIN: CPT | Mod: PBBFAC | Performed by: ANESTHESIOLOGY

## 2024-05-29 PROCEDURE — 99999 PR PBB SHADOW E&M-EST. PATIENT-LVL IV: CPT | Mod: PBBFAC,,, | Performed by: ANESTHESIOLOGY

## 2024-05-29 PROCEDURE — 99214 OFFICE O/P EST MOD 30 MIN: CPT | Mod: S$PBB,,, | Performed by: ANESTHESIOLOGY

## 2024-05-29 RX ORDER — HYDROMORPHONE HYDROCHLORIDE 4 MG/1
4 TABLET ORAL EVERY 8 HOURS PRN
Qty: 90 TABLET | Refills: 0 | Status: SHIPPED | OUTPATIENT
Start: 2024-05-29

## 2024-05-29 NOTE — TELEPHONE ENCOUNTER
Spoke with patient and informed that [facility] transportation contact clinic in reference to appointment for today. The number that was left was incorrect.   Ms. Ridley stated not to worry about it and she will have a ride for appointment this afternoon.

## 2024-05-29 NOTE — TELEPHONE ENCOUNTER
----- Message from Gladys Petty sent at 5/29/2024 11:05 AM CDT -----  Regarding: Transportation  Name of Who is Calling:  Osman calling from [employer]          What is the request in detail:  Please contact Yaneyl would like to have a call back from Dr. Valdez about the patient appointment for today about approval for transportation            Can the clinic reply by [facility]: No            [facility] to [facility] Back [facility]: [phone] Ext

## 2024-05-29 NOTE — PROGRESS NOTES
Chronic patient Established Note (Follow up visit)      SUBJECTIVE:  Interval History 05/29/2024   She returns for follow-up.  She said she meets understood and took both oxycodone and Dilaudid.  She does not think the oxycodone helps at all.  She thinks the Dilaudid helps a little.  She has not been using any over-the-counter medications.  She continues on pregabalin.  She has severe pain that is interfering with her activities of daily living to a great extent.  Says she has difficulties with cooking and hygiene among other things.  Denied having any new bowel or bladder symptomatology.  Pain is mostly around the right hip and right thigh.  She does have lower back pain as well as some radicular symptoms down the right lower extremities with tingling in the great toe.      Interval History 4/9/2024:  The patient is here for follow up of back pain. The pain continues to radiate down the back of the right leg. She had no benefit with HERACLIO in the past. Since previous encounter, she did f/u with Dr. Rodrigez and she did not wish to pursue surgical options at this time. He referred her to Ortho to have her right hip checked. She saw Dr. Faye who does not think that her hip is the issue. He believes that her primary cause is her back, which I agree with. She was given a Toradol shot last month by podiatry which she says did not help her at all for any area of pain. She is starting Healthy Back on 4/18/24. She is taking Percocet 10/325 mg BID which she says does not help very much. She is asking about ITP opioid pain medications. Her pain today is 10/10.    Interval History 2/19/2024:  The patient is here for follow up of right sided back and leg pain. She is s/p right L3/4 and L4/5 TF HERACLIO with no relief, not even short term. She actually feels like it worsened her pain. Leg pain is greater than back pain. She continues to have right sided back pain with radiation down the front of the right leg. She has associated numbness  and weakness. She does have some symptoms on the left, but the right side is the worse. She did see Dr. Rodrigez last year who discussed surgery if epidural did not help. [place] She continues to take Percocet BID with mild benefit. She stopped Gabapentin due to minimal benefit. She is requesting Naproxen as she feels like it was helpful in the past. Her pain today is 10/10.    Interval History 1/16/2024:  Chantal Ridley presents to the clinic for a follow-up appointment for hip pain. She is s/p right hip joint injection on 12/18/2023. She reports relief for 2 weeks. Then, her pain returned to baseline. She continues to report low back pain that radiates into the anterolateral aspect of her right thigh to her knee. She denies any left leg pain. She does have right hip pain. Her pain is worse with standing and walking. She also reports pain with moving from sitting to standing. She is currently taking Percocet as needed with benefit. She denies any adverse effects. She denies any other health changes. Her pain today is 10/10.    HPI  A former patient of Dr. Garland who was on Percocet for chronic pain.  She was given 7.5 to use twice a day.  They had recommended interventions but she declined.  She is interested in interventions if they would help.  She has chronic right lower back pain that radiates to the right groin and thigh.  It also extends this down to the foot anterior and dorsally.  There is sometimes tingling but no numbness.  She does not appreciate weakness.  She is stiff when she gets up and takes her a while to get moving.  There is no imaging of her hip but she thinks she had 1 at [facility].  She had therapy in the remote past and has not been following with the exercises.  She is interested in getting back into therapy.    Pain Disability Index Review:      5/27/2024     1:34 PM 5/1/2024     1:57 PM 4/9/2024     1:57 PM   Last 3 PDI Scores    Pain Disability Index (PDI) 60 70 70       Pain Medications:  Percocet    Opioid Contract: yes     report:  Reviewed and consistent with medication use as prescribed.    Pain Procedures:   12/18/2023- Right hip joint injection  2/5/24 Right L3/4 and L4/5 TF HERACLIO- no relief    Physical Therapy/Home Exercise: yes    Imaging:   Xray Hips 11/21/2023:  CLINICAL HISTORY:  Pain in right hip     FINDINGS:  Two views right hip: There is severe DJD and impingement change.  No fracture dislocation bone destruction seen.    MRI Lumbar Spine 6/21/2023:  COMPARISON:  X-ray 05/30/2023     FINDINGS:  Lumbar spine alignment is within normal limits. The vertebral body heights are well maintained, with no fracture.  Degenerative endplate sclerosis is seen at L2-3.  No marrow signal abnormality to suggest infiltrative process.     The conus is normal in appearance.  The adjacent soft tissue structures show no significant abnormalities.     L1-L2: Circumferential disc bulge and spondylitic spurring.No significant central canal or neural foraminal narrowing.     L2-L3: Circumferential disc bulge, spondylitic spurring, ligamentum flavum thickening and facet arthropathy.  Moderate central spinal stenosis and severe right-sided foraminal stenosis.     L3-L4: Circumferential disc bulge, spondylitic spurring and facet arthropathy.  Posteriorly projecting synovial cyst arising from the right facet.  Severe central spinal stenosis.  No foraminal stenosis.     L4-L5: Circumferential disc bulge, spondylitic spurring and severe facet arthropathy.  Moderate central canal stenosis and no significant foraminal stenosis.     L5-S1: Circumferential disc bulge, spondylitic spurring and superimposed right paracentral and medial foraminal disc protrusion.  No central canal stenosis.  Moderate left-sided foraminal stenosis.     Impression:     Multilevel extradural degenerative change producing moderate to severe spinal stenosis and areas of foraminal  stenosis, most pronounced at L3-4 centrally.    Allergies:   Review of patient's allergies indicates:   Allergen Reactions    Ace inhibitors Anaphylaxis, Itching and Swelling    Amlodipine Swelling    Amoxicillin Anaphylaxis    Erythromycin Swelling and Hives     Tongue swelling    Penicillins Anaphylaxis    Losartan Hives     Headaches. Patient is currently taking Losartan for high b/p and states they are feeling fine.    Tramadol Itching    Olmesartan Rash     Other reaction(s): Skin Rashes / Eruption of skin, Benicar       Current Medications:   Current Outpatient Medications   Medication Sig Dispense Refill    amitriptyline (ELAVIL) 10 MG tablet Take 1 tablet (10 mg total) by mouth nightly as needed for Insomnia. 30 tablet 2    diazePAM (VALIUM) 5 MG tablet Take 1 tablet (5 mg total) by mouth once. Take medication 30 minutes prior to procedure  for 1 dose 1 tablet 0    diclofenac sodium (SOLARAZE) 3 % gel Apply topically 2 (two) times daily.      estrogen, conjugated,-medroxyprogesterone 0.625-2.5mg (PREMPRO) 0.625-2.5 mg per tablet Take 1 tablet by mouth once daily.      estrogen, conjugated,-medroxyprogesterone 0.625-2.5mg (PREMPRO) 0.625-2.5 mg per tablet Take 1 tablet by mouth once daily. 30 tablet 12    fluconazole (DIFLUCAN) 150 MG Tab Take 1 tablet by mouth Now, And repeat in 48 hours.      HYDROmorphone (DILAUDID) 4 MG tablet Take 1 tablet (4 mg total) by mouth every 8 (eight) hours as needed for Pain. 90 tablet 0    hydrOXYzine pamoate (VISTARIL) 25 MG Cap Take 25 mg by mouth daily as needed.      irbesartan (AVAPRO) 150 MG tablet Take 150 mg by mouth every evening.      irbesartan (AVAPRO) 300 MG tablet Take 300 mg by mouth.      LIDOcaine (LIDODERM) 5 % Place 1 patch onto the skin once daily. Remove & Discard patch within 12 hours or as directed by MD 30 patch 6    LIDOcaine-prilocaine (EMLA) cream Apply topically as needed.      phenyleph-min oil-petrolatum 0.25-14-74.9 % Oint Place 1 Application  rectally.      phenylephrine-cocoa butter 0.25-88.44 % Supp suppository Place 1 suppository rectally.      pregabalin (LYRICA) 75 MG capsule Take 1 capsule (75 mg total) by mouth 2 (two) times daily. 60 capsule 1    sitagliptan-metformin (JANUMET)  mg per tablet Take 1 tablet by mouth 2 (two) times daily with meals.      tiZANidine (ZANAFLEX) 4 MG tablet Take 4 mg by mouth every 8 (eight) hours.       No current facility-administered medications for this visit.     Facility-Administered Medications Ordered in Other Visits   Medication Dose Route Frequency Provider Last Rate Last Admin    0.9%  NaCl infusion   Intravenous Continuous Chris Farmer MD           REVIEW OF SYSTEMS:    GENERAL:  No weight loss, malaise or fevers.  HEENT:  Negative for frequent or significant headaches.  NECK:  Negative for lumps, goiter, pain and significant neck swelling.  RESPIRATORY:  Negative for cough, wheezing or shortness of breath.  CARDIOVASCULAR:  Negative for chest pain, leg swelling or palpitations. HTN  GI:  Negative for abdominal discomfort, blood in stools or black stools or change in bowel habits.  MUSCULOSKELETAL:  See HPI.  SKIN:  Negative for lesions, rash, and itching.  PSYCH:  Negative for sleep disturbance, mood disorder and recent psychosocial stressors.  ENDO: Diabetes  HEMATOLOGY/LYMPHOLOGY:  Negative for prolonged bleeding, bruising easily or swollen nodes.  NEURO:   No history of headaches, syncope, paralysis, seizures or tremors.  All other reviewed and negative other than HPI.    Past Medical History:  Past Medical History:   Diagnosis Date    Diabetes mellitus     Fibromyalgia     Hx of degenerative disc disease     neck & back    Hypertension        Past Surgical History:  Past Surgical History:   Procedure Laterality Date    CATARACT EXTRACTION W/  INTRAOCULAR LENS IMPLANT  05/17/2016    EPIDURAL STEROID INJECTION N/A 5/13/2024    Procedure: CAUDAL HERACLIO WITH CATH;  Surgeon: Ike Valdez MD;   Location: [facility];  Service: Pain Management;  Laterality: N/A;  [phone]  2 WK F/U MICHAEL    INJECTION OF JOINT Right 2023    Procedure: INJECTION, JOINT RIGHT HIP;  Surgeon: Ike Valdez MD;  Location: [facility];  Service: Pain Management;  Laterality: Right;  [phone]    TRANSFORAMINAL EPIDURAL INJECTION OF STEROID Right 2024    Procedure: LUMBAR TRANSFORAMINAL RIGHT L3/4 AND L4/5;  Surgeon: Ike Valdez MD;  Location: [facility];  Service: Pain Management;  Laterality: Right;  [phone]  2 WK F/U SIGIFREDO    TUBAL LIGATION         Family History:  No family history on file.    Social History:  Social History     Socioeconomic History    Marital status: Single   Tobacco Use    Smoking status: Former     Current packs/day: 0.00     Types: Cigarettes     Quit date:      Years since quittin.4    Smokeless tobacco: Never   Substance and Sexual Activity    Alcohol use: No    Drug use: No     Social Determinants of Health     Financial Resource Strain: Unknown (2023)    [place] [facility]    Overall Financial Resource Strain (CARDIA)     Difficulty of Paying Living Expenses: Patient declined   Food Insecurity: Unknown (2023)    [place] [facility]    Hunger Vital Sign     Worried About Running Out of Food in the Last Year: Patient declined     Ran Out of Food in the Last Year: Patient declined   Transportation Needs: Unmet Transportation Needs (2023)    [place] [facility]    PRAPARE - Transportation     Lack of Transportation (Medical): No     Lack of Transportation (Non-Medical): Yes   Stress: No Stress Concern Present (2023)    [place] [facility]    [employer] - Occupational Stress Questionnaire     Feeling of Stress : Not at all   Housing Stability: Unknown (2023)    [place] [facility]    Housing Stability Vital Sign     Unable to  Pay for Housing in the Last Year: No     In the last 12 months, was there a time when you did not have a steady place to sleep or slept in a shelter (including now)?: No       OBJECTIVE:    BP (!) 189/85   Pulse 61   Temp 98.5 °F (36.9 °C)   Resp 18   Wt 65 kg (143 lb 4.8 oz)   SpO2 98%   BMI 27.08 kg/m²     PHYSICAL EXAMINATION:    General appearance: Well appearing, in no acute distress, alert and oriented x3.  Psych:  Mood and affect appropriate.  Skin: Skin color, texture, turgor normal, no rashes or lesions, in both upper and lower body.  Head/face:  Atraumatic, normocephalic. No palpable lymph nodes  Back: Straight leg raising in the sitting position is positive to radicular pain bilaterally. Limited ROM with pain on extension and flexion. Positive facet loading bilaterally.  Extremities: No deformities, edema, or skin discoloration. Good capillary refill.  Musculoskeletal: 4/5 strength in right ankle with plantar and dorsiflexion. 5/5 strength in left ankle with plantar and dorsiflexion. 4/5 strength with right knee flexion and extension. 5/5 strength with left knee flexion and extension. 5/5 strength in right EHL, 5/5 strength in left EHL.  Neuro: Decreased sensation to RLE.  Gait: Antalgic- ambulates without assistance.     ASSESSMENT: 74 y.o. year old female with low back and hip pain, consistent with the followin. Lumbosacral radiculopathy  MRI Lumbar Spine Without Contrast      2. Chronic pain syndrome        3. Spinal stenosis of lumbar region without neurogenic claudication  MRI Lumbar Spine Without Contrast    HYDROmorphone (DILAUDID) 4 MG tablet      4. Lumbar radiculopathy, chronic  MRI Lumbar Spine Without Contrast      5. Osteoarthritis of right hip, unspecified osteoarthritis type  Procedure Order to Pain Management      6. Osteoarthritis of spine with radiculopathy, lumbar region  HYDROmorphone (DILAUDID) 4 MG tablet        PLAN:  We discussed with the patient the assessment and  recommendations. The following is the plan we agreed on:  Stop oxycodone.  We will increase Dilaudid to 3 times a day.    Follow-up with Dr. Faye for right hip pain.  Consider right hip joint injection under fluoroscopy we will schedule and get clearance from Orthopedics.  Follow-up with spine surgery.      Ike Valdez  05/29/2024

## 2024-05-30 ENCOUNTER — CLINICAL SUPPORT (OUTPATIENT)
Dept: REHABILITATION | Facility: HOSPITAL | Age: 74
End: 2024-05-30
Payer: MEDICARE

## 2024-05-30 DIAGNOSIS — M25.69 DECREASED ROM OF TRUNK AND BACK: Primary | ICD-10-CM

## 2024-05-30 PROCEDURE — 97110 THERAPEUTIC EXERCISES: CPT | Performed by: PHYSICAL MEDICINE & REHABILITATION

## 2024-05-30 PROCEDURE — 97112 NEUROMUSCULAR REEDUCATION: CPT | Performed by: PHYSICAL MEDICINE & REHABILITATION

## 2024-06-04 ENCOUNTER — TELEPHONE (OUTPATIENT)
Dept: ORTHOPEDICS | Facility: CLINIC | Age: 74
End: 2024-06-04
Payer: MEDICARE

## 2024-06-04 ENCOUNTER — CLINICAL SUPPORT (OUTPATIENT)
Dept: REHABILITATION | Facility: HOSPITAL | Age: 74
End: 2024-06-04
Payer: MEDICARE

## 2024-06-04 DIAGNOSIS — M25.69 DECREASED ROM OF TRUNK AND BACK: Primary | ICD-10-CM

## 2024-06-04 PROCEDURE — 97112 NEUROMUSCULAR REEDUCATION: CPT

## 2024-06-04 PROCEDURE — 97110 THERAPEUTIC EXERCISES: CPT

## 2024-06-04 NOTE — TELEPHONE ENCOUNTER
I called the patient today regarding her appointment. Patient stated that she has PT scheduled and wants to see how that goes. She declined scheduling an appointment and will call back when she is ready . The patient verbalized understanding and has no further questions.               ----- Message from Ivan Steel III, MD sent at 6/4/2024  6:59 AM CDT -----  Thank you happy to see her  Agree, please hold any intra-articular injections until I see her  ----- Message -----  From: Ike Valdez MD  Sent: 5/29/2024   4:23 PM CDT  To: Ivan Steel III, MD; Yen Guaman LPN; #    Please do Thank you  ----- Message -----  From: Jeronimo Rodrigez MD  Sent: 5/29/2024   3:53 PM CDT  To: Ike Valdez MD; Ivan Steel III, MD; #    We can see her back for her spine if she wants.  ----- Message -----  From: Ike Valdez MD  Sent: 5/29/2024   3:23 PM CDT  To: Ivan Steel III, MD; Willam Faye MD; #    Sure. Would you please see her Dr. Steel. I will not do the steroid injection as it only provided a few weeks of relief in the past. Thank you  ----- Message -----  From: Willam Faye MD  Sent: 5/29/2024   2:50 PM CDT  To: Ike Valdez MD; Ivan Steel III, MD; #    Just keep in mind that she cannot have hip replacement within 3 months of an intra-articular injection because of an increased risk of postoperative infection.  Certainly, injecting the spine is no problem from total joint standpoint.  It is she interested in spine surgery or hip surgery?      You may have heard that I have recently stopped doing surgery because of my own arthritis problems, so she should see Dr. Steel for hip replacement.  ----- Message -----  From: Ike Valdez MD  Sent: 5/29/2024   1:53 PM CDT  To: Willam Faye MD; Jeronimo Rodrigez MD    Hi  Severe spinal stenosis and R hip DJD. It seems both are hurting but more the hip. Now she is agreeable to surgery. I will repeat MRI L spine. If no objections I will repeat  the R hip injection. She got a few weeks relief with previous hip injection. ESIs made her worse. Please see her ASAP and let me know if you are ok or not with the hip injection. Thank you  Ike

## 2024-06-04 NOTE — PROGRESS NOTES
"[facility]      Name: Chantal Ridley  Clinic Number: [id number]    Therapy Diagnosis:   Encounter Diagnosis   Name Primary?    Decreased ROM of trunk and back Yes         Physician: Jeronimo Rodrigez MD    Visit Date: 2024  Physician Orders: PT Eval and Treat  Medical Diagnosis from Referral:   Diagnosis   M47.816 (ICD-10-CM) - Lumbar spondylosis      Evaluation Date: 2024  Authorization Period Expiration: 24  Plan of Care Expiration: 2024  Reassessment Due:7/3/24  Visit # / Visits authorized:  (lumbar roll on MedX)  MedX testing visit 2     Time In: 1  Time Out: 2  Total Billable Time: 30min 1:1  INSURANCE and OUTCOMES: Fee for Service with FOTO Outcomes 1/3     Precautions: standard, HTN, and diabetes     Pattern of pain determined: 1PEN    Subjective   Chantal reports chronic lower back and R thigh pain that is rated as high.  Pt reports she has been trying to stretch at home    Patient reports tolerating previous visit: Min soreness  Patient reports their pain to be 8/10 on a 0-10 scale with 0 being no pain and 10 being the worst pain imaginable.  Pain Location: LB B\  Social History:  lives alone in shotgun home, has hand rails and 3 steps into house  Occupation: retired  Leisure: visit with friends   Pt goals: " decrease pain"     Objective     Baseline IM Testing Results:   Date of testin24  ROM 12=30 deg   Max Peak Torque 92    Min Peak Torque 69    Flex/Ext Ratio 1.3:1   % below normative data 23     OUTCOMES SELECTION:   Intake Outcome Measure for FOTO Lumbar Survey     Therapist reviewed FOTO scores for Chantal Ridley on 2024.   FOTO documents entered into EPIC - see Media section.     Intake Score: 13% functional ability  Goal Score: 30% functional ability         Treatment    Chantal received the treatments listed below:      Chantal received neuromuscular education  to isolate and engage spinal stabilization musculature " correctly for motor control and coordination to aid in function and posture for 10 minutes on the Medical Medx Machine.  Patient performed MedX dynamic exercise with emphasis on spinal muscular control using pacer throughout  active range of motion. Therapist assisted patient in achieving optimal exertion for neural reeducation and endurance training by using the  Nandini Exertion Rating scale, by instructing the patient to aim for mid range of exertion, performing 15-20 repetitions, slowly, correctly,and safely.           6/4/2024     1:43 PM   [facility]   Visit Number 6   VAS Pain Rating 8   Time 5   Lumbar Stretches - Slouch Overcorrection 10   Flexion in Lying 10   Lumbar Weight 42 lbs   Repetitions 18   Rating of Perceived Exertion 4   Ice - Z Lie (in min.) 5             therapeutic exercises to develop strength, endurance, ROM, flexibility, posture, and core stabilization for 50 minutes including:    LTR 10x  BKTC c/ ball  PPT 10x  TrA activation with T-ball x 10  Bridging x 10 (minimal lift off) c/ YTB  BKFO c/YTB 10x  +Long axis distraction R LE  + SOC x 10  HSS 2x30   Piriformis stretch 2x 30  Standing abd /ext 10x  Peripheral muscle strengthening which included 1 set of 15-20 repetitions at a slow, controlled 10-13 second per rep pace focused on strengthening supporting musculature for improved body mechanics and functional mobility.  Pt and therapist focused on proper form during treatment to ensure optimal strengthening of each targeted muscle group.  She was able to complete full circuit of peripheral machines today with increased time.    cold pack for 5 minutes to LB.    Home Exercises Provided and Patient Education Provided   Home exercises include:LTR, bridge, laq, ball squeeze, walking  Cardio program:5/16/24 and encouraged walking in home 5/30/24  Lifting education date:[facility]  [employer] Discharge handout (date given):  Equipment at home/gym membership: no      Education provided:   -   cues w/exs  - MedX performance  - Precor ex performance  - HB Protocol  -walking more in home, moving    Written Home Exercises Provided: Patient instructed to cont prior HEP.  Exercises were reviewed and Chantal was able to demonstrate them prior to the end of the session.  Chantal demonstrated fair understanding of the education provided.     See EMR under Patient Instructions for exercises provided 5/30/24    Assessment   Chantal presents to her  6th healthy back visit reporting chronic back and R thigh pain that is rated as very high.   Treatment continued with flexibility, strengthening and neuromuscular reeducation ex's.  Added piriformis/hss stretch as well as standing abd/hip extension.  Pt encouraged to perform quad sets and LAQ's at home.  Lumbar MedX resistance increased to 42ft/lbs. She completed 18 reps with a RPE =4/10 (lumbar Roll used on MedX for comfort) Cues required for pacing, full ROM and extension hold. She completed all of the peripheral strengthening ex's to including : torso rotation, leg press, hip abd/add, leg extension, leg curl, triceps, biceps, row and chest press with modifications as needed and increased time to complete.. Will continue per HB protocol and patient tolerance.     Patient is making fair progress towards established goals.  Pt will continue to benefit from skilled outpatient physical therapy to address the deficits stated in the impairment chart, provide pt/family education and to maximize pt's level of independence in the home and community environment.     Anticipated Barriers for therapy: transportation/pain level   Pt's spiritual, cultural and educational needs considered and pt agreeable to plan of care and goals as stated below:       GOALS: Pt is in agreement with the following goals.     Short term goals:  6 weeks or 10 visits   - Pt will demonstrate increased lumbar MedX ROM by at least 3 degrees from the initial ROM value with improvements noted in functional ROM and  ability to perform ADLs. Appropriate and Ongoing  - Pt will demonstrate increased MedX average isometric strength value by 20% from initial test resulting in improved ability to perform bending, lifting, and carrying activities safely, confidently. Appropriate and Ongoing  - Pt will report a reduction in worst pain score by 1-2 points for improved tolerance for standing. Appropriate and Ongoing  - Pt able to perform HEP correctly with minimal cueing or supervision from therapist to encourage independent management of symptoms. Appropriate and Ongoing     Long term goals: 10 weeks or 20 visits   - Pt will demonstrate increased lumbar MedX ROM by at least 9 degrees from initial ROM value, resulting in improved ability to perform functional forward bending while standing and sitting. Appropriate and Ongoing  - Pt will demonstrate increased MedX average isometric strength value by 40% from initial test resulting in improved ability to perform bending, lifting, and carrying activities safely and confidently. Appropriate and Ongoing  - Pt to demonstrate ability to independently control and reduce their pain through posture positioning and mechanical movements throughout a typical day. Appropriate and Ongoing  - Pt will demonstrate reduced pain and improved functional outcomes as reported on the FOTO by reaching an intake score of >/= 30% functional ability in order to demonstrate subjective improvement in patient's condition. . Appropriate and Ongoing  - Pt will demonstrate independence with the HEP at discharge. Appropriate and Ongoing  - Pt(patient goal)will be able to walk without AD > 5 minutes without sitting  Appropriate and Ongoing    Plan   Continue with established Plan of Care towards established PT goals.     Therapist: Paola Temple, PT  6/4/2024

## 2024-06-06 ENCOUNTER — CLINICAL SUPPORT (OUTPATIENT)
Dept: REHABILITATION | Facility: HOSPITAL | Age: 74
End: 2024-06-06
Payer: MEDICARE

## 2024-06-06 DIAGNOSIS — M25.69 DECREASED ROM OF TRUNK AND BACK: Primary | ICD-10-CM

## 2024-06-06 PROCEDURE — 97112 NEUROMUSCULAR REEDUCATION: CPT

## 2024-06-06 PROCEDURE — 97110 THERAPEUTIC EXERCISES: CPT

## 2024-06-06 NOTE — PROGRESS NOTES
"[facility]      Name: Chantal Ridley  Clinic Number: [id number]    Therapy Diagnosis:   No diagnosis found.        Physician: Jeronimo Rodrigez MD    Visit Date: 2024  Physician Orders: PT Eval and Treat  Medical Diagnosis from Referral:   Diagnosis   M47.816 (ICD-10-CM) - Lumbar spondylosis      Evaluation Date: 2024  Authorization Period Expiration: 24  Plan of Care Expiration: 2024  Reassessment Due:7/3/24  Visit # / Visits authorized:  (lumbar roll on MedX)  MedX testing visit 2     Time In: 230  Time Out: 330  Total Billable Time: 30min 1:1  INSURANCE and OUTCOMES: Fee for Service with FOTO Outcomes 2/3     Precautions: standard, HTN, and diabetes     Pattern of pain determined: 1PEN    Subjective   Chantal reports chronic lower back and R thigh pain that is rated as high. Pt reports she is stretching and working on leg strength at home.    Patient reports tolerating previous visit: Min soreness  Patient reports their pain to be 8/10 on a 0-10 scale with 0 being no pain and 10 being the worst pain imaginable.  Pain Location: LB B\  Social History:  lives alone in shotgun home, has hand rails and 3 steps into house  Occupation: retired  Leisure: visit with friends   Pt goals: " decrease pain"     Objective     Baseline IM Testing Results:   Date of testin24  ROM 12=30 deg   Max Peak Torque 92    Min Peak Torque 69    Flex/Ext Ratio 1.3:1   % below normative data 23     OUTCOMES SELECTION:   Intake Outcome Measure for FOTO Lumbar Survey     Therapist reviewed FOTO scores for Chantal Ridley on 2024.   FOTO documents entered into EPIC - see Media section.     Intake Score: 13% functional ability  visit 6 9%  Goal Score: 30% functional ability         Treatment    Chantal received the treatments listed below:      Chantal received neuromuscular education  to isolate and engage spinal stabilization musculature correctly for motor control and " coordination to aid in function and posture for 10 minutes on the Medical Medx Machine.  Patient performed MedX dynamic exercise with emphasis on spinal muscular control using pacer throughout  active range of motion. Therapist assisted patient in achieving optimal exertion for neural reeducation and endurance training by using the  Nandini Exertion Rating scale, by instructing the patient to aim for mid range of exertion, performing 15-20 repetitions, slowly, correctly,and safely.           6/6/2024     2:23 PM   [facility]   Visit Number 7   VAS Pain Rating 6   Time 5   Lumbar Stretches - Slouch Overcorrection 10   Flexion in Lying 10   Ice - Z Lie (in min.) 5     Pt performed Precor back extension 2/2 Med X not available .  Pt performed 30lbs 15 reps at 3/10 exertion level.          therapeutic exercises to develop strength, endurance, ROM, flexibility, posture, and core stabilization for 50 minutes including:    LTR 10x  BKTC c/ ball  PPT 10x  TrA activation with T-ball x 10  Bridging x 10 (minimal lift off) c/ YTB  BKFO c/YTB 10x  +Long axis distraction R LE  + SOC x 10  HSS 2x30   Piriformis stretch 2x 30  Standing abd /ext 10x  LAQ .75# 20x  Seated knee extension 20x  Peripheral muscle strengthening which included 1 set of 15-20 repetitions at a slow, controlled 10-13 second per rep pace focused on strengthening supporting musculature for improved body mechanics and functional mobility.  Pt and therapist focused on proper form during treatment to ensure optimal strengthening of each targeted muscle group.  She was able to complete full circuit of peripheral machines today with increased time.    cold pack for 5 minutes to LB.    Home Exercises Provided and Patient Education Provided   Home exercises include:LTR, bridge, laq, ball squeeze, walking  Cardio program:5/16/24 and encouraged walking in home 5/30/24  Lifting education date:[facility]  [employer] Discharge handout (date given):  Equipment at  home/gym membership: no      Education provided:   -  cues w/exs  - MedX performance  - Precor ex performance  - HB Protocol  -walking more in home, moving    Written Home Exercises Provided: Patient instructed to cont prior HEP.  Exercises were reviewed and Chantal was able to demonstrate them prior to the end of the session.  Chantal demonstrated fair understanding of the education provided.     See EMR under Patient Instructions for exercises provided 5/30/24    Assessment   Chantal presents to her  7th healthy back visit reporting chronic back and R thigh pain that is rated as very high. Pt noted to be ambulating with decreased deviations.   Treatment continued with flexibility, strengthening and neuromuscular reeducation ex's.  Added LAQ and SAQ with weight today for R LE.   Pt encouraged to perform at home.  Performed Pre cor trunk extension due to Med X unavailable.. She completed 15 reps  at 30lbs. Resume Med X next visit.. She completed all of the peripheral strengthening ex's to including : torso rotation, leg press, hip abd/add, leg extension, leg curl, triceps, biceps, row and chest press with modifications as needed and increased time to complete.. Will continue per HB protocol and patient tolerance.     Patient is making fair progress towards established goals.  Pt will continue to benefit from skilled outpatient physical therapy to address the deficits stated in the impairment chart, provide pt/family education and to maximize pt's level of independence in the home and community environment.     Anticipated Barriers for therapy: transportation/pain level   Pt's spiritual, cultural and educational needs considered and pt agreeable to plan of care and goals as stated below:       GOALS: Pt is in agreement with the following goals.     Short term goals:  6 weeks or 10 visits   - Pt will demonstrate increased lumbar MedX ROM by at least 3 degrees from the initial ROM value with improvements noted in functional  ROM and ability to perform ADLs. Appropriate and Ongoing  - Pt will demonstrate increased MedX average isometric strength value by 20% from initial test resulting in improved ability to perform bending, lifting, and carrying activities safely, confidently. Appropriate and Ongoing  - Pt will report a reduction in worst pain score by 1-2 points for improved tolerance for standing. Appropriate and Ongoing  - Pt able to perform HEP correctly with minimal cueing or supervision from therapist to encourage independent management of symptoms. Appropriate and Ongoing     Long term goals: 10 weeks or 20 visits   - Pt will demonstrate increased lumbar MedX ROM by at least 9 degrees from initial ROM value, resulting in improved ability to perform functional forward bending while standing and sitting. Appropriate and Ongoing  - Pt will demonstrate increased MedX average isometric strength value by 40% from initial test resulting in improved ability to perform bending, lifting, and carrying activities safely and confidently. Appropriate and Ongoing  - Pt to demonstrate ability to independently control and reduce their pain through posture positioning and mechanical movements throughout a typical day. Appropriate and Ongoing  - Pt will demonstrate reduced pain and improved functional outcomes as reported on the FOTO by reaching an intake score of >/= 30% functional ability in order to demonstrate subjective improvement in patient's condition. . Appropriate and Ongoing  - Pt will demonstrate independence with the HEP at discharge. Appropriate and Ongoing  - Pt(patient goal)will be able to walk without AD > 5 minutes without sitting  Appropriate and Ongoing    Plan   Continue with established Plan of Care towards established PT goals.     Therapist: Paola Temple, PT  6/6/2024

## 2024-06-11 ENCOUNTER — CLINICAL SUPPORT (OUTPATIENT)
Dept: REHABILITATION | Facility: HOSPITAL | Age: 74
End: 2024-06-11
Payer: MEDICARE

## 2024-06-11 DIAGNOSIS — M25.69 DECREASED ROM OF TRUNK AND BACK: Primary | ICD-10-CM

## 2024-06-11 PROCEDURE — 97110 THERAPEUTIC EXERCISES: CPT

## 2024-06-11 PROCEDURE — 97112 NEUROMUSCULAR REEDUCATION: CPT

## 2024-06-11 NOTE — PROGRESS NOTES
"[facility]      Name: Chantal Ridley  Clinic Number: [id number]    Therapy Diagnosis:   Encounter Diagnosis   Name Primary?    Decreased ROM of trunk and back Yes           Physician: Jeronimo Rodrigez MD    Visit Date: 2024  Physician Orders: PT Eval and Treat  Medical Diagnosis from Referral:   Diagnosis   M47.816 (ICD-10-CM) - Lumbar spondylosis      Evaluation Date: 2024  Authorization Period Expiration: 24  Plan of Care Expiration: 2024  Reassessment Due:7/3/24  Visit # / Visits authorized:  (lumbar roll on MedX)  MedX testing visit 2     Time In: 150  Time Out: 250  Total Billable Time: 55min 1:1  INSURANCE and OUTCOMES: Fee for Service with FOTO Outcomes 2/3     Precautions: standard, HTN, and diabetes     Pattern of pain determined: 1PEN    Subjective   Chantal reports chronic lower back and R thigh pain, however she states she is able to do more activity since starting the program    Patient reports tolerating previous visit: Min soreness  Patient reports their pain to be 8/10 on a 0-10 scale with 0 being no pain and 10 being the worst pain imaginable.  Pain Location: LB B\  Social History:  lives alone in shotgun home, has hand rails and 3 steps into house  Occupation: retired  Leisure: visit with friends   Pt goals: " decrease pain"     Objective     Baseline IM Testing Results:   Date of testin24  ROM 12=30 deg   Max Peak Torque 92    Min Peak Torque 69    Flex/Ext Ratio 1.3:1   % below normative data 23     OUTCOMES SELECTION:   Intake Outcome Measure for FOTO Lumbar Survey     Therapist reviewed FOTO scores for Chantal Ridley on 2024.   FOTO documents entered into [employer] - see Media section.     Intake Score: 13% functional ability  visit 6 9%  Goal Score: 30% functional ability         Treatment    Chantal received the treatments listed below:      Chantal received neuromuscular education  to isolate and engage spinal " stabilization musculature correctly for motor control and coordination to aid in function and posture for 10 minutes on the Medical Medx Machine.  Patient performed MedX dynamic exercise with emphasis on spinal muscular control using pacer throughout  active range of motion. Therapist assisted patient in achieving optimal exertion for neural reeducation and endurance training by using the  Nandini Exertion Rating scale, by instructing the patient to aim for mid range of exertion, performing 15-20 repetitions, slowly, correctly,and safely.             6/11/2024     2:10 PM   [facility]   Visit Number 8   VAS Pain Rating 8   Time 5   Lumbar Stretches - Slouch Overcorrection 10   Flexion in Lying 10   Lumbar Flexion 36   Lumbar Extension 12   Lumbar Weight 42 lbs   Repetitions 20   Rating of Perceived Exertion 4   Ice - Z Lie (in min.) 5               therapeutic exercises to develop strength, endurance, ROM, flexibility, posture, and core stabilization for 50 minutes including:    LTR 10x  BKTC c/ ball  PPT 10x  TrA activation with T-ball x 10  Bridging x 10 (minimal lift off) c/ RTB  BKFO c/ RTB 10x  +Long axis distraction R LE  + SOC x 10  HSS 2x30   Piriformis stretch 2x 30  Standing abd /ext 10x  LAQ 2# 20x  Seated knee extension 20x 2#  Peripheral muscle strengthening which included 1 set of 15-20 repetitions at a slow, controlled 10-13 second per rep pace focused on strengthening supporting musculature for improved body mechanics and functional mobility.  Pt and therapist focused on proper form during treatment to ensure optimal strengthening of each targeted muscle group.  She was able to complete full circuit of peripheral machines today with increased time.    cold pack for 5 minutes to LB.    Home Exercises Provided and Patient Education Provided   Home exercises include:LTR, bridge, laq, ball squeeze, walking  Cardio program:5/16/24 and encouraged walking in home 5/30/24  Lifting education  date:TBD [employer] Magn[employer] Discharge handout (date given):  Equipment at home/gym membership: no      Education provided:   -  cues w/exs  - MedX performance  - Precor ex performance  - HB Protocol  -walking more in home, moving    Written Home Exercises Provided: Patient instructed to cont prior HEP.  Exercises were reviewed and Chantal was able to demonstrate them prior to the end of the session.  Chantal demonstrated fair understanding of the education provided.     See EMR under Patient Instructions for exercises provided 5/30/24    Assessment   Chantal presents to her 8th healthy back visit reporting chronic back and R thigh pain that is rated as very high. Despite high pain level, pt reports she is now able to walk further and do more overall.    Treatment continued with flexibility, strengthening and neuromuscular reeducation ex's.  Added weight to SAQ/LAQ today. Continued with previous Med X weight of  42ft/lbs and was able to complete 20 reps at 4/10 exertion level. She completed all of the peripheral strengthening ex's to including : torso rotation, leg press, hip abd/add, leg extension, leg curl, triceps, biceps, row and chest press with modifications as needed and increased time to complete.. Will continue per HB protocol and patient tolerance.     Patient is making fair progress towards established goals.  Pt will continue to benefit from skilled outpatient physical therapy to address the deficits stated in the impairment chart, provide pt/family education and to maximize pt's level of independence in the home and community environment.     Anticipated Barriers for therapy: transportation/pain level   Pt's spiritual, cultural and educational needs considered and pt agreeable to plan of care and goals as stated below:       GOALS: Pt is in agreement with the following goals.     Short term goals:  6 weeks or 10 visits   - Pt will demonstrate increased lumbar MedX ROM by at least 3 degrees from the initial ROM  value with improvements noted in functional ROM and ability to perform ADLs. Appropriate and Ongoing  - Pt will demonstrate increased MedX average isometric strength value by 20% from initial test resulting in improved ability to perform bending, lifting, and carrying activities safely, confidently. Appropriate and Ongoing  - Pt will report a reduction in worst pain score by 1-2 points for improved tolerance for standing. Appropriate and Ongoing  - Pt able to perform HEP correctly with minimal cueing or supervision from therapist to encourage independent management of symptoms. Appropriate and Ongoing     Long term goals: 10 weeks or 20 visits   - Pt will demonstrate increased lumbar MedX ROM by at least 9 degrees from initial ROM value, resulting in improved ability to perform functional forward bending while standing and sitting. Appropriate and Ongoing  - Pt will demonstrate increased MedX average isometric strength value by 40% from initial test resulting in improved ability to perform bending, lifting, and carrying activities safely and confidently. Appropriate and Ongoing  - Pt to demonstrate ability to independently control and reduce their pain through posture positioning and mechanical movements throughout a typical day. Appropriate and Ongoing  - Pt will demonstrate reduced pain and improved functional outcomes as reported on the FOTO by reaching an intake score of >/= 30% functional ability in order to demonstrate subjective improvement in patient's condition. . Appropriate and Ongoing  - Pt will demonstrate independence with the HEP at discharge. Appropriate and Ongoing  - Pt(patient goal)will be able to walk without AD > 5 minutes without sitting  Appropriate and Ongoing    Plan   Continue with established Plan of Care towards established PT goals.     Therapist: Paola Temple, PT  6/11/2024

## 2024-06-12 ENCOUNTER — OFFICE VISIT (OUTPATIENT)
Dept: PODIATRY | Facility: CLINIC | Age: 74
End: 2024-06-12
Payer: MEDICARE

## 2024-06-12 VITALS
HEART RATE: 57 BPM | SYSTOLIC BLOOD PRESSURE: 148 MMHG | DIASTOLIC BLOOD PRESSURE: 83 MMHG | WEIGHT: 143.31 LBS | BODY MASS INDEX: 27.06 KG/M2 | HEIGHT: 61 IN

## 2024-06-12 DIAGNOSIS — G57.81 NERVE ENTRAPMENT OF LOWER LIMB, RIGHT: ICD-10-CM

## 2024-06-12 DIAGNOSIS — E08.00 DIABETES MELLITUS DUE TO UNDERLYING CONDITION WITH HYPEROSMOLARITY WITHOUT COMA, WITHOUT LONG-TERM CURRENT USE OF INSULIN: ICD-10-CM

## 2024-06-12 DIAGNOSIS — M54.17 LUMBOSACRAL RADICULOPATHY: Primary | ICD-10-CM

## 2024-06-12 PROCEDURE — 20605 DRAIN/INJ JOINT/BURSA W/O US: CPT | Mod: S$PBB,RT,, | Performed by: PODIATRIST

## 2024-06-12 PROCEDURE — 99214 OFFICE O/P EST MOD 30 MIN: CPT | Mod: PBBFAC | Performed by: PODIATRIST

## 2024-06-12 PROCEDURE — 99214 OFFICE O/P EST MOD 30 MIN: CPT | Mod: 25,S$PBB,, | Performed by: PODIATRIST

## 2024-06-12 PROCEDURE — 20605 DRAIN/INJ JOINT/BURSA W/O US: CPT | Mod: PBBFAC | Performed by: PODIATRIST

## 2024-06-12 PROCEDURE — 99999 PR PBB SHADOW E&M-EST. PATIENT-LVL IV: CPT | Mod: PBBFAC,,, | Performed by: PODIATRIST

## 2024-06-13 ENCOUNTER — CLINICAL SUPPORT (OUTPATIENT)
Dept: REHABILITATION | Facility: HOSPITAL | Age: 74
End: 2024-06-13
Payer: MEDICARE

## 2024-06-13 DIAGNOSIS — M25.69 DECREASED ROM OF TRUNK AND BACK: Primary | ICD-10-CM

## 2024-06-13 PROCEDURE — 97110 THERAPEUTIC EXERCISES: CPT

## 2024-06-13 PROCEDURE — 97112 NEUROMUSCULAR REEDUCATION: CPT

## 2024-06-13 NOTE — PROGRESS NOTES
"[facility]      Name: Chantal Ridley  Clinic Number: [id number]    Therapy Diagnosis:   Encounter Diagnosis   Name Primary?    Decreased ROM of trunk and back Yes           Physician: Jeronimo Rodrigez MD    Visit Date: 2024  Physician Orders: PT Eval and Treat  Medical Diagnosis from Referral:   Diagnosis   M47.816 (ICD-10-CM) - Lumbar spondylosis      Evaluation Date: 2024  Authorization Period Expiration: 24  Plan of Care Expiration: 2024  Reassessment Due:7/3/24  Visit # / Visits authorized:  (lumbar roll on MedX)  MedX testing visit 2     Time In: 215  Time Out: 315  Total Billable Time: 30 min   INSURANCE and OUTCOMES: Fee for Service with FOTO Outcomes /3     Precautions: standard, HTN, and diabetes     Pattern of pain determined: 1PEN    Subjective   Chanatl reports her pain is intense today.  States she went to podiatrist who recommended shoe inserts.    Patient reports tolerating previous visit: Min soreness  Patient reports their pain to be 8/10 on a 0-10 scale with 0 being no pain and 10 being the worst pain imaginable.  Pain Location: LB B\  Social History:  lives alone in shotgun home, has hand rails and 3 steps into house  Occupation: retired  Leisure: visit with friends   Pt goals: " decrease pain"     Objective     Baseline IM Testing Results:   Date of testin24  ROM 12=30 deg   Max Peak Torque 92    Min Peak Torque 69    Flex/Ext Ratio 1.3:1   % below normative data 23     OUTCOMES SELECTION:   Intake Outcome Measure for FOTO Lumbar Survey     Therapist reviewed FOTO scores for Chantal Ridley on 2024.   FOTO documents entered into EPIC - see Media section.     Intake Score: 13% functional ability  visit 6 9%  Goal Score: 30% functional ability         Treatment    Chantal received the treatments listed below:      Chantal received neuromuscular education  to isolate and engage spinal stabilization musculature " correctly for motor control and coordination to aid in function and posture for 10 minutes on the Medical Medx Machine.  Patient performed MedX dynamic exercise with emphasis on spinal muscular control using pacer throughout  active range of motion. Therapist assisted patient in achieving optimal exertion for neural reeducation and endurance training by using the  Nandini Exertion Rating scale, by instructing the patient to aim for mid range of exertion, performing 15-20 repetitions, slowly, correctly,and safely.           6/13/2024     2:58 PM   [facility]   Visit Number 9   VAS Pain Rating 8   Time 5   Lumbar Stretches - Slouch Overcorrection 10   Flexion in Lying 10   Lumbar Weight 46 lbs   Repetitions 15   Rating of Perceived Exertion 4   Ice - Z Lie (in min.) 5             therapeutic exercises to develop strength, endurance, ROM, flexibility, posture, and core stabilization for 50 minutes including:    LTR 10x  BKTC c/ ball  PPT 10x  TrA activation with T-ball x 10  Bridging x 10 (minimal lift off) c/ RTB  BKFO c/ RTB 10x  +Long axis distraction R LE  + SOC x 10  HSS 2x30   Piriformis stretch 2x 30  Standing abd /ext 10x  LAQ 2# 20x R  Seated hip flex 2# 10 reps  Seated knee extension 20x 2# R  Peripheral muscle strengthening which included 1 set of 15-20 repetitions at a slow, controlled 10-13 second per rep pace focused on strengthening supporting musculature for improved body mechanics and functional mobility.  Pt and therapist focused on proper form during treatment to ensure optimal strengthening of each targeted muscle group.  She was able to complete full circuit of peripheral machines today with increased time.    cold pack for 5 minutes to LB.    Home Exercises Provided and Patient Education Provided   Home exercises include:LTR, bridge, laq, ball squeeze, walking  Cardio program:5/16/24 and encouraged walking in home 5/30/24  [facility]  given[facility]  Equipment at home/gym membership: no      Education provided:   -  cues w/exs  - MedX performance  - Precor ex performance  - HB Protocol  -walking more in home, moving    Written Home Exercises Provided: Patient instructed to cont prior HEP.  Exercises were reviewed and Chantal was able to demonstrate them prior to the end of the session.  Chantal demonstrated fair understanding of the education provided.     See EMR under Patient Instructions for exercises provided 5/30/24    Assessment   Chantal presents to her 8th healthy back visit reporting chronic back and R thigh pain that is rated as very high. Treatment continued with flexibility, strengthening and neuromuscular reeducation ex's.   Added seated hip flexion with 2# weight RLE. Continued with previous Med X weight of  46ft/lbs and was able to complete 15reps at 4/10 exertion level. She completed all of the peripheral strengthening ex's to including : torso rotation, leg press, hip abd/add, leg extension, leg curl, triceps, biceps, row and chest press with modifications as needed and increased time to complete.. Will continue per HB protocol and patient tolerance.     Patient is making fair progress towards established goals.  Pt will continue to benefit from skilled outpatient physical therapy to address the deficits stated in the impairment chart, provide pt/family education and to maximize pt's level of independence in the home and community environment.     Anticipated Barriers for therapy: transportation/pain level   Pt's spiritual, cultural and educational needs considered and pt agreeable to plan of care and goals as stated below:       GOALS: Pt is in agreement with the following goals.     Short term goals:  6 weeks or 10 visits   - Pt will demonstrate increased lumbar MedX ROM by at least 3 degrees from the initial ROM value with improvements noted in functional ROM and ability to perform ADLs. Appropriate and Ongoing  - Pt will demonstrate  increased MedX average isometric strength value by 20% from initial test resulting in improved ability to perform bending, lifting, and carrying activities safely, confidently. Appropriate and Ongoing  - Pt will report a reduction in worst pain score by 1-2 points for improved tolerance for standing. Appropriate and Ongoing  - Pt able to perform HEP correctly with minimal cueing or supervision from therapist to encourage independent management of symptoms. Appropriate and Ongoing     Long term goals: 10 weeks or 20 visits   - Pt will demonstrate increased lumbar MedX ROM by at least 9 degrees from initial ROM value, resulting in improved ability to perform functional forward bending while standing and sitting. Appropriate and Ongoing  - Pt will demonstrate increased MedX average isometric strength value by 40% from initial test resulting in improved ability to perform bending, lifting, and carrying activities safely and confidently. Appropriate and Ongoing  - Pt to demonstrate ability to independently control and reduce their pain through posture positioning and mechanical movements throughout a typical day. Appropriate and Ongoing  - Pt will demonstrate reduced pain and improved functional outcomes as reported on the FOTO by reaching an intake score of >/= 30% functional ability in order to demonstrate subjective improvement in patient's condition. . Appropriate and Ongoing  - Pt will demonstrate independence with the HEP at discharge. Appropriate and Ongoing  - Pt(patient goal)will be able to walk without AD > 5 minutes without sitting  Appropriate and Ongoing    Plan   Continue with established Plan of Care towards established PT goals.     Therapist: Paola Temple, PT  6/13/2024

## 2024-06-18 ENCOUNTER — CLINICAL SUPPORT (OUTPATIENT)
Dept: REHABILITATION | Facility: HOSPITAL | Age: 74
End: 2024-06-18
Payer: MEDICARE

## 2024-06-18 DIAGNOSIS — M25.69 DECREASED ROM OF TRUNK AND BACK: Primary | ICD-10-CM

## 2024-06-18 PROCEDURE — 97110 THERAPEUTIC EXERCISES: CPT

## 2024-06-18 PROCEDURE — 97750 PHYSICAL PERFORMANCE TEST: CPT

## 2024-06-18 NOTE — PROGRESS NOTES
"[facility]      Name: Chantal Ridley  Clinic Number: [id number]    Therapy Diagnosis:   Encounter Diagnosis   Name Primary?    Decreased ROM of trunk and back Yes           Physician: Jeronimo Rodrigez MD    Visit Date: 2024  Physician Orders: PT Eval and Treat  Medical Diagnosis from Referral:   Diagnosis   M47.816 (ICD-10-CM) - Lumbar spondylosis      Evaluation Date: 2024  Authorization Period Expiration: 24  Plan of Care Expiration: 2024  Reassessment Due:7/3/24  Visit # / Visits authorized: 10/20 (lumbar roll on MedX)  MedX testing visit 2     Time In:2  Time Out:3  Total Billable Time: 55min   INSURANCE and OUTCOMES: Fee for Service with FOTO Outcomes 2/3     Precautions: standard, HTN, and diabetes     Pattern of pain determined: 1PEN    Subjective   Chantal reports she is working on increasing walking/exercise.    Patient reports tolerating previous visit: Min soreness  Patient reports their pain to be 6/10 on a 0-10 scale with 0 being no pain and 10 being the worst pain imaginable.  Pain Location: LB B\  Social History:  lives alone in shotgun home, has hand rails and 3 steps into house  Occupation: retired  Leisure: visit with friends   Pt goals: " decrease pain"     Objective     Baseline IM Testing Results:   Date of testin24  ROM 12=30 deg   Max Peak Torque 92    Min Peak Torque 69    Flex/Ext Ratio 1.3:1   % below normative data 23     Mid IM Testing Results:   Date of testin24  ROM  deg   Max Peak Torque 117   Min Peak Torque 75   Flex/Ext Ratio 1.5:1   % below normative data 9     11% strength gain  OUTCOMES SELECTION:   Intake Outcome Measure for FOTO Lumbar Survey     Therapist reviewed FOTO scores for Chantal Ridley on 2024.   FOTO documents entered into [employer] - see Media section.     Intake Score: 13% functional ability  visit 6 9%  Goal Score: 30% functional ability         Treatment    Chantla received the " treatments listed below:      Chantal received neuromuscular education  to isolate and engage spinal stabilization musculature correctly for motor control and coordination to aid in function and posture for 10 minutes on the Medical Medx Machine.  Patient performed MedX dynamic exercise with emphasis on spinal muscular control using pacer throughout  active range of motion. Therapist assisted patient in achieving optimal exertion for neural reeducation and endurance training by using the  Nandini Exertion Rating scale, by instructing the patient to aim for mid range of exertion, performing 15-20 repetitions, slowly, correctly,and safely.           6/18/2024     2:34 PM   [facility]   Visit Number 10   VAS Pain Rating 7   Time 6   Lumbar Stretches - Slouch Overcorrection 10   Flexion in Lying 10   Lumbar Flexion 39   Lumbar Extension 9   Lumbar Peak Torque 117 ft. lbs.   Min Torque 75   Test Percent Below Normative Data 8 %   Test Percent Gain in Strength from Initial  11 %   Ice - Z Lie (in min.) 5             therapeutic exercises to develop strength, endurance, ROM, flexibility, posture, and core stabilization for 50 minutes including:    LTR 10x  BKTC c/ ball  PPT 10x  TrA activation with T-ball x 10  Bridging x 10 (minimal lift off) c/ RTB  BKFO c/ RTB 10x  +Long axis distraction R LE  + SOC x 10  HSS 2x30   Piriformis stretch 2x 30  Standing abd /ext 10x  LAQ 2# 20x R  Seated hip flex 2# 10 reps  Seated knee extension 20x 2# R      Peripheral muscle strengthening which included 1 set of 15-20 repetitions at a slow, controlled 10-13 second per rep pace focused on strengthening supporting musculature for improved body mechanics and functional mobility.  Pt and therapist focused on proper form during treatment to ensure optimal strengthening of each targeted muscle group.  She was able to complete full circuit of peripheral machines today with increased time.    cold pack for 5 minutes to LB.    Home Exercises  Provided and Patient Education Provided   Home exercises include:LTR, bridge, laq, ball squeeze, walking  Cardio program:5/16/24 and encouraged walking in home 5/30/24  Lifting education date:TBD [facility] Discharge handout (date given):  Equipment at home/gym membership: no      Education provided:   -  cues w/exs  - MedX performance  - Precor ex performance  - HB Protocol  -walking more in home, moving    Written Home Exercises Provided: Patient instructed to cont prior HEP.  Exercises were reviewed and Chantal was able to demonstrate them prior to the end of the session.  Chantal demonstrated fair understanding of the education provided.     See EMR under Patient Instructions for exercises provided 5/30/24    Assessment   Patient has attended 10 visits at [facility] which included MD evaluation, PT evaluation with isometric testing, and physical therapy treatment including HEP instruction, education, aerobic activity, dynamic strengthening on MedX equipment for the spine, and whole body strengthening on MedX equipment with increasing resistance. Patient demonstrates increased ability to reduce symptoms, improve posture, improve ROM, and improve strength, as stated below:  -Improved 9 ROM, initially on MedX test 12-30 and currently 9-39.  -Improved strength at each test point on lumbar MedX isometric test with 11% average improvement noted with reduced pain noted by patient.  -Initial outcome tool score 13 and current outcome tool score 9 indicating reduced pain and improved function.      Patient is making fair progress towards established goals.  Pt will continue to benefit from skilled outpatient physical therapy to address the deficits stated in the impairment chart, provide pt/family education and to maximize pt's level of independence in the home and community environment.     Anticipated Barriers for therapy: transportation/pain level   Pt's spiritual, cultural and educational needs considered and  pt agreeable to plan of care and goals as stated below:       GOALS: Pt is in agreement with the following goals.     Short term goals:  6 weeks or 10 visits   - Pt will demonstrate increased lumbar MedX ROM by at least 3 degrees from the initial ROM value with improvements noted in functional ROM and ability to perform ADLs. MET  - Pt will demonstrate increased MedX average isometric strength value by 20% from initial test resulting in improved ability to perform bending, lifting, and carrying activities safely, confidently. Appropriate and Ongoing  - Pt will report a reduction in worst pain score by 1-2 points for improved tolerance for standing. Appropriate and Ongoing  - Pt able to perform HEP correctly with minimal cueing or supervision from therapist to encourage independent management of symptoms. Appropriate and Ongoing     Long term goals: 10 weeks or 20 visits   - Pt will demonstrate increased lumbar MedX ROM by at least 9 degrees from initial ROM value, resulting in improved ability to perform functional forward bending while standing and sitting.MET  - Pt will demonstrate increased MedX average isometric strength value by 40% from initial test resulting in improved ability to perform bending, lifting, and carrying activities safely and confidently. Appropriate and Ongoing  - Pt to demonstrate ability to independently control and reduce their pain through posture positioning and mechanical movements throughout a typical day. Appropriate and Ongoing  - Pt will demonstrate reduced pain and improved functional outcomes as reported on the FOTO by reaching an intake score of >/= 30% functional ability in order to demonstrate subjective improvement in patient's condition. . Appropriate and Ongoing  - Pt will demonstrate independence with the HEP at discharge. Appropriate and Ongoing  - Pt(patient goal)will be able to walk without AD > 5 minutes without sitting  Appropriate and Ongoing    Plan   Continue with  established Plan of Care towards established PT goals.     Therapist: Paola Temple, PT  6/18/2024

## 2024-06-20 ENCOUNTER — HOSPITAL ENCOUNTER (OUTPATIENT)
Dept: RADIOLOGY | Facility: HOSPITAL | Age: 74
Discharge: HOME | End: 2024-06-20
Attending: ANESTHESIOLOGY
Payer: MEDICARE

## 2024-06-20 ENCOUNTER — CLINICAL SUPPORT (OUTPATIENT)
Dept: REHABILITATION | Facility: HOSPITAL | Age: 74
End: 2024-06-20
Payer: MEDICARE

## 2024-06-20 DIAGNOSIS — M25.69 DECREASED ROM OF TRUNK AND BACK: Primary | ICD-10-CM

## 2024-06-20 DIAGNOSIS — M54.17 LUMBOSACRAL RADICULOPATHY: ICD-10-CM

## 2024-06-20 DIAGNOSIS — M48.061 SPINAL STENOSIS OF LUMBAR REGION WITHOUT NEUROGENIC CLAUDICATION: ICD-10-CM

## 2024-06-20 DIAGNOSIS — M54.16 LUMBAR RADICULOPATHY, CHRONIC: ICD-10-CM

## 2024-06-20 PROCEDURE — 97112 NEUROMUSCULAR REEDUCATION: CPT

## 2024-06-20 PROCEDURE — 97110 THERAPEUTIC EXERCISES: CPT

## 2024-06-20 PROCEDURE — 72148 MRI LUMBAR SPINE W/O DYE: CPT | Mod: 26,,, | Performed by: RADIOLOGY

## 2024-06-20 PROCEDURE — 72148 MRI LUMBAR SPINE W/O DYE: CPT | Mod: TC

## 2024-06-20 NOTE — PROGRESS NOTES
"[facility]      Name: Chantal Ridley  Clinic Number: [id number]    Therapy Diagnosis:   Encounter Diagnosis   Name Primary?    Decreased ROM of trunk and back Yes         Physician: Jeronimo Rodrigez MD    Visit Date: 2024  Physician Orders: PT Eval and Treat  Medical Diagnosis from Referral:   Diagnosis   M47.816 (ICD-10-CM) - Lumbar spondylosis      Evaluation Date: 2024  Authorization Period Expiration: 24  Plan of Care Expiration: 2024  Reassessment Due:7/3/24  Visit # / Visits authorized:  (lumbar roll on MedX)  MedX testing visit 2     Time In:140  Time Out:300  Total Billable Time: 45min   INSURANCE and OUTCOMES: Fee for Service with FOTO Outcomes 2/3     Precautions: standard, HTN, and diabetes     Pattern of pain determined: 1PEN    Subjective   Chantal reports she is getting stronger since she is started.    Patient reports tolerating previous visit: Min soreness  Patient reports their pain to be 6/10 on a 0-10 scale with 0 being no pain and 10 being the worst pain imaginable.  Pain Location: LB B\  Social History:  lives alone in shotgun home, has hand rails and 3 steps into house  Occupation: retired  Leisure: visit with friends   Pt goals: " decrease pain"     Objective     Baseline IM Testing Results:   Date of testin24  ROM 12-30 deg   Max Peak Torque 92    Min Peak Torque 69    Flex/Ext Ratio 1.3:1   % below normative data 23     Mid IM Testing Results:   Date of testin24  ROM  deg   Max Peak Torque 117   Min Peak Torque 75   Flex/Ext Ratio 1.5:1   % below normative data 9     11% strength gain  OUTCOMES SELECTION:   Intake Outcome Measure for FOTO Lumbar Survey     Therapist reviewed FOTO scores for Chantal Ridley on 2024.   FOTO documents entered into [employer] - see Media section.     Intake Score: 13% functional ability  visit 6 9%  Goal Score: 30% functional ability         Treatment    Chantal received the " treatments listed below:      Chantal received neuromuscular education  to isolate and engage spinal stabilization musculature correctly for motor control and coordination to aid in function and posture for 10 minutes on the Medical Medx Machine.  Patient performed MedX dynamic exercise with emphasis on spinal muscular control using pacer throughout  active range of motion. Therapist assisted patient in achieving optimal exertion for neural reeducation and endurance training by using the  Nandini Exertion Rating scale, by instructing the patient to aim for mid range of exertion, performing 15-20 repetitions, slowly, correctly,and safely.           6/20/2024     3:00 PM   [facility]   Visit Number 11   VAS Pain Rating 8   Time 7   Lumbar Stretches - Slouch Overcorrection 10   Flexion in Lying 10   Lumbar Weight 46 lbs   Repetitions 18   Rating of Perceived Exertion 4   Ice - Z Lie (in min.) 5             therapeutic exercises to develop strength, endurance, ROM, flexibility, posture, and core stabilization for 50 minutes including:    LTR 10x  BKTC c/ ball  PPT 10x  TrA activation with T-ball x 10  Bridging x 10 (minimal lift off) c/ RTB  BKFO c/ RTB 10x  +Long axis distraction R LE  + SOC x 10  HSS 2x30   Piriformis stretch 2x 30  Standing abd /ext 10x  LAQ 2# 20x R  Seated hip flex 2# 10 reps  Seated knee extension 20x 2# R      Peripheral muscle strengthening which included 1 set of 15-20 repetitions at a slow, controlled 10-13 second per rep pace focused on strengthening supporting musculature for improved body mechanics and functional mobility.  Pt and therapist focused on proper form during treatment to ensure optimal strengthening of each targeted muscle group.  She was able to complete full circuit of peripheral machines today with increased time.    cold pack for 5 minutes to LB.    Home Exercises Provided and Patient Education Provided   Home exercises include:LTR, bridge, laq, ball squeeze,  walking  Cardio program:5/16/24 and encouraged walking in home 5/30/24  Lifting education date:TBD [place] Discharge handout (date given):  Equipment at home/gym membership: no      Education provided:   -  cues w/exs  - MedX performance  - Precor ex performance  - HB Protocol  -walking more in home, moving    Written Home Exercises Provided: Patient instructed to cont prior HEP.  Exercises were reviewed and Chantal was able to demonstrate them prior to the end of the session.  Chantal demonstrated fair understanding of the education provided.     See EMR under Patient Instructions for exercises provided 5/30/24    Assessment   Pt arrives with her typical amount of pain, especially in the R LE.  Pt has noted she is able to lift her R LE easier and transfer easier since starting program.   Pt continued with lumbo pelvic stabilization exercises without increase in symptoms.  Pt continued with Med X weight of 46ft/lbs with completion of 18 reps at 4/10 exertion level.  Continue to work on LE /core strength and overall mobility skills.  Anticipated Barriers for therapy: transportation/pain level   Pt's spiritual, cultural and educational needs considered and pt agreeable to plan of care and goals as stated below:       GOALS: Pt is in agreement with the following goals.     Short term goals:  6 weeks or 10 visits   - Pt will demonstrate increased lumbar MedX ROM by at least 3 degrees from the initial ROM value with improvements noted in functional ROM and ability to perform ADLs. MET  - Pt will demonstrate increased MedX average isometric strength value by 20% from initial test resulting in improved ability to perform bending, lifting, and carrying activities safely, confidently. Appropriate and Ongoing  - Pt will report a reduction in worst pain score by 1-2 points for improved tolerance for standing. Appropriate and Ongoing  - Pt able to perform HEP correctly with minimal cueing or supervision from therapist to  encourage independent management of symptoms. Appropriate and Ongoing     Long term goals: 10 weeks or 20 visits   - Pt will demonstrate increased lumbar MedX ROM by at least 9 degrees from initial ROM value, resulting in improved ability to perform functional forward bending while standing and sitting.MET  - Pt will demonstrate increased MedX average isometric strength value by 40% from initial test resulting in improved ability to perform bending, lifting, and carrying activities safely and confidently. Appropriate and Ongoing  - Pt to demonstrate ability to independently control and reduce their pain through posture positioning and mechanical movements throughout a typical day. Appropriate and Ongoing  - Pt will demonstrate reduced pain and improved functional outcomes as reported on the FOTO by reaching an intake score of >/= 30% functional ability in order to demonstrate subjective improvement in patient's condition. . Appropriate and Ongoing  - Pt will demonstrate independence with the HEP at discharge. Appropriate and Ongoing  - Pt(patient goal)will be able to walk without AD > 5 minutes without sitting  Appropriate and Ongoing    Plan   Continue with established Plan of Care towards established PT goals.     Therapist: Paola Temple, PT  6/20/2024

## 2024-06-25 ENCOUNTER — CLINICAL SUPPORT (OUTPATIENT)
Dept: REHABILITATION | Facility: HOSPITAL | Age: 74
End: 2024-06-25
Payer: MEDICARE

## 2024-06-25 DIAGNOSIS — M25.69 DECREASED ROM OF TRUNK AND BACK: Primary | ICD-10-CM

## 2024-06-25 PROCEDURE — 97110 THERAPEUTIC EXERCISES: CPT | Performed by: PHYSICAL MEDICINE & REHABILITATION

## 2024-06-25 PROCEDURE — 97112 NEUROMUSCULAR REEDUCATION: CPT | Performed by: PHYSICAL MEDICINE & REHABILITATION

## 2024-06-25 NOTE — PROGRESS NOTES
"  [facility]      Name: Chantal Ridley  Clinic Number: [id number]    Therapy Diagnosis:   Encounter Diagnosis   Name Primary?    Decreased ROM of trunk and back Yes           Physician: Jeronimo Rodrigez MD    Visit Date: 6/25/2024  Physician Orders: PT Eval and Treat  Medical Diagnosis from Referral:   Diagnosis   M47.816 (ICD-10-CM) - Lumbar spondylosis      Evaluation Date: 4/18/2024  Authorization Period Expiration: 12/31/24  Plan of Care Expiration: 6/27/2024 sent 6/25/24-8/24/24  Reassessment Due:7/25/24  Visit # / Visits authorized: 13/20 (lumbar roll on MedX- used precor for this visit due to flare up and ease of getting on and off machine)  MedX testing visit 2     Time In:200  Time Out:300  Total Billable Time: 60 min 1 on 1  INSURANCE and OUTCOMES: Fee for Service with FOTO Outcomes 2/3     Precautions: standard, HTN, and diabetes     Pattern of pain determined: 1PEN    Subjective   Chantal reports she is getting stronger since she is started.   She has gone out more and thinks her function has improved.  However she continues to have significant and sometimes severe back, right buttock, right groin, right anterior and lateral pain to the knee.  Worse with walking, standing, sitting.  She is having trouble sleeping.  She reports she feels strongly it is not from her hip and she doesn't want to consider any hip surgery.  She attends today reporting fairly severe pain, 8/10.  Somewhat better after therapy.  She is trying to do more exercise at home.  She attends using cane which she usually uses.    Patient reports tolerating previous visit: Min soreness  Patient reports their pain to be 8/10 on a 0-10 scale with 0 being no pain and 10 being the worst pain imaginable.  Pain Location: low back, right hip  Social History:  lives alone in shotgun home, has hand rails and 3 steps into house  Occupation: retired  Leisure: visit with friends   Pt goals: " decrease pain" "     Objective     Baseline IM Testing Results:   Date of testin24  ROM 12-30 deg   Max Peak Torque 92    Min Peak Torque 69    Flex/Ext Ratio 1.3:1   % below normative data 23     Mid IM Testing Results:   Date of testin24  ROM  deg   Max Peak Torque 117   Min Peak Torque 75   Flex/Ext Ratio 1.5:1   % below normative data 9 with 11% gain in strength       MOVEMENT LOSS - Lumbar    Norms ROM Loss Initial 24   Flexion Fingers touch toes, sacral angle >/= 70 deg, uniform spinal curvature, posterior weight shift  moderate loss and major loss Mod loss   Extension ASIS surpasses toes, spine of scapulae surpasses heels, uniform spinal curve major loss, inc pain LB/RLE Major loss   Side glide Right   moderate loss Mod loss   Side glide Left   moderate loss Mod loss   Rotation Right PT observes contralateral shoulder major loss Mod loss   Rotation Left PT observes contralateral shoulder major loss Mod loss      Right hip: pain with flex/abd/int rot/and ext rotation  Difficulty putting socks and shoes on  Unable to SLS without support    OUTCOMES SELECTION:   Intake Outcome Measure for FOTO Lumbar Survey     Therapist reviewed FOTO scores for Chantal Ridley on 2024.   FOTO documents entered into [employer] - see Media section.     Intake Score: 13% functional ability  visit 6 9%  Goal Score: 30% functional ability         Treatment    Chantal received the treatments listed below:      Chantal received neuromuscular education  to isolate and engage spinal stabilization musculature correctly for motor control and coordination to aid in function and posture for 10 minutes on the Medical Medx Machine.  Patient performed MedX dynamic exercise with emphasis on spinal muscular control using pacer throughout  active range of motion. Therapist assisted patient in achieving optimal exertion for neural reeducation and endurance training by using the  Nandini Exertion Rating scale, by instructing the patient to aim  for mid range of exertion, performing 15-20 repetitions, slowly, correctly,and safely.           6/25/2024     6:45 PM   [facility]   Visit Number 13   VAS Pain Rating 5   Time 7   Lumbar Stretches - Slouch Overcorrection 10   Flexion in Lying 10   Lumbar Weight 45 lbs used precor today   Repetitions 20   Rating of Perceived Exertion 4   Ice - Z Lie (in min.) 5            therapeutic exercises to develop strength, endurance, ROM, flexibility, posture, and core stabilization for 50 minutes including:    LTR 10x  BKTC c/ ball  PPT 10x  TrA activation with T-ball x 10  Bridging x 10 (minimal lift off) c/ RTB  BKFO c/ RTB 10x  +Long axis distraction R LE  + SOC x 10  HSS 2x30   Piriformis stretch 2x 30  Standing abd /ext 10x  LAQ 2# 20x R  Seated hip flex 2# 10 reps  Seated knee extension 20x 2# R      Peripheral muscle strengthening which included 1 set of 15-20 repetitions at a slow, controlled 10-13 second per rep pace focused on strengthening supporting musculature for improved body mechanics and functional mobility.  Pt and therapist focused on proper form during treatment to ensure optimal strengthening of each targeted muscle group.  She was able to complete full circuit of peripheral machines today with increased time.    cold pack for 5 minutes to LB.    Home Exercises Provided and Patient Education Provided   Home exercises include:LTR, bridge, laq, ball squeeze, walking  Cardio program:5/16/24 and encouraged walking in home 5/30/24  Lifting education date:TBD [employer] Discharge handout (date given):  Equipment at home/gym membership: no      Education provided:   -  cues w/exs  - MedX performance  - Precor ex performance  - HB Protocol  -walking more in home, moving    Written Home Exercises Provided: Patient instructed to cont prior HEP.  Exercises were reviewed and Chantal was able to demonstrate them prior to the end of the session.  Chantal demonstrated fair understanding of the education  provided.     See EMR under Patient Instructions for exercises provided 5/30/24    Assessment   Pt arrives with her significant pain reporting that she has had a flare up since this weekend, she is unsure why but reports this happens.  Noted overall improvement in back motion since starting, and overall improvement in function and ability to go out of the home, but she still notes significant pain.  Noted pain with right hip mobility, patient having difficulty in sitting bringing right foot up to put socks and shoes on.  She states her hip is not an issue, but she presents with reduced and painful right hip mobility, pain in the right groin with walking.  Back mobility and strength is slowly improving. Continued therapy for back mobility and stability and she does respond somewhat to stretching of hip and gentle traction with improved symptoms after visit.    Pt continued with lumbo pelvic stabilization exercises without increase in symptoms.  Pt continued with back strengthening but used precor for ease of in and out and not having to wait for  Med X weight of 45ft/lbs with completion of 20 reps at 4/10 exertion level.  Continue to work on LE /core strength and overall mobility skills.  Anticipated Barriers for therapy: transportation/pain level   Pt's spiritual, cultural and educational needs considered and pt agreeable to plan of care and goals as stated below:       GOALS: Pt is in agreement with the following goals.     Short term goals:  6 weeks or 10 visits   - Pt will demonstrate increased lumbar MedX ROM by at least 3 degrees from the initial ROM value with improvements noted in functional ROM and ability to perform ADLs. MET  - Pt will demonstrate increased MedX average isometric strength value by 20% from initial test resulting in improved ability to perform bending, lifting, and carrying activities safely, confidently. Appropriate and Ongoing  - Pt will report a reduction in worst pain score by 1-2  points for improved tolerance for standing. Appropriate and Ongoing  - Pt able to perform HEP correctly with minimal cueing or supervision from therapist to encourage independent management of symptoms. Appropriate and Ongoing     Long term goals: 10 weeks or 20 visits   - Pt will demonstrate increased lumbar MedX ROM by at least 9 degrees from initial ROM value, resulting in improved ability to perform functional forward bending while standing and sitting.MET  - Pt will demonstrate increased MedX average isometric strength value by 40% from initial test resulting in improved ability to perform bending, lifting, and carrying activities safely and confidently. Appropriate and Ongoing  - Pt to demonstrate ability to independently control and reduce their pain through posture positioning and mechanical movements throughout a typical day. Appropriate and Ongoing  - Pt will demonstrate reduced pain and improved functional outcomes as reported on the FOTO by reaching an intake score of >/= 30% functional ability in order to demonstrate subjective improvement in patient's condition. . Appropriate and Ongoing  - Pt will demonstrate independence with the HEP at discharge. Appropriate and Ongoing  - Pt(patient goal)will be able to walk without AD > 5 minutes without sitting  Appropriate and Ongoing    Plan   Continue with established Plan of Care towards established PT goals.     Therapist: Laura Diaz, PT  6/25/2024

## 2024-06-26 PROCEDURE — 99999PBSHW PR PBB SHADOW TECHNICAL ONLY FILED TO HB: Mod: PBBFAC,,,

## 2024-06-26 RX ORDER — TRIAMCINOLONE ACETONIDE 40 MG/ML
40 INJECTION, SUSPENSION INTRA-ARTICULAR; INTRAMUSCULAR
Status: COMPLETED | OUTPATIENT
Start: 2024-06-26 | End: 2024-06-26

## 2024-06-26 RX ADMIN — TRIAMCINOLONE ACETONIDE 40 MG: 40 INJECTION, SUSPENSION INTRA-ARTICULAR; INTRAMUSCULAR at 02:06

## 2024-06-26 NOTE — PLAN OF CARE
[facility]  Physical Therapy Plan of Care Note         Name: Chantal Ridley  Clinic Number: [id number]    Therapy Diagnosis:   Encounter Diagnosis   Name Primary?    Decreased ROM of trunk and back Yes           Physician: Jeronimo Rodrigez MD    Visit Date: 6/25/2024  Physician Orders: PT Eval and Treat  Medical Diagnosis from Referral:   Diagnosis   M47.816 (ICD-10-CM) - Lumbar spondylosis      Evaluation Date: 4/18/2024  Authorization Period Expiration: 12/31/24  Plan of Care Expiration: 6/27/2024 sent 6/25/24-8/24/24  Reassessment Due:7/25/24  Visit # / Visits authorized: 13/20 (lumbar roll on MedX- used precor for this visit due to flare up and ease of getting on and off machine)  MedX testing visit 2     Time In:200  Time Out:300  Total Billable Time: 60 min 1 on 1  INSURANCE and OUTCOMES: Fee for Service with FOTO Outcomes 2/3     Precautions: standard, HTN, and diabetes     Pattern of pain determined: 1PEN    Subjective   Chantal reports she is getting stronger since she is started.   She has gone out more and thinks her function has improved.  However she continues to have significant and sometimes severe back, right buttock, right groin, right anterior and lateral pain to the knee.  Worse with walking, standing, sitting.  She is having trouble sleeping.  She reports she feels strongly it is not from her hip and she doesn't want to consider any hip surgery.  She attends today reporting fairly severe pain, 8/10.  Somewhat better after therapy.  She is trying to do more exercise at home.  She attends using cane which she usually uses.    Patient reports tolerating previous visit: Min soreness  Patient reports their pain to be 8/10 on a 0-10 scale with 0 being no pain and 10 being the worst pain imaginable.  Pain Location: low back, right hip  Social History:  lives alone in shotgun home, has hand rails and 3 steps into house  Occupation: retired  Leisure: visit with friends   Pt  "goals: " decrease pain"     Objective     Baseline IM Testing Results:   Date of testin24  ROM 12-30 deg   Max Peak Torque 92    Min Peak Torque 69    Flex/Ext Ratio 1.3:1   % below normative data 23     Mid IM Testing Results:   Date of testin24  ROM  deg   Max Peak Torque 117   Min Peak Torque 75   Flex/Ext Ratio 1.5:1   % below normative data 9 with 11% gain in strength       MOVEMENT LOSS - Lumbar    Norms ROM Loss Initial 24   Flexion Fingers touch toes, sacral angle >/= 70 deg, uniform spinal curvature, posterior weight shift  moderate loss and major loss Mod loss   Extension ASIS surpasses toes, spine of scapulae surpasses heels, uniform spinal curve major loss, inc pain LB/RLE Major loss   Side glide Right   moderate loss Mod loss   Side glide Left   moderate loss Mod loss   Rotation Right PT observes contralateral shoulder major loss Mod loss   Rotation Left PT observes contralateral shoulder major loss Mod loss      Right hip: pain with flex/abd/int rot/and ext rotation  Difficulty putting socks and shoes on  Unable to SLS without support    OUTCOMES SELECTION:   Intake Outcome Measure for FOTO Lumbar Survey     Therapist reviewed FOTO scores for Chantal Ridley on 2024.   FOTO documents entered into [employer] - see Media section.     Intake Score: 13% functional ability  visit 6 9%  Goal Score: 30% functional ability           Assessment   Pt arrives with her significant pain reporting that she has had a flare up since this weekend, she is unsure why but reports this happens.  Noted overall improvement in back motion since starting, and overall improvement in function and ability to go out of the home, but she still notes significant pain.  Noted pain with right hip mobility, patient having difficulty in sitting bringing right foot up to put socks and shoes on.  She states her hip is not an issue, but she presents with reduced and painful right hip mobility, pain in the right " groin with walking.  Back mobility and strength is slowly improving. Continued therapy for back mobility and stability and she does respond somewhat to stretching of hip and gentle traction with improved symptoms after visit.    Pt continued with lumbo pelvic stabilization exercises without increase in symptoms.  Pt continued with back strengthening but used precor for ease of in and out and not having to wait for  Med X weight of 45ft/lbs with completion of 20 reps at 4/10 exertion level.  Continue to work on LE /core strength and overall mobility skills.  Anticipated Barriers for therapy: transportation/pain level   Pt's spiritual, cultural and educational needs considered and pt agreeable to plan of care and goals as stated below:       GOALS: Pt is in agreement with the following goals.     Short term goals:  6 weeks or 10 visits   - Pt will demonstrate increased lumbar MedX ROM by at least 3 degrees from the initial ROM value with improvements noted in functional ROM and ability to perform ADLs. MET  - Pt will demonstrate increased MedX average isometric strength value by 20% from initial test resulting in improved ability to perform bending, lifting, and carrying activities safely, confidently. Appropriate and Ongoing  - Pt will report a reduction in worst pain score by 1-2 points for improved tolerance for standing. Appropriate and Ongoing  - Pt able to perform HEP correctly with minimal cueing or supervision from therapist to encourage independent management of symptoms. Appropriate and Ongoing     Long term goals: 10 weeks or 20 visits   - Pt will demonstrate increased lumbar MedX ROM by at least 9 degrees from initial ROM value, resulting in improved ability to perform functional forward bending while standing and sitting.MET  - Pt will demonstrate increased MedX average isometric strength value by 40% from initial test resulting in improved ability to perform bending, lifting, and carrying activities  safely and confidently. Appropriate and Ongoing  - Pt to demonstrate ability to independently control and reduce their pain through posture positioning and mechanical movements throughout a typical day. Appropriate and Ongoing  - Pt will demonstrate reduced pain and improved functional outcomes as reported on the FOTO by reaching an intake score of >/= 30% functional ability in order to demonstrate subjective improvement in patient's condition. . Appropriate and Ongoing  - Pt will demonstrate independence with the HEP at discharge. Appropriate and Ongoing  - Pt(patient goal)will be able to walk without AD > 5 minutes without sitting  Appropriate and Ongoing      PLAN     Updated Certification Period: 6/25/24 to 8/25/24   Recommended Treatment Plan: healthy back     Laura Diaz, PT

## 2024-06-26 NOTE — PROGRESS NOTES
Subjective:      Patient ID: Chantal Ridley is a 74 y.o. female.    Chief Complaint:   Diabetes Mellitus and Nail Care (PCP-[facility] - Juliette Hameed, DARA-4/24/2024)    Chantal is a 74 y.o. female who presents to the clinic upon referral from Dr. Veronica parham. provider found  for evaluation and treatment of diabetic feet. Chantal has a past medical history of Diabetes mellitus, Fibromyalgia, degenerative disc disease, and Hypertension. Patient relates no major problem with feet.  Injections have been helping for nerve pain bilateral.  She is here for routine diabetic foot evaluation as well as increased bilateral foot joint pain.  She has had issues of chronic pain  PCP: [facility] -     Date Last Seen by PCP:   Chief Complaint   Patient presents with    Diabetes Mellitus    Nail Care     [facility] - Juliette Hameed, DARA-4/24/2024         Current shoe gear: Casual shoes    Hemoglobin A1C   Date Value Ref Range Status   09/20/2022 6.3 (H) 4.5 - 5.7 % Final   02/14/2012 9.8 (H) 4.0 - 6.2 % Final         Review of Systems   Constitutional: Negative for chills, decreased appetite, fever and malaise/fatigue.   HENT:  Negative for congestion, hearing loss, nosebleeds and tinnitus.    Eyes:  Negative for double vision, pain, photophobia and visual disturbance.   Cardiovascular:  Negative for chest pain, claudication, cyanosis and leg swelling.   Respiratory:  Negative for cough, hemoptysis, shortness of breath and wheezing.    Endocrine: Negative for cold intolerance and heat intolerance.   Hematologic/Lymphatic: Negative for adenopathy and bleeding problem.   Skin:  Negative for color change, dry skin, itching, nail changes and suspicious lesions.   Musculoskeletal:  Positive for joint pain and stiffness. Negative for arthritis and myalgias.   Gastrointestinal:  Negative for abdominal pain, jaundice, nausea and vomiting.   Genitourinary:  Negative for dysuria,  frequency and hematuria.   Neurological:  Positive for numbness, paresthesias and sensory change. Negative for difficulty with concentration and loss of balance.   Psychiatric/Behavioral:  Negative for altered mental status, hallucinations and suicidal ideas. The patient is not nervous/anxious.    Allergic/Immunologic: Negative for environmental allergies and persistent infections.           Objective:      Physical Exam  Vitals reviewed.   Constitutional:       Appearance: She is well-developed.   HENT:      Head: Normocephalic and atraumatic.   Cardiovascular:      Pulses:           Dorsalis pedis pulses are 2+ on the right side and 2+ on the left side.        Posterior tibial pulses are 2+ on the right side and 2+ on the left side.   Pulmonary:      Effort: Pulmonary effort is normal.   Musculoskeletal:         General: Normal range of motion.      Comments: Inspection and palpation of the muscles joints and bones of both lower extremities reveal that muscle strength for the anterior lateral and posterior muscle groups and intrinsic muscle groups of the foot are all 5 over 5 symmetrical.     Tenderness to the right and left 1st metatarsal cuneiform joint.   Skin:     General: Skin is warm and dry.      Capillary Refill: Capillary refill takes 2 to 3 seconds.      Comments: Skin turgor is normal bilaterally.  Skin texture is well hydrated to both lower extremities.  No lesions or rashes or wounds appreciated bilaterally.  Nail plates 1 through 5 bilaterally are within normal limits for length and thickness.  No nail clubbing or incurvation noted.   Neurological:      Mental Status: She is alert and oriented to person, place, and time.      Comments: Sharp dull light touch vibratory proprioceptive sensation are intact bilaterally.  Deep tendon reflexes to patellar and Achilles tendon are symmetrical 2 over 4 bilaterally.  No ankle clonus or Babinski reflexes noted bilaterally.  Coordination is normal to both feet  and lower extremities.  Positive Tinel sign/provocation sign right tarsal tunnel.   Psychiatric:         Behavior: Behavior normal.               Assessment:       Encounter Diagnoses   Name Primary?    Lumbosacral radiculopathy Yes    Nerve entrapment of lower limb, right     Diabetes mellitus due to underlying condition with hyperosmolarity without coma, without long-term current use of insulin      Independent visualization of imaging was performed.  Results were reviewed in detail with patient.       Plan:       Chantal was seen today for diabetes mellitus and nail care.    Diagnoses and all orders for this visit:    Lumbosacral radiculopathy  -     Ambulatory referral/consult to Pain Clinic; Future  -     ORTHOTIC DEVICE (DME)    Nerve entrapment of lower limb, right  -     Ambulatory referral/consult to Pain Clinic; Future  -     ORTHOTIC DEVICE (DME)    Diabetes mellitus due to underlying condition with hyperosmolarity without coma, without long-term current use of insulin  -     ORTHOTIC DEVICE (DME)      I counseled the patient on her conditions, their implications and medical management.    The nature of the condition, options for management, as well as potential risks and complications were discussed in detail with patient. Patient was amenable to my recommendations and left my office fully informed and will follow up as instructed or sooner if necessary.      Decision making:  Chronic illnesses discussed in detail, previous records/notes and imaging independently reviewed, prescription drug management performed in addition to lengthy discussion regarding both conservative and surgical treatment options.      Discussed options for peripheral neuropathy/nerve entrapment syndrome including nerve block therapy, surgical nerve entrapment decompression procedures, and various vitamins and supplementation available shown to improve nerve function.    Procedure:  Corticosteroid injection bilateral foot  Surgeon:   Robert awan DPM  A steroid injection was performed at right 1st metatarsal cuneiform joint using 1% plain Lidocaine and 1 mL/40 mg of Kenalog. This was well tolerated.      Shoe inspection. Diabetic Foot Education. Patient reminded of the importance of good nutrition and blood sugar control to help prevent podiatric complications of diabetes. Patient instructed on proper foot hygeine. We discussed wearing proper shoe gear, daily foot inspections and Diabetic foot education in detail.    Return to clinic in 3-6 months or sooner if problems arise

## 2024-06-27 ENCOUNTER — CLINICAL SUPPORT (OUTPATIENT)
Dept: REHABILITATION | Facility: HOSPITAL | Age: 74
End: 2024-06-27
Payer: MEDICARE

## 2024-06-27 DIAGNOSIS — M25.69 DECREASED ROM OF TRUNK AND BACK: Primary | ICD-10-CM

## 2024-06-27 PROCEDURE — 97112 NEUROMUSCULAR REEDUCATION: CPT

## 2024-06-27 PROCEDURE — 97110 THERAPEUTIC EXERCISES: CPT

## 2024-06-27 NOTE — PROGRESS NOTES
"  [facility]      Name: Chantal Ridley  Clinic Number: [id number]    Therapy Diagnosis:   Encounter Diagnosis   Name Primary?    Decreased ROM of trunk and back Yes           Physician: Jeronimo Rodrigez MD    Visit Date: 2024  Physician Orders: PT Eval and Treat  Medical Diagnosis from Referral:   Diagnosis   M47.816 (ICD-10-CM) - Lumbar spondylosis      Evaluation Date: 2024  Authorization Period Expiration: 24  Plan of Care Expiration: 2024 sent 24-24  Reassessment Due:24  Visit # / Visits authorized:  (lumbar roll on MedX- used precor for this visit due to flare up and ease of getting on and off machine)  MedX testing visit 2     Time In:215  Time Out:315  Total Billable Time: 30min 1 on 1  INSURANCE and OUTCOMES: Fee for Service with FOTO Outcomes 2/3     Precautions: standard, HTN, and diabetes     Pattern of pain determined: 1PEN    Subjective   Chantal reports she is getting stronger since she is started.  Pt continues to report severe pain and gait is antalgic     Patient reports tolerating previous visit: Min soreness  Patient reports their pain to be 8/10 on a 0-10 scale with 0 being no pain and 10 being the worst pain imaginable.  Pain Location: low back, right hip  Social History:  lives alone in shotgun home, has hand rails and 3 steps into house  Occupation: retired  Leisure: visit with friends   Pt goals: " decrease pain"     Objective     Baseline IM Testing Results:   Date of testin24  ROM 12-30 deg   Max Peak Torque 92    Min Peak Torque 69    Flex/Ext Ratio 1.3:1   % below normative data 23     Mid IM Testing Results:   Date of testin24  ROM  deg   Max Peak Torque 117   Min Peak Torque 75   Flex/Ext Ratio 1.5:1   % below normative data 9 with 11% gain in strength       MOVEMENT LOSS - Lumbar    Norms ROM Loss Initial 24   Flexion Fingers touch toes, sacral angle >/= 70 deg, uniform spinal curvature, " posterior weight shift  moderate loss and major loss Mod loss   Extension ASIS surpasses toes, spine of scapulae surpasses heels, uniform spinal curve major loss, inc pain LB/RLE Major loss   Side glide Right   moderate loss Mod loss   Side glide Left   moderate loss Mod loss   Rotation Right PT observes contralateral shoulder major loss Mod loss   Rotation Left PT observes contralateral shoulder major loss Mod loss      Right hip: pain with flex/abd/int rot/and ext rotation  Difficulty putting socks and shoes on  Unable to SLS without support    OUTCOMES SELECTION:   Intake Outcome Measure for FOTO Lumbar Survey     Therapist reviewed FOTO scores for Chantal Ridley on 4/18/2024.   FOTO documents entered into [employer] - see Media section.     Intake Score: 13% functional ability  visit 6 9%  Goal Score: 30% functional ability         Treatment    Chantal received the treatments listed below:      Chantal received neuromuscular education  to isolate and engage spinal stabilization musculature correctly for motor control and coordination to aid in function and posture for 10 minutes on the Medical Medx Machine.  Patient performed MedX dynamic exercise with emphasis on spinal muscular control using pacer throughout  active range of motion. Therapist assisted patient in achieving optimal exertion for neural reeducation and endurance training by using the  Nandini Exertion Rating scale, by instructing the patient to aim for mid range of exertion, performing 15-20 repetitions, slowly, correctly,and safely.             6/27/2024     3:19 PM   [facility]   Visit Number 13   VAS Pain Rating 8   Time 10   Lumbar Stretches - Slouch Overcorrection 10   Flexion in Lying 10   Lumbar Weight 45 lbs   Repetitions 20   Rating of Perceived Exertion 4   Ice - Z Lie (in min.) 5         therapeutic exercises to develop strength, endurance, ROM, flexibility, posture, and core stabilization for 50 minutes including:    LTR 10x  BKTC  c/ ball  PPT 10x  TrA activation with T-ball x 10  Bridging x 10 (minimal lift off) c/ RTB  BKFO c/ RTB 10x  +Long axis distraction R LE  + SOC x 10  HSS 2x30   Piriformis stretch 2x 30  Standing abd /ext 10x  LAQ 2# 20x R  Seated hip flex 2# 10 reps  Seated knee extension 20x 2# R      Peripheral muscle strengthening which included 1 set of 15-20 repetitions at a slow, controlled 10-13 second per rep pace focused on strengthening supporting musculature for improved body mechanics and functional mobility.  Pt and therapist focused on proper form during treatment to ensure optimal strengthening of each targeted muscle group.  She was able to complete full circuit of peripheral machines today with increased time.    cold pack for 5 minutes to LB.    Home Exercises Provided and Patient Education Provided   Home exercises include:LTR, bridge, laq, ball squeeze, walking  Cardio program:5/16/24 and encouraged walking in home 5/30/24  Lifting education date:TBD [employer] Discharge handout (date given):  Equipment at home/gym membership: no      Education provided:   -  cues w/exs  - MedX performance  - Precor ex performance  - HB Protocol  -walking more in home, moving    Written Home Exercises Provided: Patient instructed to cont prior HEP.  Exercises were reviewed and Chantal was able to demonstrate them prior to the end of the session.  Chantal demonstrated fair understanding of the education provided.     See EMR under Patient Instructions for exercises provided 5/30/24    Assessment   Pt arrives with her significant pain and antalgic gait.  Pt continues to c/o groin and anterior thigh pain.   Back mobility and strength is slowly improving, however pt still reporting intense pain with standing, transitional movements and gait. Discussed contacting her doctor in regards to another injection, however pt reports they do not help.  Pt continued with lumbo pelvic stabilization exercises without increase in symptoms.  Pt  continued with back strengthening used precor for ease of in and out and not having to wait for  Med X weight of 45ft/lbs with completion of 20 reps at 4/10 exertion level.  Continue to work on LE /core strength and overall mobility skills.  Anticipated Barriers for therapy: transportation/pain level   Pt's spiritual, cultural and educational needs considered and pt agreeable to plan of care and goals as stated below:       GOALS: Pt is in agreement with the following goals.     Short term goals:  6 weeks or 10 visits   - Pt will demonstrate increased lumbar MedX ROM by at least 3 degrees from the initial ROM value with improvements noted in functional ROM and ability to perform ADLs. MET  - Pt will demonstrate increased MedX average isometric strength value by 20% from initial test resulting in improved ability to perform bending, lifting, and carrying activities safely, confidently. Appropriate and Ongoing  - Pt will report a reduction in worst pain score by 1-2 points for improved tolerance for standing. Appropriate and Ongoing  - Pt able to perform HEP correctly with minimal cueing or supervision from therapist to encourage independent management of symptoms. Appropriate and Ongoing     Long term goals: 10 weeks or 20 visits   - Pt will demonstrate increased lumbar MedX ROM by at least 9 degrees from initial ROM value, resulting in improved ability to perform functional forward bending while standing and sitting.MET  - Pt will demonstrate increased MedX average isometric strength value by 40% from initial test resulting in improved ability to perform bending, lifting, and carrying activities safely and confidently. Appropriate and Ongoing  - Pt to demonstrate ability to independently control and reduce their pain through posture positioning and mechanical movements throughout a typical day. Appropriate and Ongoing  - Pt will demonstrate reduced pain and improved functional outcomes as reported on the FOTO by  reaching an intake score of >/= 30% functional ability in order to demonstrate subjective improvement in patient's condition. . Appropriate and Ongoing  - Pt will demonstrate independence with the HEP at discharge. Appropriate and Ongoing  - Pt(patient goal)will be able to walk without AD > 5 minutes without sitting  Appropriate and Ongoing    Plan   Continue with established Plan of Care towards established PT goals.     Therapist: Paola Temple, PT  6/27/2024

## 2024-07-01 ENCOUNTER — OFFICE VISIT (OUTPATIENT)
Dept: PAIN MEDICINE | Facility: CLINIC | Age: 74
End: 2024-07-01
Attending: ANESTHESIOLOGY
Payer: MEDICARE

## 2024-07-01 VITALS
SYSTOLIC BLOOD PRESSURE: 156 MMHG | BODY MASS INDEX: 25.83 KG/M2 | HEART RATE: 56 BPM | DIASTOLIC BLOOD PRESSURE: 79 MMHG | WEIGHT: 136.69 LBS

## 2024-07-01 DIAGNOSIS — M54.17 LUMBOSACRAL RADICULOPATHY: ICD-10-CM

## 2024-07-01 DIAGNOSIS — M16.11 PRIMARY OSTEOARTHRITIS OF RIGHT HIP: ICD-10-CM

## 2024-07-01 DIAGNOSIS — Z79.891 ENCOUNTER FOR MONITORING OPIOID MAINTENANCE THERAPY: Primary | ICD-10-CM

## 2024-07-01 DIAGNOSIS — G57.81 NERVE ENTRAPMENT OF LOWER LIMB, RIGHT: ICD-10-CM

## 2024-07-01 DIAGNOSIS — Z51.81 ENCOUNTER FOR MONITORING OPIOID MAINTENANCE THERAPY: Primary | ICD-10-CM

## 2024-07-01 DIAGNOSIS — M48.061 SPINAL STENOSIS OF LUMBAR REGION WITHOUT NEUROGENIC CLAUDICATION: ICD-10-CM

## 2024-07-01 DIAGNOSIS — M54.17 LUMBOSACRAL RADICULOPATHY: Primary | ICD-10-CM

## 2024-07-01 DIAGNOSIS — M47.816 LUMBAR SPONDYLOSIS: ICD-10-CM

## 2024-07-01 PROCEDURE — 99215 OFFICE O/P EST HI 40 MIN: CPT | Mod: S$PBB,,,

## 2024-07-01 PROCEDURE — 99999 PR PBB SHADOW E&M-EST. PATIENT-LVL IV: CPT | Mod: PBBFAC,,,

## 2024-07-01 PROCEDURE — 99214 OFFICE O/P EST MOD 30 MIN: CPT | Mod: PBBFAC

## 2024-07-01 RX ORDER — PREGABALIN 150 MG/1
150 CAPSULE ORAL 2 TIMES DAILY
Qty: 60 CAPSULE | Refills: 0 | Status: SHIPPED | OUTPATIENT
Start: 2024-07-01 | End: 2024-07-31

## 2024-07-01 NOTE — PROGRESS NOTES
Chronic patient Established Note (Follow up visit)      SUBJECTIVE:  Interval History 7/1/2024:  Chantal Ridley returns to clinic for follow-up of chronic pain. She is upset today because her appointment with Dr Valdez to discuss pain medication management was rescheduled and she is now seeing the NP.  She reports that the Dilaudid that was previously prescribed, 4 mg TID PRN is not helping and she has not been taking it.  She states she lives in constant pain and can barely walk across her house to make herself food or a cup of coffee. She does not want to proceed with previously discussed SCS trial. She has researched a pain pump through [employer] and wishes to proceed with this procedure.  She would like another referral to Ortho to discuss her hip pain as Dr Faye is no longer performing surgeries.  She would also like a referral to see another NSGY provider although she is not interested in surgery at this time.  She would also like to switch pain providers at [facility] to Dr Rivers at [place] as this is a more convenient location and she states her podiatrist originally wanted her to see Dr Rivers. She had some leftover Percocet and has taken this with some relief.  She would like to switch to Percocet. She also takes Lyrica with some relief and would like to see if she can increase this dose.  She continues the Healthy Back Program with good relief. The patient denies fever/night sweats, urinary incontinence, bowel incontinence, significant weight changes, significant motor weakness or changes, or loss of sensations. Her pain today is 10/10.     Interval History 5/29/2024.   She returns for follow-up.  She said she meets understood and took both oxycodone and Dilaudid.  She does not think the oxycodone helps at all.  She thinks the Dilaudid helps a little.  She has not been using any over-the-counter medications.  She continues on pregabalin.  She has severe pain that is interfering  with her activities of daily living to a great extent.  Says she has difficulties with cooking and hygiene among other things.  Denied having any new bowel or bladder symptomatology.  Pain is mostly around the right hip and right thigh.  She does have lower back pain as well as some radicular symptoms down the right lower extremities with tingling in the great toe.      Interval History 4/9/2024:  The patient is here for follow up of back pain. The pain continues to radiate down the back of the right leg. She had no benefit with HERACLIO in the past. Since previous encounter, she did f/u with Dr. Rodrigez and she did not wish to pursue surgical options at this time. He referred her to Ortho to have her right hip checked. She saw Dr. Faye who does not think that her hip is the issue. He believes that her primary cause is her back, which I agree with. She was given a Toradol shot last month by podiatry which she says did not help her at all for any area of pain. She is starting Healthy Back on 4/18/24. She is taking Percocet 10/325 mg BID which she says does not help very much. She is asking about ITP opioid pain medications. Her pain today is 10/10.    Interval History 2/19/2024:  The patient is here for follow up of right sided back and leg pain. She is s/p right L3/4 and L4/5 TF HERACLIO with no relief, not even short term. She actually feels like it worsened her pain. Leg pain is greater than back pain. She continues to have right sided back pain with radiation down the front of the right leg. She has associated numbness and weakness. She does have some symptoms on the left, but the right side is the worse. She did see Dr. Rodrigez last year who discussed surgery if epidural did not help. [place] She continues to take Percocet BID with mild benefit. She stopped Gabapentin due to minimal benefit. She is requesting Naproxen as she feels like it was helpful in the past. Her pain  today is 10/10.    Interval History 1/16/2024:  Chantal Ridley presents to the clinic for a follow-up appointment for hip pain. She is s/p right hip joint injection on 12/18/2023. She reports relief for 2 weeks. Then, her pain returned to baseline. She continues to report low back pain that radiates into the anterolateral aspect of her right thigh to her knee. She denies any left leg pain. She does have right hip pain. Her pain is worse with standing and walking. She also reports pain with moving from sitting to standing. She is currently taking Percocet as needed with benefit. She denies any adverse effects. She denies any other health changes. Her pain today is 10/10.    HPI  A former patient of Dr. Garland who was on Percocet for chronic pain.  She was given 7.5 to use twice a day.  They had recommended interventions but she declined.  She is interested in interventions if they would help.  She has chronic right lower back pain that radiates to the right groin and thigh.  It also extends this down to the foot anterior and dorsally.  There is sometimes tingling but no numbness.  She does not appreciate weakness.  She is stiff when she gets up and takes her a while to get moving.  There is no imaging of her hip but she thinks she had 1 at [facility].  She had therapy in the remote past and has not been following with the exercises.  She is interested in getting back into therapy.    Pain Disability Index Review:      5/27/2024     1:34 PM 5/1/2024     1:57 PM 4/9/2024     1:57 PM   Last 3 PDI Scores   Pain Disability Index (PDI) 60 70 70       Pain Medications:  Percocet    Opioid Contract: yes     report:  Reviewed and consistent with medication use as prescribed.    Pain Procedures:   12/18/2023- Right hip joint injection  2/5/24 Right L3/4 and L4/5 TF HERACLIO- no relief    Physical Therapy/Home Exercise: yes    Imaging:   Xray Hips 11/21/2023:  CLINICAL HISTORY:  Pain in right hip     FINDINGS:  Two views  right hip: There is severe DJD and impingement change.  No fracture dislocation bone destruction seen.    MRI LUMBAR SPINE WITHOUT CONTRAST     FINDINGS:  Alignment: Leftward curvature of the lumbar spine.  Reversal of curvature thoracolumbar level     Vertebrae: No marrow replacing infiltrative process.  No fracture.     Discs: Disc space narrowing T12-L1 L1-L2 L2-L3 with discogenic degenerative changes at the endplates.     Cord: Normal contour and signal.  Conus terminates at L1-L2     Degenerative findings:     T12-L1: Circumferential disc bulge, ligamentum flavum buckling, and bilateral facet arthropathy.  No spinal canal stenosis or neural foraminal narrowing.     L1-L2: Circumferential disc bulge, ligamentum flavum buckling, and bilateral facet arthropathy.  Findings result in mild spinal canal stenosis.  Partial effacement of the inferior perineural fat adjacent to the bilateral exiting nerve roots, consistent with mild bilateral neural foraminal narrowing.     L2-L3: Circumferential disc bulge, ligamentum flavum buckling, and bilateral facet arthropathy.  Findings result in moderate spinal canal stenosis.  Complete effacement of the right perineural fat consistent with severe right neural foraminal narrowing.  Partial effacement of the anterior fat adjacent to the left exiting nerve root, consistent with mild left neural foraminal narrowing.     L3-L4: Circumferential disc bulge, ligamentum flavum buckling, and bilateral facet arthropathy.  Posterior projecting synovial cyst arising from the right facet.  Severe spinal canal stenosis.  Near complete effacement of the bilateral perineural fat, consistent with moderate bilateral neural foraminal narrowing.     L4-L5: Circumferential disc bulge, ligamentum flavum buckling, and bilateral facet arthropathy.  Moderate-severe spinal canal stenosis.     L5-S1: Circumferential disc bulge, ligamentum flavum buckling, and bilateral facet arthropathy.  No spinal canal  stenosis.  Near complete effacement of the left perineural fat, consistent with moderate left neural foraminal narrowing.  Partial effacement of the perineural fat along the inferior aspect of the exiting right nerve root, consistent with mild right neural foraminal narrowing.     Paraspinal muscles & soft tissues: Unremarkable.     Impression:     1. Degenerative changes of the lumbar spine most pronounced at L3-L4 with severe spinal canal stenosis and moderate bilateral neural foraminal narrowing.  2. Additional findings, as above.     Electronically signed by resident: Franchesca Pierre  Date:                                            06/20/2024  Time:                                           16:38     Electronically signed by:Aj Benitez MD  Date:                                            06/20/2024  Time:                                           16:54    Allergies:   Review of patient's allergies indicates:   Allergen Reactions    Ace inhibitors Anaphylaxis, Itching and Swelling    Amlodipine Swelling    Amoxicillin Anaphylaxis    Erythromycin Swelling and Hives     Tongue swelling    Penicillins Anaphylaxis    Losartan Hives     Headaches. Patient is currently taking Losartan for high b/p and states they are feeling fine.    Tramadol Itching    Olmesartan Rash     Other reaction(s): Skin Rashes / Eruption of skin, Benicar       Current Medications:   Current Outpatient Medications   Medication Sig Dispense Refill    estrogen, conjugated,-medroxyprogesterone 0.625-2.5mg (PREMPRO) 0.625-2.5 mg per tablet Take 1 tablet by mouth once daily.      estrogen, conjugated,-medroxyprogesterone 0.625-2.5mg (PREMPRO) 0.625-2.5 mg per tablet Take 1 tablet by mouth once daily. 30 tablet 12    fluconazole (DIFLUCAN) 150 MG Tab Take 1 tablet by mouth Now, And repeat in 48 hours.      hydrOXYzine pamoate (VISTARIL) 25 MG Cap Take 25 mg by mouth daily as needed.      irbesartan (AVAPRO) 150 MG tablet Take 150 mg by mouth every  evening.      irbesartan (AVAPRO) 300 MG tablet Take 300 mg by mouth.      LIDOcaine (LIDODERM) 5 % Place 1 patch onto the skin once daily. Remove & Discard patch within 12 hours or as directed by MD 30 patch 6    LIDOcaine-prilocaine (EMLA) cream Apply topically as needed.      phenyleph-min oil-petrolatum 0.25-14-74.9 % Oint Place 1 Application rectally.      phenylephrine-cocoa butter 0.25-88.44 % Supp suppository Place 1 suppository rectally.      pregabalin (LYRICA) 75 MG capsule Take 1 capsule (75 mg total) by mouth 2 (two) times daily. 60 capsule 1    sitagliptan-metformin (JANUMET)  mg per tablet Take 1 tablet by mouth 2 (two) times daily with meals.      tiZANidine (ZANAFLEX) 4 MG tablet Take 4 mg by mouth every 8 (eight) hours.      amitriptyline (ELAVIL) 10 MG tablet Take 1 tablet (10 mg total) by mouth nightly as needed for Insomnia. 30 tablet 2    diazePAM (VALIUM) 5 MG tablet Take 1 tablet (5 mg total) by mouth once. Take medication 30 minutes prior to procedure  for 1 dose 1 tablet 0    diclofenac sodium (SOLARAZE) 3 % gel Apply topically 2 (two) times daily. (Patient not taking: Reported on 7/1/2024)      HYDROmorphone (DILAUDID) 4 MG tablet Take 1 tablet (4 mg total) by mouth every 8 (eight) hours as needed for Pain. (Patient not taking: Reported on 7/1/2024) 90 tablet 0     No current facility-administered medications for this visit.     Facility-Administered Medications Ordered in Other Visits   Medication Dose Route Frequency Provider Last Rate Last Admin    0.9%  NaCl infusion   Intravenous Continuous Chris Farmer MD           REVIEW OF SYSTEMS:    GENERAL:  No weight loss, malaise or fevers.  HEENT:  Negative for frequent or significant headaches.  NECK:  Negative for lumps, goiter, pain and significant neck swelling.  RESPIRATORY:  Negative for cough, wheezing or shortness of breath.  CARDIOVASCULAR:  Negative for chest pain, leg swelling or palpitations. HTN  GI:  Negative for  abdominal discomfort, blood in stools or black stools or change in bowel habits.  MUSCULOSKELETAL:  See HPI.  SKIN:  Negative for lesions, rash, and itching.  PSYCH:  Negative for sleep disturbance, mood disorder and recent psychosocial stressors.  ENDO: Diabetes  HEMATOLOGY/LYMPHOLOGY:  Negative for prolonged bleeding, bruising easily or swollen nodes.  NEURO:   No history of headaches, syncope, paralysis, seizures or tremors.  All other reviewed and negative other than HPI.    Past Medical History:  Past Medical History:   Diagnosis Date    Diabetes mellitus     Fibromyalgia     Hx of degenerative disc disease     neck & back    Hypertension        Past Surgical History:  Past Surgical History:   Procedure Laterality Date    CATARACT EXTRACTION W/  INTRAOCULAR LENS IMPLANT  2016    EPIDURAL STEROID INJECTION N/A 2024    Procedure: CAUDAL HERACLIO WITH CATH;  Surgeon: Ike Valdez MD;  Location: [facility];  Service: Pain Management;  Laterality: N/A;  [phone]  2 WK F/U MICHAEL    INJECTION OF JOINT Right 2023    Procedure: INJECTION, JOINT RIGHT HIP;  Surgeon: Ike Valdez MD;  Location: [facility];  Service: Pain Management;  Laterality: Right;  [phone]    TRANSFORAMINAL EPIDURAL INJECTION OF STEROID Right 2024    Procedure: LUMBAR TRANSFORAMINAL RIGHT L3/4 AND L4/5;  Surgeon: Ike Valdez MD;  Location: [facility];  Service: Pain Management;  Laterality: Right;  [phone]  2 WK F/U SIGIFREDO    TUBAL LIGATION         Family History:  No family history on file.    Social History:  Social History     Socioeconomic History    Marital status: Single   Tobacco Use    Smoking status: Former     Current packs/day: 0.00     Types: Cigarettes     Quit date:      Years since quittin.5    Smokeless tobacco: Never   Substance and Sexual Activity    Alcohol use: No    Drug use: No     Social Determinants of Health     Financial Resource Strain: Unknown (2023)    [place] [facility]    Overall Financial Resource Strain (CARDIA)     Difficulty of Paying Living Expenses: Patient declined   Food Insecurity: Unknown (2/6/2023)    [place] [email or web address]    Hunger Vital Sign     Worried About Running Out of Food in the Last Year: Patient declined     Ran Out of Food in the Last Year: Patient declined   Transportation Needs: Unmet Transportation Needs (2/6/2023)    Received from [email or web address]    PRAPARE - Transportation     Lack of Transportation (Medical): No     Lack of Transportation (Non-Medical): Yes   Stress: No Stress Concern Present (2/6/2023)    Received from [email or web address]    [employer] [place] [employer] - Occupational Stress Questionnaire     Feeling of Stress : Not at all   Housing Stability: Unknown (2/6/2023)    Received from [email or web address]    Housing Stability Vital Sign     Unable to Pay for Housing in the Last Year: No     In the last 12 months, was there a time when you did not have a steady place to sleep or slept in a shelter (including now)?: No       OBJECTIVE:    BP (!) 156/79 (BP Location: Right arm, Patient Position: Sitting, BP Method: Small (Automatic))   Pulse (!) 56   Wt 62 kg (136 lb 11 oz)   BMI 25.83 kg/m²     PHYSICAL EXAMINATION:    General appearance: Well appearing, in no acute distress, alert and oriented x3.  Psych:  Mood and affect appropriate.  Skin: Skin color, texture, turgor normal, no rashes or lesions, in both upper and lower body.  Head/face:  Atraumatic, normocephalic. No palpable lymph nodes  Back: Straight leg raising in the sitting position is negative to radicular pain bilaterally. Limited ROM with pain on extension and flexion. Positive facet loading bilaterally.  Extremities: No deformities, edema, or skin discoloration. Good capillary refill.  Musculoskeletal: 4/5 strength in right ankle with plantar and dorsiflexion. 5/5 strength in left ankle with plantar and  dorsiflexion. 4/5 strength with right knee flexion and extension. 5/5 strength with left knee flexion and extension. 5/5 strength in right EHL, 5/5 strength in left EHL.  Neuro: Decreased sensation to RLE.  Gait: Normal    ASSESSMENT: 74 y.o. year old female with low back and hip pain, consistent with the followin. Encounter for monitoring opioid maintenance therapy        2. Lumbosacral radiculopathy  Ambulatory referral/consult to Pain Clinic      3. Nerve entrapment of lower limb, right  Ambulatory referral/consult to Pain Clinic      4. Primary osteoarthritis of right hip  Ambulatory referral/consult to Orthopedics      5. Spinal stenosis of lumbar region without neurogenic claudication  Ambulatory referral/consult to Neurosurgery      6. Lumbar spondylosis  Ambulatory referral/consult to Neurosurgery        PLAN:  We discussed with the patient the assessment and recommendations. The following is the plan we agreed on:  Previous imaging was reviewed and discussed with the patient today.   Patient last filled Hydromorphone 4 mg TID PRN on 2024. She last filled Percocet 10/325 BID PRN on 2024 which had been discontinued by Dr Valdez at her last office visit on 2024. She has not been taking the Hydromorphone, but has been taking the Percocet occasionally.   The patient is here today for a refill of current pain medications and they believe these provide effective pain control and improvements in quality of life.  The patient notes no serious side effects, and feels the benefits outweigh the risks.  The patient was reminded of the pain contract that they signed previously as well as the risks and benefits of the medication including possible death.  The updated [place] Board of [employer] prescription monitoring program was reviewed, and the patient has been found to be compliant with current treatment plan. Medication management provided by Dr. Valdez.   New Referral to Ortho-Dr Damián Malone  Referral to NSGY for another provider.   Discussed pain pump performed by Dr Ward- patient has contact information, we can send referral if appropriate  Patient Defers SCS Trial at this time  Increased Lyrica to 150 mg BID. Last Cr 0.96 and eGFR 62 from 4/24/24   Discussed with patient and Cris Thompson, that I am unable to change patient's medications.  Dr Valdez is unavailable to discuss medication management or changing the medications.  Reached out to Dr Rivers's office to see if patient could be seen as soon as possible. Patient is aware that another physician is not required to start medications with patient and that they would evaluate her for appropriateness for medication management.  RTC to follow-up with MD Dr. Cullen and Manager, Cris, were consulted on the patient and agrees with this plan.      Ayse Moreno NP   07/01/2024      I spent a total of 55 minutes on the day of the visit.  This includes face to face time and non-face to face time preparing to see the patient (eg, review of tests), obtaining and/or reviewing separately obtained history, documenting clinical information in the electronic or other health record, independently interpreting results and communicating results to the patient/family/caregiver, or care coordinator.

## 2024-07-02 ENCOUNTER — TELEPHONE (OUTPATIENT)
Dept: ORTHOPEDICS | Facility: CLINIC | Age: 74
End: 2024-07-02
Payer: MEDICARE

## 2024-07-02 DIAGNOSIS — M51.36 DDD (DEGENERATIVE DISC DISEASE), LUMBAR: Primary | ICD-10-CM

## 2024-07-02 NOTE — TELEPHONE ENCOUNTER
Called and spoke to pt regarding referral. Pt advised she did not want to be seen for her hip and her pain was coming from her back. She also advised her apt with Dr Rodrigez is scheduled and will proceed to see him for her back pain.

## 2024-07-03 ENCOUNTER — TELEPHONE (OUTPATIENT)
Dept: SPINE | Facility: CLINIC | Age: 74
End: 2024-07-03
Payer: MEDICARE

## 2024-07-03 DIAGNOSIS — M47.26 OSTEOARTHRITIS OF SPINE WITH RADICULOPATHY, LUMBAR REGION: ICD-10-CM

## 2024-07-03 DIAGNOSIS — M25.551 RIGHT HIP PAIN: ICD-10-CM

## 2024-07-03 DIAGNOSIS — M54.17 LUMBOSACRAL RADICULOPATHY: ICD-10-CM

## 2024-07-03 DIAGNOSIS — M71.38 CYST OF LUMBAR FACET JOINT: ICD-10-CM

## 2024-07-03 RX ORDER — OXYCODONE AND ACETAMINOPHEN 10; 325 MG/1; MG/1
1 TABLET ORAL EVERY 12 HOURS PRN
Qty: 60 TABLET | Refills: 0 | Status: SHIPPED | OUTPATIENT
Start: 2024-07-03 | End: 2024-08-04

## 2024-07-03 NOTE — TELEPHONE ENCOUNTER
Patient requesting refill on Percocet  Last office visit 7/1/24   shows last refill on 5/21/24  Patient does have a pain contract on file with [facility] department  Patient last UDS 11/21/2023 was consistent with current therapy       CODEINE  Not Detected   Comment: INTERPRETIVE INFORMATION: Codeine, U  Positive Cutoff: 40 ng/mL  Methodology: Mass Spectrometry   MORPHINE  Not Detected   Comment: INTERPRETIVE INFORMATION:Morphine, U  Positive Cutoff: 20 ng/mL  Methodology: Mass Spectrometry   6-ACETYLMORPHINE  Not Detected   Comment: INTERPRETIVE INFORMATION:6-acetylmorphine, U  Positive Cutoff: 20 ng/mL  Methodology: Mass Spectrometry   OXYCODONE  Present   Comment: INTERPRETIVE INFORMATION:Oxycodone, U  Positive Cutoff: 40 ng/mL  Methodology: Mass Spectrometry   NOROYXCODONE  Present   Comment: INTERPRETIVE INFORMATION:Noroxycodone, U  Positive Cutoff: 100 ng/mL  Methodology: Mass Spectrometry   OXYMORPHONE  Present   Comment: INTERPRETIVE INFORMATION:Oxymorphone, U  Positive Cutoff: 40 ng/mL  Methodology: Mass Spectrometry   NOROXYMORPHONE  Present   Comment: INTERPRETIVE INFORMATION:Noroxymorphone, U  Positive Cutoff: 100 ng/mL  Methodology: Mass Spectrometry   HYDROCODONE  Not Detected   Comment: INTERPRETIVE INFORMATION:Hydrocodone, U  Positive Cutoff: 40 ng/mL  Methodology: Mass Spectrometry   NORHYDROCODONE  Not Detected   Comment: INTERPRETIVE INFORMATION:Norhydrocodone, U  Positive Cutoff: 100 ng/mL  Methodology: Mass Spectrometry   HYDROMORPHONE  Not Detected   Comment: INTERPRETIVE INFORMATION:Hydromorphone, U  Positive Cutoff: 20 ng/mL  Methodology: Mass Spectrometry   BUPRENORPHINE  Not Detected   Comment: INTERPRETIVE INFORMATION:Buprenorphine, U  Positive Cutoff: 5 ng/mL  Methodology: Mass Spectrometry   NORUBPRENORPHINE  Not Detected   Comment: INTERPRETIVE INFORMATION:Norbuprenorphine, U  Positive Cutoff: 20 ng/mL  Methodology: Mass Spectrometry   FENTANYL  Not Detected    Comment: INTERPRETIVE INFORMATION:Fentanyl, U  Positive Cutoff: 2 ng/mL  Methodology: Mass Spectrometry   NORFENTANYL  Not Detected   Comment: INTERPRETIVE INFORMATION:Norfentanyl, U  Positive Cutoff: 2 ng/mL  Methodology: Mass Spectrometry   MEPERIDINE METABOLITE  Not Detected   Comment: INTERPRETIVE INFORMATION:Meperidine metabolite, U  Positive Cutoff: 50 ng/mL  Methodology: Mass Spectrometry   TAPENTADOL  Not Detected   Comment: INTERPRETIVE INFORMATION:Tapentadol, U  Positive Cutoff: 100 ng/mL  Methodology: Mass Spectrometry   TAPENTADOL-O-SULF  Not Detected   Comment: INTERPRETIVE INFORMATION:Tapentadol-o-Sulf, U  Positive Cutoff: 200 ng/mL  Methodology: Mass Spectrometry   METHADONE  Negative   Comment: Presumptive negative by immunoassay. Testing by mass  spectrometry is available on request.  INTERPRETIVE INFORMATION: Methadone Screen, U  Positive Cutoff: 150 ng/mL  Methodology: Immunoassay   TRAMADOL  Negative   Comment: Presumptive negative by immunoassay. Testing by mass  spectrometry is available on request.  INTERPRETIVE INFORMATION:Tramadol Screen, U  Positive Cutoff: 100 ng/mL  Methodology: Immunoassay   AMPHETAMINE  Not Detected   Comment: INTERPRETIVE INFORMATION:Amphetamine, U  Positive Cutoff: 50 ng/mL  Methodology: Mass Spectrometry   METHAMPHETAMINE  Not Detected   Comment: INTERPRETIVE INFORMATION:Methamphetamine, U  Positive Cutoff: 200 ng/mL  Methodology: Mass Spectrometry   MDMA- ECSTASY  Not Detected   Comment: INTERPRETIVE INFORMATION:MDMA, U  Positive Cutoff: 200 ng/mL  Methodology: Mass Spectrometry   MDA  Not Detected   Comment: INTERPRETIVE INFORMATION:MDA, U  Positive Cutoff: 200 ng/mL  Methodology: Mass Spectrometry   MDEA- [place]  Not Detected   Comment: INTERPRETIVE INFORMATION:MDEA, U  Positive Cutoff: 200 ng/mL  Methodology: Mass Spectrometry   METHYLPHENIDATE  Not Detected   Comment: INTERPRETIVE INFORMATION:Methylphenidate, U  Positive Cutoff: 100 ng/mL  Methodology: Mass  Spectrometry   PHENTERMINE  Not Detected   Comment: INTERPRETIVE INFORMATION:Phentermine, U  Positive Cutoff: 100 ng/mL  Methodology: Mass Spectrometry   BENZOYLECGONINE  Negative   Comment: Presumptive negative by immunoassay. Testing by mass  spectrometry is available on request.  INTERPRETIVE INFORMATION:Cocaine Screen, U  Positive Cutoff: 150 ng/mL  Methodology: Immunoassay   ALPRAZOLAM  Not Detected   Comment: INTERPRETIVE INFORMATION:Alprazolam, U  Positive Cutoff: 40 ng/mL  Methodology: Mass Spectrometry   ALPHA-OH-ALPRAZOLAM  Not Detected   Comment: INTERPRETIVE INFORMATION:Alpha-OH-Alprazolam, U  Positive Cutoff: 20 ng/mL  Methodology: Mass Spectrometry   CLONAZEPAM  Not Detected   Comment: INTERPRETIVE INFORMATION:Clonazepam, U  Positive Cutoff: 20 ng/mL  Methodology: Mass Spectrometry   7-AMINOCLONAZEPAM  Not Detected   Comment: INTERPRETIVE INFORMATION:7-Aminoclonazepam, U  Positive Cutoff: 40 ng/mL  Methodology: Mass Spectrometry   DIAZEPAM  Not Detected   Comment: INTERPRETIVE INFORMATION:Diazepam, U  Positive Cutoff: 50 ng/mL  Methodology: Mass Spectrometry   NORDIAZEPAM  Not Detected   Comment: INTERPRETIVE INFORMATION:Nordiazepam, U  Positive Cutoff: 50 ng/mL  Methodology: Mass Spectrometry   OXAZEPAM  Not Detected   Comment: INTERPRETIVE INFORMATION:Oxazepam, U  Positive Cutoff: 50 ng/mL  Methodology: Mass Spectrometry   TEMAZEPAM  Not Detected   Comment: INTERPRETIVE INFORMATION:Temazepam, U  Positive Cutoff: 50 ng/mL  Methodology: Mass Spectrometry   Lorazepam  Not Detected   Comment: INTERPRETIVE INFORMATION:Lorazepam, U  Positive Cutoff: 60 ng/mL  Methodology: Mass Spectrometry   MIDAZOLAM  Not Detected   Comment: INTERPRETIVE INFORMATION:Midazolam, U  Positive Cutoff: 20 ng/mL  Methodology: Mass Spectrometry   ZOLPIDEM  Not Detected   Comment: INTERPRETIVE INFORMATION:Zolpidem, U  Positive Cutoff: 20 ng/mL  Methodology: Mass Spectrometry   BARBITURATES  Negative   Comment: Presumptive negative  by immunoassay. Testing by mass  spectrometry is available on request.  INTERPRETIVE INFORMATION:Barbiturates Screen, U  Positive Cutoff: 200 ng/mL  Methodology: Immunoassay   Creatinine, Urine 20.0 - 400.0 mg/dL 138.5   ETHYL GLUCURONIDE  Negative   Comment: Presumptive negative by immunoassay. Testing by mass  spectrometry is available on request.  INTERPRETIVE INFORMATION:Ethyl Glucuronide Screen, U  Positive Cutoff: 500 ng/mL  Methodology: Immunoassay   MARIJUANA METABOLITE  Negative   Comment: Presumptive negative by immunoassay. Testing by mass  spectrometry is available on request.  INTERPRETIVE INFORMATION: THC (Cannabinoids) Screen, U  Positive Cutoff: 50 ng/mL  Methodology: Immunoassay   PCP  Negative   Comment: Presumptive negative by immunoassay. Testing by mass  spectrometry is available on request.  INTERPRETIVE INFORMATION:Phencyclidine Screen, U  Positive Cutoff: 25 ng/mL  Methodology: Immunoassay   CARISOPRODOL  Negative   Comment: Presumptive negative by immunoassay. Testing by mass  spectrometry is available on request.  INTERPRETIVE INFORMATION: Carisoprodol Screen, U  Positive Cutoff: 100 ng/mL  Methodology: Immunoassay  The carisoprodol immunoassay has cross-reactivity to  carisoprodol and meprobamate.   Naloxone  Not Detected   Comment: INTERPRETIVE INFORMATION:Naloxone, U  Positive Cutoff: 100 ng/mL  Methodology: Mass Spectrometry   Gabapentin  Present   Comment: INTERPRETIVE INFORMATION:Gabapentin, U  Positive Cutoff: 3,000 ng/mL  Methodology: Mass Spectrometry   Pregabalin  Not Detected   Comment: INTERPRETIVE INFORMATION:Pregabalin, U  Positive Cutoff: 3,000 ng/mL  Methodology: Mass Spectrometry   Alpha-OH-Midazolam  Not Detected   Comment: INTERPRETIVE INFORMATION:Alpha-OH-Midazolam, U  Positive Cutoff: 20 ng/mL  Methodology: Mass Spectrometry   Zolpidem Metabolite  Not Detected   Comment: INTERPRETIVE INFORMATION:Zolpidem Metabolite, U  Positive Cutoff: 100 ng/mL  Methodology: Mass  Spectrometry

## 2024-07-03 NOTE — TELEPHONE ENCOUNTER
----- Message from Marlys Solis sent at 7/3/2024  4:12 PM CDT -----  Regarding: appt request  Name of Who is Calling: Chantal           What is the request in detail: Patient is requesting a call back to schedule an appointment.            Can the clinic reply by [facility]: No           [facility]  [phone]

## 2024-07-03 NOTE — TELEPHONE ENCOUNTER
----- Message from Belinda Jaramillo sent at 7/3/2024  3:07 PM CDT -----  Regarding: oxyCODONE-acetaminophen (PERCOCET) 5-325 mg per tablet  Type:  RX Refill Request    Who Called: patient    Refill or New Rx:refill    RX Name and Strength:oxyCODONE-acetaminophen (PERCOCET)  mg per tablet    How is the patient currently taking it? (ex. 1XDay):    Is this a 30 day or 90 day RX:    Preferred Pharmacy with phone number:[facility]   Phone: [phone]  Fax: [phone]          Local or Mail Order:local    Ordering Provider:    Would the patient rather a call back or a response via [facility]? call    Best Call Back Number:[phone]    Additional Information: medication due on 07/10

## 2024-07-09 ENCOUNTER — TELEPHONE (OUTPATIENT)
Dept: PODIATRY | Facility: CLINIC | Age: 74
End: 2024-07-09
Payer: MEDICARE

## 2024-07-11 ENCOUNTER — OFFICE VISIT (OUTPATIENT)
Dept: PAIN MEDICINE | Facility: CLINIC | Age: 74
End: 2024-07-11
Payer: MEDICARE

## 2024-07-11 VITALS
SYSTOLIC BLOOD PRESSURE: 155 MMHG | WEIGHT: 142.44 LBS | BODY MASS INDEX: 26.91 KG/M2 | RESPIRATION RATE: 18 BRPM | DIASTOLIC BLOOD PRESSURE: 85 MMHG | HEART RATE: 64 BPM

## 2024-07-11 DIAGNOSIS — M51.36 DDD (DEGENERATIVE DISC DISEASE), LUMBAR: ICD-10-CM

## 2024-07-11 DIAGNOSIS — G89.4 CHRONIC PAIN SYNDROME: ICD-10-CM

## 2024-07-11 DIAGNOSIS — M48.061 SPINAL STENOSIS OF LUMBAR REGION WITHOUT NEUROGENIC CLAUDICATION: Primary | ICD-10-CM

## 2024-07-11 DIAGNOSIS — M17.11 PRIMARY OSTEOARTHRITIS OF RIGHT KNEE: ICD-10-CM

## 2024-07-11 DIAGNOSIS — M47.26 OSTEOARTHRITIS OF SPINE WITH RADICULOPATHY, LUMBAR REGION: ICD-10-CM

## 2024-07-11 PROCEDURE — 99999 PR PBB SHADOW E&M-EST. PATIENT-LVL IV: CPT | Mod: PBBFAC,,,

## 2024-07-11 RX ORDER — TRIAMCINOLONE ACETONIDE 40 MG/ML
20 INJECTION, SUSPENSION INTRA-ARTICULAR; INTRAMUSCULAR
Status: COMPLETED | OUTPATIENT
Start: 2024-07-11 | End: 2024-07-11

## 2024-07-11 RX ORDER — MORPHINE SULFATE 30 MG/1
30 TABLET, FILM COATED, EXTENDED RELEASE ORAL EVERY 12 HOURS PRN
Qty: 60 TABLET | Refills: 0 | Status: SHIPPED | OUTPATIENT
Start: 2024-07-11

## 2024-07-11 RX ORDER — BUPIVACAINE HYDROCHLORIDE 2.5 MG/ML
3.5 INJECTION, SOLUTION EPIDURAL; INFILTRATION; INTRACAUDAL
Status: COMPLETED | OUTPATIENT
Start: 2024-07-11 | End: 2024-07-11

## 2024-07-11 RX ADMIN — TRIAMCINOLONE ACETONIDE 20 MG: 40 INJECTION, SUSPENSION INTRA-ARTICULAR; INTRAMUSCULAR at 04:07

## 2024-07-11 RX ADMIN — BUPIVACAINE HYDROCHLORIDE 8.75 MG: 2.5 INJECTION, SOLUTION EPIDURAL; INFILTRATION; INTRACAUDAL at 04:07

## 2024-07-11 NOTE — PROGRESS NOTES
Chronic patient Established Note (Follow up visit)      SUBJECTIVE:  Interval History 7/11/2024:  Chantal Ridley reutrns to clinic for follow-up of chronic pain and right knee pain. She states her right knee has been giving her problems with walking and standing. The pain is aching in nature. She would like a steroid injection today.  She would also like to discuss switching her medications with Dr Valdez. She is also interested in a referral to Dr Ward's office to be evaluated for a pain pump.  The patient denies fever/night sweats, urinary incontinence, bowel incontinence, significant weight changes, significant motor weakness or changes, or loss of sensations. Her pain today is 10/10    Interval History 7/1/2024:  Chantal Ridley returns to clinic for follow-up of chronic pain. She is upset today because her appointment with Dr Valdez to discuss pain medication management was rescheduled and she is now seeing the NP.  She reports that the Dilaudid that was previously prescribed, 4 mg TID PRN is not helping and she has not been taking it.  She states she lives in constant pain and can barely walk across her house to make herself food or a cup of coffee. She does not want to proceed with previously discussed SCS trial. She has researched a pain pump through [employer] and wishes to proceed with this procedure.  She would like another referral to Ortho to discuss her hip pain as Dr Faye is no longer performing surgeries.  She would also like a referral to see another NSGY provider although she is not interested in surgery at this time.  She would also like to switch pain providers at [facility] to Dr Rivers at [place] as this is a more convenient location and she states her podiatrist originally wanted her to see Dr Rivers. She had some leftover Percocet and has taken this with some relief.  She would like to switch to Percocet. She also takes Lyrica with some relief and would like to  see if she can increase this dose.  She continues the Healthy Back Program with good relief. The patient denies fever/night sweats, urinary incontinence, bowel incontinence, significant weight changes, significant motor weakness or changes, or loss of sensations. Her pain today is 10/10.     Interval History 5/29/2024.   She returns for follow-up.  She said she meets understood and took both oxycodone and Dilaudid.  She does not think the oxycodone helps at all.  She thinks the Dilaudid helps a little.  She has not been using any over-the-counter medications.  She continues on pregabalin.  She has severe pain that is interfering with her activities of daily living to a great extent.  Says she has difficulties with cooking and hygiene among other things.  Denied having any new bowel or bladder symptomatology.  Pain is mostly around the right hip and right thigh.  She does have lower back pain as well as some radicular symptoms down the right lower extremities with tingling in the great toe.      Interval History 4/9/2024:  The patient is here for follow up of back pain. The pain continues to radiate down the back of the right leg. She had no benefit with HERACLIO in the past. Since previous encounter, she did f/u with Dr. Rodrigez and she did not wish to pursue surgical options at this time. He referred her to Ortho to have her right hip checked. She saw Dr. Faye who does not think that her hip is the issue. He believes that her primary cause is her back, which I agree with. She was given a Toradol shot last month by podiatry which she says did not help her at all for any area of pain. She is starting Healthy Back on 4/18/24. She is taking Percocet 10/325 mg BID which she says does not help very much. She is asking about ITP opioid pain medications. Her pain today is 10/10.    Interval History 2/19/2024:  The patient is here for follow up of right sided back and leg pain. She is s/p right L3/4 and L4/5 TF HERACLIO with no relief,  not even short term. She actually feels like it worsened her pain. Leg pain is greater than back pain. She continues to have right sided back pain with radiation down the front of the right leg. She has associated numbness and weakness. She does have some symptoms on the left, but the right side is the worse. She did see Dr. Rodrigez last year who discussed surgery if epidural did not help. [place] She continues to take Percocet BID with mild benefit. She stopped Gabapentin due to minimal benefit. She is requesting Naproxen as she feels like it was helpful in the past. Her pain today is 10/10.    Interval History 1/16/2024:  Chantal Ridley presents to the clinic for a follow-up appointment for hip pain. She is s/p right hip joint injection on 12/18/2023. She reports relief for 2 weeks. Then, her pain returned to baseline. She continues to report low back pain that radiates into the anterolateral aspect of her right thigh to her knee. She denies any left leg pain. She does have right hip pain. Her pain is worse with standing and walking. She also reports pain with moving from sitting to standing. She is currently taking Percocet as needed with benefit. She denies any adverse effects. She denies any other health changes. Her pain today is 10/10.    HPI  A former patient of Dr. Garland who was on Percocet for chronic pain.  She was given 7.5 to use twice a day.  They had recommended interventions but she declined.  She is interested in interventions if they would help.  She has chronic right lower back pain that radiates to the right groin and thigh.  It also extends this down to the foot anterior and dorsally.  There is sometimes tingling but no numbness.  She does not appreciate weakness.  She is stiff when she gets up and takes her a while to get moving.  There is no imaging of her hip but she thinks she had 1 at [facility].  She had therapy in the remote  past and has not been following with the exercises.  She is interested in getting back into therapy.    Pain Disability Index Review:      7/11/2024     2:56 PM 5/27/2024     1:34 PM 5/1/2024     1:57 PM   Last 3 PDI Scores   Pain Disability Index (PDI) 70 60 70       Pain Medications:  Percocet-not helpful  Hydromorphone- not helpful  MS Contin    Opioid Contract: yes     report:  Reviewed and consistent with medication use as prescribed.    Pain Procedures:   12/18/2023- Right hip joint injection  2/5/24 Right L3/4 and L4/5 TF HERACLIO- no relief  7/11/2024 - Right Knee CSI ([place])    Physical Therapy/Home Exercise: yes    Imaging:   Xray Hips 11/21/2023:  CLINICAL HISTORY:  Pain in right hip     FINDINGS:  Two views right hip: There is severe DJD and impingement change.  No fracture dislocation bone destruction seen.    MRI LUMBAR SPINE WITHOUT CONTRAST     FINDINGS:  Alignment: Leftward curvature of the lumbar spine.  Reversal of curvature thoracolumbar level     Vertebrae: No marrow replacing infiltrative process.  No fracture.     Discs: Disc space narrowing T12-L1 L1-L2 L2-L3 with discogenic degenerative changes at the endplates.     Cord: Normal contour and signal.  Conus terminates at L1-L2     Degenerative findings:     T12-L1: Circumferential disc bulge, ligamentum flavum buckling, and bilateral facet arthropathy.  No spinal canal stenosis or neural foraminal narrowing.     L1-L2: Circumferential disc bulge, ligamentum flavum buckling, and bilateral facet arthropathy.  Findings result in mild spinal canal stenosis.  Partial effacement of the inferior perineural fat adjacent to the bilateral exiting nerve roots, consistent with mild bilateral neural foraminal narrowing.     L2-L3: Circumferential disc bulge, ligamentum flavum buckling, and bilateral facet arthropathy.  Findings result in moderate spinal canal stenosis.  Complete effacement of the right perineural fat consistent with severe right neural  foraminal narrowing.  Partial effacement of the anterior fat adjacent to the left exiting nerve root, consistent with mild left neural foraminal narrowing.     L3-L4: Circumferential disc bulge, ligamentum flavum buckling, and bilateral facet arthropathy.  Posterior projecting synovial cyst arising from the right facet.  Severe spinal canal stenosis.  Near complete effacement of the bilateral perineural fat, consistent with moderate bilateral neural foraminal narrowing.     L4-L5: Circumferential disc bulge, ligamentum flavum buckling, and bilateral facet arthropathy.  Moderate-severe spinal canal stenosis.     L5-S1: Circumferential disc bulge, ligamentum flavum buckling, and bilateral facet arthropathy.  No spinal canal stenosis.  Near complete effacement of the left perineural fat, consistent with moderate left neural foraminal narrowing.  Partial effacement of the perineural fat along the inferior aspect of the exiting right nerve root, consistent with mild right neural foraminal narrowing.     Paraspinal muscles & soft tissues: Unremarkable.     Impression:     1. Degenerative changes of the lumbar spine most pronounced at L3-L4 with severe spinal canal stenosis and moderate bilateral neural foraminal narrowing.  2. Additional findings, as above.     Electronically signed by resident: Franchesca Pierre  Date:                                            06/20/2024  Time:                                           16:38     Electronically signed by:Aj Benitez MD  Date:                                            06/20/2024  Time:                                           16:54    Allergies:   Review of patient's allergies indicates:   Allergen Reactions    Ace inhibitors Anaphylaxis, Itching and Swelling    Amlodipine Swelling    Amoxicillin Anaphylaxis    Erythromycin Swelling and Hives     Tongue swelling    Penicillins Anaphylaxis    Losartan Hives     Headaches. Patient is currently taking Losartan for high b/p  and states they are feeling fine.    Tramadol Itching    Olmesartan Rash     Other reaction(s): Skin Rashes / Eruption of skin, Benicar       Current Medications:   Current Outpatient Medications   Medication Sig Dispense Refill    diclofenac sodium (SOLARAZE) 3 % gel Apply topically 2 (two) times daily.      estrogen, conjugated,-medroxyprogesterone 0.625-2.5mg (PREMPRO) 0.625-2.5 mg per tablet Take 1 tablet by mouth once daily.      estrogen, conjugated,-medroxyprogesterone 0.625-2.5mg (PREMPRO) 0.625-2.5 mg per tablet Take 1 tablet by mouth once daily. 30 tablet 12    hydrOXYzine pamoate (VISTARIL) 25 MG Cap Take 25 mg by mouth daily as needed.      irbesartan (AVAPRO) 300 MG tablet Take 300 mg by mouth.      LIDOcaine (LIDODERM) 5 % Place 1 patch onto the skin once daily. Remove & Discard patch within 12 hours or as directed by MD 30 patch 6    LIDOcaine-prilocaine (EMLA) cream Apply topically as needed.      pregabalin (LYRICA) 150 MG capsule Take 1 capsule (150 mg total) by mouth 2 (two) times daily. 60 capsule 0    pregabalin (LYRICA) 75 MG capsule Take 1 capsule (75 mg total) by mouth 2 (two) times daily. 60 capsule 1    sitagliptan-metformin (JANUMET)  mg per tablet Take 1 tablet by mouth 2 (two) times daily with meals.      tiZANidine (ZANAFLEX) 4 MG tablet Take 4 mg by mouth every 8 (eight) hours.      amitriptyline (ELAVIL) 10 MG tablet Take 1 tablet (10 mg total) by mouth nightly as needed for Insomnia. 30 tablet 2    diazePAM (VALIUM) 5 MG tablet Take 1 tablet (5 mg total) by mouth once. Take medication 30 minutes prior to procedure  for 1 dose 1 tablet 0    fluconazole (DIFLUCAN) 150 MG Tab Take 1 tablet by mouth Now, And repeat in 48 hours. (Patient not taking: Reported on 7/11/2024)      irbesartan (AVAPRO) 150 MG tablet Take 150 mg by mouth every evening. (Patient not taking: Reported on 7/11/2024)      morphine (MS CONTIN) 30 MG 12 hr tablet Take 1 tablet (30 mg total) by mouth every 12  (twelve) hours as needed for Pain (for severe pain). 60 tablet 0    phenyleph-min oil-petrolatum 0.25-14-74.9 % Oint Place 1 Application rectally. (Patient not taking: Reported on 7/11/2024)      phenylephrine-cocoa butter 0.25-88.44 % Supp suppository Place 1 suppository rectally. (Patient not taking: Reported on 7/11/2024)       Current Facility-Administered Medications   Medication Dose Route Frequency Provider Last Rate Last Admin    BUPivacaine (PF) 0.25% (2.5 mg/ml) injection 8.75 mg  3.5 mL Intra-articular 1 time in Clinic/HOD         triamcinolone acetonide injection 20 mg  20 mg Intra-articular 1 time in Clinic/HOD          Facility-Administered Medications Ordered in Other Visits   Medication Dose Route Frequency Provider Last Rate Last Admin    0.9%  NaCl infusion   Intravenous Continuous Chris Farmer MD           REVIEW OF SYSTEMS:    GENERAL:  No weight loss, malaise or fevers.  HEENT:  Negative for frequent or significant headaches.  NECK:  Negative for lumps, goiter, pain and significant neck swelling.  RESPIRATORY:  Negative for cough, wheezing or shortness of breath.  CARDIOVASCULAR:  Negative for chest pain, leg swelling or palpitations. HTN  GI:  Negative for abdominal discomfort, blood in stools or black stools or change in bowel habits.  MUSCULOSKELETAL:  See HPI.  SKIN:  Negative for lesions, rash, and itching.  PSYCH:  Negative for sleep disturbance, mood disorder and recent psychosocial stressors.  ENDO: Diabetes  HEMATOLOGY/LYMPHOLOGY:  Negative for prolonged bleeding, bruising easily or swollen nodes.  NEURO:   No history of headaches, syncope, paralysis, seizures or tremors.  All other reviewed and negative other than HPI.    Past Medical History:  Past Medical History:   Diagnosis Date    Diabetes mellitus     Fibromyalgia     Hx of degenerative disc disease     neck & back    Hypertension        Past Surgical History:  Past Surgical History:   Procedure Laterality Date    CATARACT  EXTRACTION W/  INTRAOCULAR LENS IMPLANT  2016    EPIDURAL STEROID INJECTION N/A 2024    Procedure: CAUDAL HERACLIO WITH CATH;  Surgeon: Ike Valdez MD;  Location: [facility] PAIN [facility]GT;  Service: Pain Management;  Laterality: N/A;  [phone]  2 WK F/U IMCHAEL    INJECTION OF JOINT Right 2023    Procedure: INJECTION, JOINT RIGHT HIP;  Surgeon: Ike Valdez MD;  Location: [facility] PAIN [facility];  Service: Pain Management;  Laterality: Right;  [phone]    TRANSFORAMINAL EPIDURAL INJECTION OF STEROID Right 2024    Procedure: LUMBAR TRANSFORAMINAL RIGHT L3/4 AND L4/5;  Surgeon: Ike Valdez MD;  Location: [facility] PAIN [facility]GT;  Service: Pain Management;  Laterality: Right;  [phone]  2 WK F/U SIGIFREDO    TUBAL LIGATION         Family History:  No family history on file.    Social History:  Social History     Socioeconomic History    Marital status: Single   Tobacco Use    Smoking status: Former     Current packs/day: 0.00     Types: Cigarettes     Quit date:      Years since quittin.5    Smokeless tobacco: Never   Substance and Sexual Activity    Alcohol use: No    Drug use: No     Social Determinants of Health     Financial Resource Strain: Unknown (2023)    [place] [facility]    Overall Financial Resource Strain (CARDIA)     Difficulty of Paying Living Expenses: Patient declined   Food Insecurity: Unknown (2023)    [place] [facility]    Hunger Vital Sign     Worried About Running Out of Food in the Last Year: Patient declined     Ran Out of Food in the Last Year: Patient declined   Transportation Needs: Unmet Transportation Needs (2023)    [place] [employer] [facility] [employer] [facility]    PRAPARE - Transportation     Lack of Transportation (Medical): No     Lack of Transportation (Non-Medical): Yes   Stress: No Stress Concern Present (2023)    [place] [facility]    [employer] - Occupational Stress Questionnaire      Feeling of Stress : Not at all   Housing Stability: Unknown (2023)    [facility]    Housing Stability Vital Sign     Unable to Pay for Housing in the Last Year: No     In the last 12 months, was there a time when you did not have a steady place to sleep or slept in a shelter (including now)?: No       OBJECTIVE:    BP (!) 155/85   Pulse 64   Resp 18   Wt 64.6 kg (142 lb 6.7 oz)   BMI 26.91 kg/m²     PHYSICAL EXAMINATION:    General appearance: Well appearing, in no acute distress, alert and oriented x3.  Psych:  Mood and affect appropriate.  Skin: Skin color, texture, turgor normal, no rashes or lesions, in both upper and lower body.  Head/face:  Atraumatic, normocephalic. No palpable lymph nodes  Back: Straight leg raising in the sitting position is negative to radicular pain bilaterally. Limited ROM with pain on extension and flexion. Positive facet loading bilaterally.  Extremities: Right knee: limited ROM with pain on extension and flexion. TTP over medial and lateral joint line. No deformities, edema, or skin discoloration. Good capillary refill.  Musculoskeletal: 4/5 strength in right ankle with plantar and dorsiflexion. 5/5 strength in left ankle with plantar and dorsiflexion. 4/5 strength with right knee flexion and extension. 5/5 strength with left knee flexion and extension. 5/5 strength in right EHL, 5/5 strength in left EHL.  Neuro: Decreased sensation to RLE.  Gait: Normal    ASSESSMENT: 74 y.o. year old female with low back and hip pain, consistent with the followin. Spinal stenosis of lumbar region without neurogenic claudication  Ambulatory referral/consult to Pain Clinic      2. Osteoarthritis of spine with radiculopathy, lumbar region  Ambulatory referral/consult to Pain Clinic      3. DDD (degenerative disc disease), lumbar  Ambulatory referral/consult to Pain Clinic      4. Primary osteoarthritis of right knee  BUPivacaine (PF) 0.25% (2.5 mg/ml)  injection 8.75 mg    triamcinolone acetonide injection 20 mg          PLAN:  We discussed with the patient the assessment and recommendations. The following is the plan we agreed on:  Dr Valdez evaluated the patient today in clinic.   He will switch the patient to MS Contin 30 mg BID PRN severe pain, #60, R 0  Patient advised to properly dispose of all previously prescribed narcotics-she can bring to pharmacy to turn in  The patient is here today for a refill of current pain medications and they believe these provide effective pain control and improvements in quality of life.  The patient notes no serious side effects, and feels the benefits outweigh the risks.  The patient was reminded of the pain contract that they signed previously as well as the risks and benefits of the medication including possible death.  The updated [place] Board  Pharmacy prescription monitoring program was reviewed, and the patient has been found to be compliant with current treatment plan. Medication management provided by Dr. Valdez.   UDS 11/21/2023.   Referral to Dr Ward re: pain pump evaluation  Medical Release Form filled out today to send records to Dr Ward  Patient Defers SCS Trial at this time  Continue Lyrica to 150 mg BID. Last Cr 0.96 and eGFR 62 from 4/24/24   Right knee CSI performed in-office by Dr Valdez. See Procedure note below:  RTC 1 month to evaluate CSI    Ayse Moreno NP   07/11/2024    PROCEDURE:    Right Knee  Injection Ultrasound guidance  Time-out taken to identify patient and procedure side prior to starting the procedure.   I attest that I have reviewed the patient's home medications prior to the procedure and no contraindication have been identified. I  re-evaluated the patient after the patient was positioned for the procedure in the procedure room immediately before the procedural time-out. The vital signs are current and represent the current state of the patient which has not significantly changed  since the preprocedure assessment.                   Date of Service: 07/11/2024    PCP: [facility] -     Referring Physician: n/a                                                                                                                                   PROCEDURE:  right knee injection                                                                                   REASON FOR PROCEDURE:right  knee * No surgery found *  Primary Osteoarthritis of right knee    POSTOP DIAGNOSIS: right  knee * No surgery found *     Primary Osteoarthritis of right knee    PHYSICIAN: Ike Valdez MD  ASSISTANTS: Ayse Moreno NP                                                                                LOCAL ANESTHESIA:  None                                                                                              MEDICATION INJECTED:   3.5 mL Bupivacaine 0.25% + 0.5 mL Kenalog 40mg/1mL  SEDATION MEDICATIONS: None                                                                               COMPLICATIONS:  None.                                                                                                                                     ESTIMATED BLOOD LOSS:  None.                                                                                                                              TECHNIQUE:  With the patient laying supine and the knee semi-flexed on a    wedge, the appropriate knee was prepped and draped in the usual sterile fashion    using ChloraPrep and a fenestrated drape.  Knee joint line determined by landmark palpation.  The 1.5in 22-gauge needle was introduced into the joint space. Medication was then injected. The patient tolerated the procedure well.                                                                          The patient was given post procedure discharge instructions and follow-up  instructions.   The patient was discharged in a stable condition.    Ayse Moreno NP    07/11/2024

## 2024-07-16 ENCOUNTER — TELEPHONE (OUTPATIENT)
Dept: PODIATRY | Facility: CLINIC | Age: 74
End: 2024-07-16
Payer: MEDICARE

## 2024-07-16 ENCOUNTER — CLINICAL SUPPORT (OUTPATIENT)
Dept: REHABILITATION | Facility: HOSPITAL | Age: 74
End: 2024-07-16
Payer: MEDICARE

## 2024-07-16 ENCOUNTER — DOCUMENTATION ONLY (OUTPATIENT)
Dept: REHABILITATION | Facility: HOSPITAL | Age: 74
End: 2024-07-16

## 2024-07-16 ENCOUNTER — HOSPITAL ENCOUNTER (OUTPATIENT)
Dept: RADIOLOGY | Facility: HOSPITAL | Age: 74
Discharge: HOME | End: 2024-07-16
Attending: ORTHOPAEDIC SURGERY
Payer: MEDICARE

## 2024-07-16 DIAGNOSIS — M25.69 DECREASED ROM OF TRUNK AND BACK: Primary | ICD-10-CM

## 2024-07-16 DIAGNOSIS — M51.36 DDD (DEGENERATIVE DISC DISEASE), LUMBAR: ICD-10-CM

## 2024-07-16 PROCEDURE — 97112 NEUROMUSCULAR REEDUCATION: CPT | Mod: CQ

## 2024-07-16 PROCEDURE — 97110 THERAPEUTIC EXERCISES: CPT | Mod: CQ

## 2024-07-16 PROCEDURE — 72110 X-RAY EXAM L-2 SPINE 4/>VWS: CPT | Mod: TC

## 2024-07-16 PROCEDURE — 72110 X-RAY EXAM L-2 SPINE 4/>VWS: CPT | Mod: 26,,, | Performed by: RADIOLOGY

## 2024-07-16 NOTE — PROGRESS NOTES
"  [facility]      Name: Chantal Ridley  Clinic Number: [id number]    Therapy Diagnosis:   Encounter Diagnosis   Name Primary?    Decreased ROM of trunk and back Yes       Physician: Jernoimo Rodrigez MD    Visit Date: 2024  Physician Orders: PT Eval and Treat  Medical Diagnosis from Referral:   Diagnosis   M47.816 (ICD-10-CM) - Lumbar spondylosis      Evaluation Date: 2024  Authorization Period Expiration: 24  Plan of Care Expiration: 2024 sent 24-24  Reassessment Due:24  Visit # / Visits authorized:  (lumbar roll on MedX- used precor for this visit due to flare up and ease of getting on and off machine)  MedX testing visit 2     Time In: 2:00 PM   Time Out: 3:10  PM  Total Billable Time: 30min  on   INSURANCE and OUTCOMES: Fee for Service with FOTO Outcomes 2/3     Precautions: standard, HTN, and diabetes     Pattern of pain determined: 1PEN    Subjective   Chantal reports continued c/o moderate to severe pain to lower back, R hip and knee. States that she met with pain med MD last week who gave her an injection in her knee, prescribed new meds, ordered MRI and x-rays.  States that they are considering a pain pump. She also states that she has had to miss some appts recently due to transportation issues.    Patient reports tolerating previous visit: Min soreness  Patient reports their pain to be 8/10 on a 0-10 scale with 0 being no pain and 10 being the worst pain imaginable.  Pain Location: low back, right hip  Social History:  lives alone in shotgun home, has hand rails and 3 steps into house  Occupation: retired  Leisure: visit with friends   Pt goals: " decrease pain"     Objective     Baseline IM Testing Results:   Date of testin24  ROM 12-30 deg   Max Peak Torque 92    Min Peak Torque 69    Flex/Ext Ratio 1.3:1   % below normative data 23     Mid IM Testing Results:   Date of testin24  ROM  deg   Max Peak Torque " 117   Min Peak Torque 75   Flex/Ext Ratio 1.5:1   % below normative data 9 with 11% gain in strength       MOVEMENT LOSS - Lumbar    Norms ROM Loss Initial 6/25/24   Flexion Fingers touch toes, sacral angle >/= 70 deg, uniform spinal curvature, posterior weight shift  moderate loss and major loss Mod loss   Extension ASIS surpasses toes, spine of scapulae surpasses heels, uniform spinal curve major loss, inc pain LB/RLE Major loss   Side glide Right   moderate loss Mod loss   Side glide Left   moderate loss Mod loss   Rotation Right PT observes contralateral shoulder major loss Mod loss   Rotation Left PT observes contralateral shoulder major loss Mod loss      Right hip: pain with flex/abd/int rot/and ext rotation  Difficulty putting socks and shoes on  Unable to SLS without support    OUTCOMES SELECTION:   Intake Outcome Measure for FOTO Lumbar Survey     Therapist reviewed FOTO scores for Chantal Ridley on 4/18/2024.   FOTO documents entered into [employer] - see Media section.     Intake Score: 13% functional ability  visit 6 9%  Goal Score: 30% functional ability         Treatment    Chantal received the treatments listed below:      Chantal received neuromuscular education  to isolate and engage spinal stabilization musculature correctly for motor control and coordination to aid in function and posture for 10 minutes on the Medical Medx Machine.  Patient performed MedX dynamic exercise with emphasis on spinal muscular control using pacer throughout  active range of motion. Therapist assisted patient in achieving optimal exertion for neural reeducation and endurance training by using the  Nandini Exertion Rating scale, by instructing the patient to aim for mid range of exertion, performing 15-20 repetitions, slowly, correctly,and safely.          7/16/2024     4:22 PM   [facility] - Short   Visit Number 14   VAS Pain Rating 8   Time 10   Lumbar Stretches - Slouch 10   Flexion in Lying 10   Lumbar Weight 45  lbs   Repetitions 20   Rating of Perceived Exertion 3      therapeutic exercises to develop strength, endurance, ROM, flexibility, posture, and core stabilization for 45 minutes including:    LTR 10x  BKTC c/ ball  Long axis distraction R LE  Bridging x 10 (minimal lift off) c/ RTB   SOC x 10    Not performed today  PPT 10x--NP  TrA activation with T-ball x 10- NP  BKFO c/ RTB 10x--NP  HSS 2x30 --NP  Piriformis stretch 2x 30--NP  Standing abd /ext 10x--NP  LAQ 2# 20x R--NP  Seated hip flex 2# 10 reps--NP  Seated knee extension 20x 2# R--NP      Peripheral muscle strengthening which included 1 set of 15-20 repetitions at a slow, controlled 10-13 second per rep pace focused on strengthening supporting musculature for improved body mechanics and functional mobility.  Pt and therapist focused on proper form during treatment to ensure optimal strengthening of each targeted muscle group.  She was able to complete full circuit of peripheral machines today with increased time.    cold pack for 10 minutes to LB/ R thigh and knee    Home Exercises Provided and Patient Education Provided   Home exercises include:LTR, bridge, laq, ball squeeze, walking  Cardio program:5/16/24 and encouraged walking in home 5/30/24  Lifting education date:TBD [employer] Discharge handout (date given):  Equipment at home/gym membership: no    Education provided:   -  cues w/exs  - MedX performance  - Precor ex performance  - HB Protocol  -walking more in home, moving    Written Home Exercises Provided: Patient instructed to cont prior HEP.  Exercises were reviewed and Chantal was able to demonstrate them prior to the end of the session.  Chantal demonstrated fair understanding of the education provided.     See EMR under Patient Instructions for exercises provided 5/30/24    Assessment    Chantal returns after a 2 1/2 week absence from therapy with continued c/o moderate to severe lower back/ R hip and knee pain. She is being followed by pain  medicine team for these symptoms as well.  Continues to present with an antalgic gait on R.  Treatment continued with flexibility, strengthening and neuromuscular reeducation ex's. She is guarded with mobility and ex's due to pain. Slight/temporary decrease in R leg symptoms with long axis distraction. Pt continued with back strengthening using Precor machine at 45#'s completing 20 reps with a RPE = 3/10.  She performed peripheral strengthening ex's. (Leg ext, leg curl, chest press not performed). Will continue per HB protocol and patient tolerance.    Anticipated Barriers for therapy: transportation/pain level   Pt's spiritual, cultural and educational needs considered and pt agreeable to plan of care and goals as stated below:       GOALS: Pt is in agreement with the following goals.     Short term goals:  6 weeks or 10 visits   - Pt will demonstrate increased lumbar MedX ROM by at least 3 degrees from the initial ROM value with improvements noted in functional ROM and ability to perform ADLs. MET  - Pt will demonstrate increased MedX average isometric strength value by 20% from initial test resulting in improved ability to perform bending, lifting, and carrying activities safely, confidently. Appropriate and Ongoing  - Pt will report a reduction in worst pain score by 1-2 points for improved tolerance for standing. Appropriate and Ongoing  - Pt able to perform HEP correctly with minimal cueing or supervision from therapist to encourage independent management of symptoms. Appropriate and Ongoing     Long term goals: 10 weeks or 20 visits   - Pt will demonstrate increased lumbar MedX ROM by at least 9 degrees from initial ROM value, resulting in improved ability to perform functional forward bending while standing and sitting.MET  - Pt will demonstrate increased MedX average isometric strength value by 40% from initial test resulting in improved ability to perform bending, lifting, and carrying activities safely  and confidently. Appropriate and Ongoing  - Pt to demonstrate ability to independently control and reduce their pain through posture positioning and mechanical movements throughout a typical day. Appropriate and Ongoing  - Pt will demonstrate reduced pain and improved functional outcomes as reported on the FOTO by reaching an intake score of >/= 30% functional ability in order to demonstrate subjective improvement in patient's condition. . Appropriate and Ongoing  - Pt will demonstrate independence with the HEP at discharge. Appropriate and Ongoing  - Pt(patient goal)will be able to walk without AD > 5 minutes without sitting  Appropriate and Ongoing    Plan   Continue with established Plan of Care towards established PT goals.     Therapist: Alfa Betancourt, PTA  7/16/2024

## 2024-07-16 NOTE — PROGRESS NOTES
PT/PTA met face to face to discuss pt's treatment plan and progress towards established goals. Pt will be seen by a physical therapist minimally every 6th visit or every 30 days.    Alfa Betancourt PTA

## 2024-07-18 ENCOUNTER — CLINICAL SUPPORT (OUTPATIENT)
Dept: REHABILITATION | Facility: HOSPITAL | Age: 74
End: 2024-07-18
Payer: MEDICARE

## 2024-07-18 ENCOUNTER — TELEPHONE (OUTPATIENT)
Dept: PAIN MEDICINE | Facility: CLINIC | Age: 74
End: 2024-07-18
Payer: MEDICARE

## 2024-07-18 DIAGNOSIS — M25.69 DECREASED ROM OF TRUNK AND BACK: Primary | ICD-10-CM

## 2024-07-18 PROCEDURE — 97112 NEUROMUSCULAR REEDUCATION: CPT | Mod: CQ

## 2024-07-18 PROCEDURE — 97110 THERAPEUTIC EXERCISES: CPT | Mod: CQ

## 2024-07-18 NOTE — TELEPHONE ENCOUNTER
Staff contacted patient and scheduled an appointment. She requested at least 3 days because of transportation.

## 2024-07-18 NOTE — TELEPHONE ENCOUNTER
----- Message from Ani Thakur sent at 7/18/2024  9:46 AM CDT -----  Regarding: call back request  Name of caller: Chantal       What is the requesting detail: pt is requesting a call back. States she is still in a great deal of pain Following  her knee injection. Please give her a call back to further discuss.       Can the clinic reply by [facility]:       What number to call back:[phone]

## 2024-07-18 NOTE — PROGRESS NOTES
"  [facility]      Name: Chantal Ridley  Clinic Number: [id number]    Therapy Diagnosis:   Encounter Diagnosis   Name Primary?    Decreased ROM of trunk and back Yes       Physician: Jeronimo Rodrigez MD    Visit Date: 2024  Physician Orders: PT Eval and Treat  Medical Diagnosis from Referral:   Diagnosis   M47.816 (ICD-10-CM) - Lumbar spondylosis      Evaluation Date: 2024  Authorization Period Expiration: 24  Plan of Care Expiration: 2024 sent 24-24  Reassessment Due:24  Visit # / Visits authorized:  (lumbar roll on MedX- used precor for this visit due to flare up and ease of getting on and off machine)  MedX testing visit 2     Time In: 1:30 PM   Time Out:  *2:30  PM  Total Billable Time: 50 min  on   INSURANCE and OUTCOMES: Fee for Service with FOTO Outcomes 2/3     Precautions: standard, HTN, and diabetes     Pattern of pain determined: 1PEN    Subjective   Chantal reports continued c/o moderate to severe pain to lower back, R hip and knee. States that had an x-ray after last treatment which resulted in increased pain with various positions required.    Patient reports tolerating previous visit: Min soreness  Patient reports their pain to be 8/10 on a 0-10 scale with 0 being no pain and 10 being the worst pain imaginable.  Pain Location: low back, right hip  Social History:  lives alone in shotgun home, has hand rails and 3 steps into house  Occupation: retired  Leisure: visit with friends   Pt goals: " decrease pain"     Objective     Baseline IM Testing Results:   Date of testin24  ROM 12-30 deg   Max Peak Torque 92    Min Peak Torque 69    Flex/Ext Ratio 1.3:1   % below normative data 23     Mid IM Testing Results:   Date of testin24  ROM  deg   Max Peak Torque 117   Min Peak Torque 75   Flex/Ext Ratio 1.5:1   % below normative data 9 with 11% gain in strength       MOVEMENT LOSS - Lumbar    Norms ROM Loss Initial " 6/25/24   Flexion Fingers touch toes, sacral angle >/= 70 deg, uniform spinal curvature, posterior weight shift  moderate loss and major loss Mod loss   Extension ASIS surpasses toes, spine of scapulae surpasses heels, uniform spinal curve major loss, inc pain LB/RLE Major loss   Side glide Right   moderate loss Mod loss   Side glide Left   moderate loss Mod loss   Rotation Right PT observes contralateral shoulder major loss Mod loss   Rotation Left PT observes contralateral shoulder major loss Mod loss      Right hip: pain with flex/abd/int rot/and ext rotation  Difficulty putting socks and shoes on  Unable to SLS without support    OUTCOMES SELECTION:   Intake Outcome Measure for FOTO Lumbar Survey     Therapist reviewed FOTO scores for Chantal Ridley on 4/18/2024.   FOTO documents entered into [employer] - see Media section.     Intake Score: 13% functional ability  visit 6 9%  Goal Score: 30% functional ability         Treatment    Chantal received the treatments listed below:      Chantal received neuromuscular education  to isolate and engage spinal stabilization musculature correctly for motor control and coordination to aid in function and posture for 10 minutes on the Medical eMar Machine.  Patient performed MedX dynamic exercise with emphasis on spinal muscular control using pacer throughout  active range of motion. Therapist assisted patient in achieving optimal exertion for neural reeducation and endurance training by using the  Nandini Exertion Rating scale, by instructing the patient to aim for mid range of exertion, performing 15-20 repetitions, slowly, correctly,and safely.          7/16/2024     4:22 PM   [facility] - Short   Visit Number 14   VAS Pain Rating 8   Time 10   Lumbar Stretches - Slouch 10   Flexion in Lying 10   Lumbar Weight 45 lbs   Repetitions 20   Rating of Perceived Exertion 3      therapeutic exercises to develop strength, endurance, ROM, flexibility, posture, and core  stabilization for 40 minutes including:    LTR 10x  BKTC c/ ball  Long axis distraction R LE  Bridging x 15 (minimal lift off) c/ RTB   BKFO c/ RTB 10x   SOC x 10  + seated trunk flexion with swiss ball x 10    Not performed today  PPT 10x--NP  TrA activation with T-ball x 10- NP  HSS 2x30 --NP  Piriformis stretch 2x 30--NP  Standing abd /ext 10x--NP  LAQ 2# 20x R--NP  Seated hip flex 2# 10 reps--NP  Seated knee extension 20x 2# R--NP      Peripheral muscle strengthening which included 1 set of 15-20 repetitions at a slow, controlled 10-13 second per rep pace focused on strengthening supporting musculature for improved body mechanics and functional mobility.  Pt and therapist focused on proper form during treatment to ensure optimal strengthening of each targeted muscle group.  She was able to complete full circuit of peripheral machines today with increased time.    cold pack for 10 minutes to LB/ R thigh and knee    Home Exercises Provided and Patient Education Provided   Home exercises include:LTR, bridge, laq, ball squeeze, walking  Cardio program:5/16/24 and encouraged walking in home 5/30/24  Lifting education date:TBD [employer] Discharge handout (date given):  Equipment at home/gym membership: no    Education provided:   -  cues w/exs  - MedX performance  - Precor ex performance  - HB Protocol  -walking more in home, moving    Written Home Exercises Provided: Patient instructed to cont prior HEP.  Exercises were reviewed and Chantal was able to demonstrate them prior to the end of the session.  Chantal demonstrated fair understanding of the education provided.     See EMR under Patient Instructions for exercises provided 5/30/24    Assessment    Chantal returns with continued c/o moderate to severe lower back/ R hip and knee pain. She is being followed by pain medicine team for these symptoms as well.  Continues to present with an antalgic gait on R.  Treatment continued with flexibility, strengthening and  neuromuscular reeducation ex's. Added seated trunk flex stretch with swiss ball. She is guarded with mobility and ex's due to pain. Slight/temporary decrease in R leg symptoms with long axis distraction. Pt continued with back strengthening using Precor machine with increased resistance to 50#'s completing 15reps with a RPE = 3/10.  She performed peripheral strengthening ex's. (Leg ext, leg curl, chest press not performed). Will continue per HB protocol and patient tolerance.     Anticipated Barriers for therapy: transportation/pain level   Pt's spiritual, cultural and educational needs considered and pt agreeable to plan of care and goals as stated below:       GOALS: Pt is in agreement with the following goals.     Short term goals:  6 weeks or 10 visits   - Pt will demonstrate increased lumbar MedX ROM by at least 3 degrees from the initial ROM value with improvements noted in functional ROM and ability to perform ADLs. MET  - Pt will demonstrate increased MedX average isometric strength value by 20% from initial test resulting in improved ability to perform bending, lifting, and carrying activities safely, confidently. Appropriate and Ongoing  - Pt will report a reduction in worst pain score by 1-2 points for improved tolerance for standing. Appropriate and Ongoing  - Pt able to perform HEP correctly with minimal cueing or supervision from therapist to encourage independent management of symptoms. Appropriate and Ongoing     Long term goals: 10 weeks or 20 visits   - Pt will demonstrate increased lumbar MedX ROM by at least 9 degrees from initial ROM value, resulting in improved ability to perform functional forward bending while standing and sitting.MET  - Pt will demonstrate increased MedX average isometric strength value by 40% from initial test resulting in improved ability to perform bending, lifting, and carrying activities safely and confidently. Appropriate and Ongoing  - Pt to demonstrate ability to  independently control and reduce their pain through posture positioning and mechanical movements throughout a typical day. Appropriate and Ongoing  - Pt will demonstrate reduced pain and improved functional outcomes as reported on the FOTO by reaching an intake score of >/= 30% functional ability in order to demonstrate subjective improvement in patient's condition. . Appropriate and Ongoing  - Pt will demonstrate independence with the HEP at discharge. Appropriate and Ongoing  - Pt(patient goal)will be able to walk without AD > 5 minutes without sitting  Appropriate and Ongoing    Plan   Continue with established Plan of Care towards established PT goals.     Therapist: Alfa Betancourt, PTA  7/18/2024

## 2024-07-23 ENCOUNTER — CLINICAL SUPPORT (OUTPATIENT)
Dept: REHABILITATION | Facility: HOSPITAL | Age: 74
End: 2024-07-23
Payer: MEDICARE

## 2024-07-23 DIAGNOSIS — M25.69 DECREASED ROM OF TRUNK AND BACK: Primary | ICD-10-CM

## 2024-07-23 PROCEDURE — 97112 NEUROMUSCULAR REEDUCATION: CPT

## 2024-07-23 PROCEDURE — 97110 THERAPEUTIC EXERCISES: CPT

## 2024-07-23 NOTE — PROGRESS NOTES
"  [facility]      Name: Chantal Ridley  Clinic Number: [id number]    Therapy Diagnosis:   Encounter Diagnosis   Name Primary?    Decreased ROM of trunk and back Yes         Physician: Jeronimo Rodrigez MD    Visit Date: 2024  Physician Orders: PT Eval and Treat  Medical Diagnosis from Referral:   Diagnosis   M47.816 (ICD-10-CM) - Lumbar spondylosis      Evaluation Date: 2024  Authorization Period Expiration: 24  Plan of Care Expiration: 2024 sent 24-24  Reassessment Due:24  Visit # / Visits authorized: 15/20 (lumbar roll on MedX- used precor for this visit due to flare up and ease of getting on and off machine)  MedX testing visit 2     Time In: 2:20 PM (pt 20 min late)  Time Out:  3  Total Billable Time: 30 min 1 on   INSURANCE and OUTCOMES: Fee for Service with FOTO Outcomes 2/3     Precautions: standard, HTN, and diabetes     Pattern of pain determined: 1PEN    Subjective   Chantal reports she is aggravated with her transportation services secondary to her being late for her appt.  Pt also expressing frustration with continuing pain without significant relief.    Patient reports tolerating previous visit: Min soreness  Patient reports their pain to be 8/10 on a 0-10 scale with 0 being no pain and 10 being the worst pain imaginable.  Pain Location: low back, right hip  Social History:  lives alone in shotgun home, has hand rails and 3 steps into house  Occupation: retired  Leisure: visit with friends   Pt goals: " decrease pain"     Objective     Baseline IM Testing Results:   Date of testin24  ROM 12-30 deg   Max Peak Torque 92    Min Peak Torque 69    Flex/Ext Ratio 1.3:1   % below normative data 23     Mid IM Testing Results:   Date of testin24  ROM  deg   Max Peak Torque 117   Min Peak Torque 75   Flex/Ext Ratio 1.5:1   % below normative data 9 with 11% gain in strength       MOVEMENT LOSS - Lumbar    Norms ROM Loss " Initial 6/25/24   Flexion Fingers touch toes, sacral angle >/= 70 deg, uniform spinal curvature, posterior weight shift  moderate loss and major loss Mod loss   Extension ASIS surpasses toes, spine of scapulae surpasses heels, uniform spinal curve major loss, inc pain LB/RLE Major loss   Side glide Right   moderate loss Mod loss   Side glide Left   moderate loss Mod loss   Rotation Right PT observes contralateral shoulder major loss Mod loss   Rotation Left PT observes contralateral shoulder major loss Mod loss      Right hip: pain with flex/abd/int rot/and ext rotation  Difficulty putting socks and shoes on  Unable to SLS without support    OUTCOMES SELECTION:   Intake Outcome Measure for FOTO Lumbar Survey     Therapist reviewed FOTO scores for Chantal Ridley on 4/18/2024.   FOTO documents entered into EPIC - see Media section.     Intake Score: 13% functional ability  visit 6 9%  Goal Score: 30% functional ability         Treatment    Chantal received the treatments listed below:      Chantal received neuromuscular education  to isolate and engage spinal stabilization musculature correctly for motor control and coordination to aid in function and posture for 10 minutes on the Medical [employer] Machine.  Patient performed MedX dynamic exercise with emphasis on spinal muscular control using pacer throughout  active range of motion. Therapist assisted patient in achieving optimal exertion for neural reeducation and endurance training by using the  Nandini Exertion Rating scale, by instructing the patient to aim for mid range of exertion, performing 15-20 repetitions, slowly, correctly,and safely.        7/23/2024     6:24 AM   [facility]   Visit Number 15   VAS Pain Rating 8   Time 7   Flexion in Lying 10   Lumbar Weight 50 lbs   Repetitions 15   Rating of Perceived Exertion 3   Ice - Z Lie (in min.) 5       therapeutic exercises to develop strength, endurance, ROM, flexibility, posture, and core stabilization  for 30minutes including:    LTR 10x  BKTC c/ ball  Long axis distraction R LE  Bridging x 15 (minimal lift off) c/ RTB   BKFO c/ RTB 10x   SOC x 10  + seated trunk flexion with swiss ball x 10    Not performed today  PPT 10x--NP  TrA activation with T-ball x 10- NP  HSS 2x30 --NP  Piriformis stretch 2x 30--NP  Standing abd /ext 10x--NP  LAQ 2# 20x R--NP  Seated hip flex 2# 10 reps--NP  Seated knee extension 20x 2# R--NP      Peripheral muscle strengthening which included 1 set of 15-20 repetitions at a slow, controlled 10-13 second per rep pace focused on strengthening supporting musculature for improved body mechanics and functional mobility.  Pt and therapist focused on proper form during treatment to ensure optimal strengthening of each targeted muscle group.  She was able to complete full circuit of peripheral machines today with increased time.    cold pack for 10 minutes to LB/ R thigh and knee    Home Exercises Provided and Patient Education Provided   Home exercises include:LTR, bridge, laq, ball squeeze, walking  Cardio program:5/16/24 and encouraged walking in home 5/30/24  Lifting education date:TBD Fridge Magnet Discharge handout (date given):  Equipment at home/gym membership: no    Education provided:   -  cues w/exs  - MedX performance  - Precor ex performance  - HB Protocol  -walking more in home, moving    Written Home Exercises Provided: Patient instructed to cont prior HEP.  Exercises were reviewed and Chantal was able to demonstrate them prior to the end of the session.  Chantal demonstrated fair understanding of the education provided.     See EMR under Patient Instructions for exercises provided 5/30/24    Assessment    Chantal returns with continued c/o moderate to severe lower back/ R hip and knee pain.  Continues to demonstrate with an antalgic gait on R.  Treatment continued with flexibility, strengthening and neuromuscular reeducation ex's. Pt continued with back strengthening using Precor  machine with increased resistance to 50#'s completing 15reps with a RPE = 3/10.  She performed peripheral strengthening ex's. (Leg ext, leg curl, chest press not performed). Will continue per HB protocol and patient tolerance.     Anticipated Barriers for therapy: transportation/pain level   Pt's spiritual, cultural and educational needs considered and pt agreeable to plan of care and goals as stated below:       GOALS: Pt is in agreement with the following goals.     Short term goals:  6 weeks or 10 visits   - Pt will demonstrate increased lumbar MedX ROM by at least 3 degrees from the initial ROM value with improvements noted in functional ROM and ability to perform ADLs. MET  - Pt will demonstrate increased MedX average isometric strength value by 20% from initial test resulting in improved ability to perform bending, lifting, and carrying activities safely, confidently. Appropriate and Ongoing  - Pt will report a reduction in worst pain score by 1-2 points for improved tolerance for standing. Appropriate and Ongoing  - Pt able to perform HEP correctly with minimal cueing or supervision from therapist to encourage independent management of symptoms. Appropriate and Ongoing     Long term goals: 10 weeks or 20 visits   - Pt will demonstrate increased lumbar MedX ROM by at least 9 degrees from initial ROM value, resulting in improved ability to perform functional forward bending while standing and sitting.MET  - Pt will demonstrate increased MedX average isometric strength value by 40% from initial test resulting in improved ability to perform bending, lifting, and carrying activities safely and confidently. Appropriate and Ongoing  - Pt to demonstrate ability to independently control and reduce their pain through posture positioning and mechanical movements throughout a typical day. Appropriate and Ongoing  - Pt will demonstrate reduced pain and improved functional outcomes as reported on the FOTO by reaching an  intake score of >/= 30% functional ability in order to demonstrate subjective improvement in patient's condition. . Appropriate and Ongoing  - Pt will demonstrate independence with the HEP at discharge. Appropriate and Ongoing  - Pt(patient goal)will be able to walk without AD > 5 minutes without sitting  Appropriate and Ongoing    Plan   Continue with established Plan of Care towards established PT goals.     Therapist: Paola Temple, PT  7/23/2024

## 2024-07-24 ENCOUNTER — OFFICE VISIT (OUTPATIENT)
Dept: PAIN MEDICINE | Facility: CLINIC | Age: 74
End: 2024-07-24
Payer: MEDICARE

## 2024-07-24 VITALS
SYSTOLIC BLOOD PRESSURE: 163 MMHG | BODY MASS INDEX: 26.91 KG/M2 | OXYGEN SATURATION: 98 % | WEIGHT: 142.44 LBS | HEART RATE: 53 BPM | TEMPERATURE: 99 F | DIASTOLIC BLOOD PRESSURE: 77 MMHG | RESPIRATION RATE: 18 BRPM

## 2024-07-24 DIAGNOSIS — M17.11 PRIMARY OSTEOARTHRITIS OF RIGHT KNEE: Primary | ICD-10-CM

## 2024-07-24 DIAGNOSIS — M54.17 LUMBOSACRAL RADICULOPATHY: ICD-10-CM

## 2024-07-24 PROCEDURE — 1159F MED LIST DOCD IN RCRD: CPT | Mod: CPTII,S$GLB,,

## 2024-07-24 PROCEDURE — 3008F BODY MASS INDEX DOCD: CPT | Mod: CPTII,S$GLB,,

## 2024-07-24 PROCEDURE — 4010F ACE/ARB THERAPY RXD/TAKEN: CPT | Mod: CPTII,S$GLB,,

## 2024-07-24 PROCEDURE — 3077F SYST BP >= 140 MM HG: CPT | Mod: CPTII,S$GLB,,

## 2024-07-24 PROCEDURE — 99215 OFFICE O/P EST HI 40 MIN: CPT | Mod: S$GLB,,,

## 2024-07-24 PROCEDURE — 1125F AMNT PAIN NOTED PAIN PRSNT: CPT | Mod: CPTII,S$GLB,,

## 2024-07-24 PROCEDURE — 3078F DIAST BP <80 MM HG: CPT | Mod: CPTII,S$GLB,,

## 2024-07-24 PROCEDURE — 99999 PR PBB SHADOW E&M-EST. PATIENT-LVL V: CPT | Mod: PBBFAC,,,

## 2024-07-24 PROCEDURE — 1101F PT FALLS ASSESS-DOCD LE1/YR: CPT | Mod: CPTII,S$GLB,,

## 2024-07-24 PROCEDURE — 1160F RVW MEDS BY RX/DR IN RCRD: CPT | Mod: CPTII,S$GLB,,

## 2024-07-24 PROCEDURE — 3288F FALL RISK ASSESSMENT DOCD: CPT | Mod: CPTII,S$GLB,,

## 2024-07-24 NOTE — PROGRESS NOTES
Chronic patient Established Note (Follow up visit)      SUBJECTIVE:  Interval History 7/24/2024:  Chantal Ridley returns to clinic for follow-up of chronic pain and after right knee steroid injection on 7/11/2024. She reports no relief and actually feels as though the knee hurts more. She states her main source of pain is to the right anterior thigh. She states the pain feels tight and as if a heavy object is sitting on her thigh. The pain is worse with waling and standing. She is takes Morphine 30 mg BID PRN without relief. She states she needs more medication than everyone else because she has always had a high tolerance to pain medication. She denies any perceived side effects. She is currently participating in the [facility] Program. She is establishing care with Dr Mays on 9/5/2024 and following up with Dr Rodrigez on 10/1/2024. She has not been able to establish care with Dr Ward to discuss pain pump. She is very frustrated with her pain and how it limits her ability to perform her ADLs and care for herself. She has had epidurals in the past with limited relief and she is not interested in additional epidurals at this time. Her son recently came to [facility] to stay with her for a few months. She denies new onset fever/night sweats, urinary incontinence, bowel incontinence, significant weight changes, significant motor weakness or changes, or loss of sensations. She denies recent falls or trauma. Her pain today is 10/10.     Interval History 7/11/2024:  Chantal Ridley reutrns to clinic for follow-up of chronic pain and right knee pain. She states her right knee has been giving her problems with walking and standing. The pain is aching in nature. She would like a steroid injection today.  She would also like to discuss switching her medications with Dr Valdez. She is also interested in a referral to Dr Ward's office to be evaluated for a pain pump.  The patient denies fever/night sweats, urinary  incontinence, bowel incontinence, significant weight changes, significant motor weakness or changes, or loss of sensations. Her pain today is 10/10    Interval History 7/1/2024:  Chantal Ridley returns to clinic for follow-up of chronic pain. She is upset today because her appointment with Dr Valdez to discuss pain medication management was rescheduled and she is now seeing the NP.  She reports that the Dilaudid that was previously prescribed, 4 mg TID PRN is not helping and she has not been taking it.  She states she lives in constant pain and can barely walk across her house to make herself food or a cup of coffee. She does not want to proceed with previously discussed SCS trial. She has researched a pain pump through [facility] and wishes to proceed with this procedure.  She would like another referral to Ortho to discuss her hip pain as Dr Faye is no longer performing surgeries.  She would also like a referral to see another NSGY provider although she is not interested in surgery at this time.  She would also like to switch pain providers at [facility] to Dr Rivers at [place] as this is a more convenient location and she states her podiatrist originally wanted her to see Dr Rivers. She had some leftover Percocet and has taken this with some relief.  She would like to switch to Percocet. She also takes Lyrica with some relief and would like to see if she can increase this dose.  She continues the Healthy Back Program with good relief. The patient denies fever/night sweats, urinary incontinence, bowel incontinence, significant weight changes, significant motor weakness or changes, or loss of sensations. Her pain today is 10/10.     Interval History 5/29/2024.   She returns for follow-up.  She said she meets understood and took both oxycodone and Dilaudid.  She does not think the oxycodone helps at all.  She thinks the Dilaudid helps a little.  She has not been using any over-the-counter  medications.  She continues on pregabalin.  She has severe pain that is interfering with her activities of daily living to a great extent.  Says she has difficulties with cooking and hygiene among other things.  Denied having any new bowel or bladder symptomatology.  Pain is mostly around the right hip and right thigh.  She does have lower back pain as well as some radicular symptoms down the right lower extremities with tingling in the great toe.      Interval History 4/9/2024:  The patient is here for follow up of back pain. The pain continues to radiate down the back of the right leg. She had no benefit with HERACLIO in the past. Since previous encounter, she did f/u with Dr. Rodrigez and she did not wish to pursue surgical options at this time. He referred her to Ortho to have her right hip checked. She saw Dr. Faye who does not think that her hip is the issue. He believes that her primary cause is her back, which I agree with. She was given a Toradol shot last month by podiatry which she says did not help her at all for any area of pain. She is starting [facility] on 4/18/24. She is taking Percocet 10/325 mg BID which she says does not help very much. She is asking about ITP opioid pain medications. Her pain today is 10/10.    Interval History 2/19/2024:  The patient is here for follow up of right sided back and leg pain. She is s/p right L3/4 and L4/5 TF HERACLIO with no relief, not even short term. She actually feels like it worsened her pain. Leg pain is greater than back pain. She continues to have right sided back pain with radiation down the front of the right leg. She has associated numbness and weakness. She does have some symptoms on the left, but the right side is the worse. She did see Dr. Rodrigez last year who discussed surgery if epidural did not help. [place] She continues to take Percocet BID with mild benefit. She stopped Gabapentin due to minimal  benefit. She is requesting Naproxen as she feels like it was helpful in the past. Her pain today is 10/10.    Interval History 1/16/2024:  Chantal Ridley presents to the clinic for a follow-up appointment for hip pain. She is s/p right hip joint injection on 12/18/2023. She reports relief for 2 weeks. Then, her pain returned to baseline. She continues to report low back pain that radiates into the anterolateral aspect of her right thigh to her knee. She denies any left leg pain. She does have right hip pain. Her pain is worse with standing and walking. She also reports pain with moving from sitting to standing. She is currently taking Percocet as needed with benefit. She denies any adverse effects. She denies any other health changes. Her pain today is 10/10.    HPI  A former patient of Dr. Garland who was on Percocet for chronic pain.  She was given 7.5 to use twice a day.  They had recommended interventions but she declined.  She is interested in interventions if they would help.  She has chronic right lower back pain that radiates to the right groin and thigh.  It also extends this down to the foot anterior and dorsally.  There is sometimes tingling but no numbness.  She does not appreciate weakness.  She is stiff when she gets up and takes her a while to get moving.  There is no imaging of her hip but she thinks she had 1 at [facility].  She had therapy in the remote past and has not been following with the exercises.  She is interested in getting back into therapy.    Pain Disability Index Review:      7/24/2024     2:03 PM 7/11/2024     2:56 PM 5/27/2024     1:34 PM   Last 3 PDI Scores   Pain Disability Index (PDI) 66 70 60       Pain Medications:  Percocet-not helpful  Hydromorphone- not helpful  MS Contin    Opioid Contract: yes     report:  Reviewed and consistent with medication use as prescribed.    Pain Procedures:   12/18/2023- Right hip joint injection  2/5/24 Right L3/4 and L4/5 TF HERACLIO- no  relief  5/13/2024 - Caudal HERACLIO - no relief  7/11/2024 - Right Knee CSI (Eissa) - no relief    Physical Therapy/Home Exercise: yes, [facility] currently    Imaging:     XR LUMBAR SPINE AP AND LAT WITH FLEX/EXT     FINDINGS:  DJD with bridging osteophytosis.  The L2/L3 and the L5/S1 disc spaces are significantly narrowed.  Narrowed disc spaces also noted between the 12 and L2 vertebral segment.  No fracture, or dislocation.  There is a few mm L1/L2 and L2/L3 retrolisthesis.  No bone destruction identified     Impression:     See above        Electronically signed by:Alfa Fiore MD  Date:                                            07/17/2024  Time:                                           10:19    MRI LUMBAR SPINE WITHOUT CONTRAST     FINDINGS:  Alignment: Leftward curvature of the lumbar spine.  Reversal of curvature thoracolumbar level     Vertebrae: No marrow replacing infiltrative process.  No fracture.     Discs: Disc space narrowing T12-L1 L1-L2 L2-L3 with discogenic degenerative changes at the endplates.     Cord: Normal contour and signal.  Conus terminates at L1-L2     Degenerative findings:     T12-L1: Circumferential disc bulge, ligamentum flavum buckling, and bilateral facet arthropathy.  No spinal canal stenosis or neural foraminal narrowing.     L1-L2: Circumferential disc bulge, ligamentum flavum buckling, and bilateral facet arthropathy.  Findings result in mild spinal canal stenosis.  Partial effacement of the inferior perineural fat adjacent to the bilateral exiting nerve roots, consistent with mild bilateral neural foraminal narrowing.     L2-L3: Circumferential disc bulge, ligamentum flavum buckling, and bilateral facet arthropathy.  Findings result in moderate spinal canal stenosis.  Complete effacement of the right perineural fat consistent with severe right neural foraminal narrowing.  Partial effacement of the anterior fat adjacent to the left exiting nerve root, consistent with mild left  neural foraminal narrowing.     L3-L4: Circumferential disc bulge, ligamentum flavum buckling, and bilateral facet arthropathy.  Posterior projecting synovial cyst arising from the right facet.  Severe spinal canal stenosis.  Near complete effacement of the bilateral perineural fat, consistent with moderate bilateral neural foraminal narrowing.     L4-L5: Circumferential disc bulge, ligamentum flavum buckling, and bilateral facet arthropathy.  Moderate-severe spinal canal stenosis.     L5-S1: Circumferential disc bulge, ligamentum flavum buckling, and bilateral facet arthropathy.  No spinal canal stenosis.  Near complete effacement of the left perineural fat, consistent with moderate left neural foraminal narrowing.  Partial effacement of the perineural fat along the inferior aspect of the exiting right nerve root, consistent with mild right neural foraminal narrowing.     Paraspinal muscles & soft tissues: Unremarkable.     Impression:     1. Degenerative changes of the lumbar spine most pronounced at L3-L4 with severe spinal canal stenosis and moderate bilateral neural foraminal narrowing.  2. Additional findings, as above.     Electronically signed by resident: Franchesca Pierre  Date:                                            06/20/2024  Time:                                           16:38     Electronically signed by:Aj Benitez MD  Date:                                            06/20/2024  Time:                                           16:54    Xray Hips 11/21/2023:  CLINICAL HISTORY:  Pain in right hip     FINDINGS:  Two views right hip: There is severe DJD and impingement change.  No fracture dislocation bone destruction seen.      Allergies:   Review of patient's allergies indicates:   Allergen Reactions    Ace inhibitors Anaphylaxis, Itching and Swelling    Amlodipine Swelling    Amoxicillin Anaphylaxis    Erythromycin Swelling and Hives     Tongue swelling    Penicillins Anaphylaxis    Losartan Hives      Headaches. Patient is currently taking Losartan for high b/p and states they are feeling fine.    Tramadol Itching    Olmesartan Rash     Other reaction(s): Skin Rashes / Eruption of skin, Benicar       Current Medications:   Current Outpatient Medications   Medication Sig Dispense Refill    diclofenac sodium (SOLARAZE) 3 % gel Apply topically 2 (two) times daily.      estrogen, conjugated,-medroxyprogesterone 0.625-2.5mg (PREMPRO) 0.625-2.5 mg per tablet Take 1 tablet by mouth once daily.      estrogen, conjugated,-medroxyprogesterone 0.625-2.5mg (PREMPRO) 0.625-2.5 mg per tablet Take 1 tablet by mouth once daily. 30 tablet 12    fluconazole (DIFLUCAN) 150 MG Tab       hydrOXYzine pamoate (VISTARIL) 25 MG Cap Take 25 mg by mouth daily as needed.      irbesartan (AVAPRO) 150 MG tablet Take 150 mg by mouth every evening.      irbesartan (AVAPRO) 300 MG tablet Take 300 mg by mouth.      LIDOcaine (LIDODERM) 5 % Place 1 patch onto the skin once daily. Remove & Discard patch within 12 hours or as directed by MD 30 patch 6    LIDOcaine-prilocaine (EMLA) cream Apply topically as needed.      morphine (MS CONTIN) 30 MG 12 hr tablet Take 1 tablet (30 mg total) by mouth every 12 (twelve) hours as needed for Pain (for severe pain). 60 tablet 0    phenyleph-min oil-petrolatum 0.25-14-74.9 % Oint Place 1 Application rectally.      phenylephrine-cocoa butter 0.25-88.44 % Supp suppository Place 1 suppository rectally.      pregabalin (LYRICA) 150 MG capsule Take 1 capsule (150 mg total) by mouth 2 (two) times daily. 60 capsule 0    pregabalin (LYRICA) 75 MG capsule Take 1 capsule (75 mg total) by mouth 2 (two) times daily. 60 capsule 1    sitagliptan-metformin (JANUMET)  mg per tablet Take 1 tablet by mouth 2 (two) times daily with meals.      tiZANidine (ZANAFLEX) 4 MG tablet Take 4 mg by mouth every 8 (eight) hours.      amitriptyline (ELAVIL) 10 MG tablet Take 1 tablet (10 mg total) by mouth nightly as needed for  Insomnia. 30 tablet 2    diazePAM (VALIUM) 5 MG tablet Take 1 tablet (5 mg total) by mouth once. Take medication 30 minutes prior to procedure  for 1 dose 1 tablet 0     No current facility-administered medications for this visit.     Facility-Administered Medications Ordered in Other Visits   Medication Dose Route Frequency Provider Last Rate Last Admin    0.9%  NaCl infusion   Intravenous Continuous Chris Farmer MD           REVIEW OF SYSTEMS:    GENERAL:  No weight loss, malaise or fevers.  HEENT:  Negative for frequent or significant headaches.  NECK:  Negative for lumps, goiter, pain and significant neck swelling.  RESPIRATORY:  Negative for cough, wheezing or shortness of breath.  CARDIOVASCULAR:  Negative for chest pain, leg swelling or palpitations. HTN  GI:  Negative for abdominal discomfort, blood in stools or black stools or change in bowel habits.  MUSCULOSKELETAL:  See HPI.  SKIN:  Negative for lesions, rash, and itching.  PSYCH:  Negative for sleep disturbance, mood disorder and recent psychosocial stressors.  ENDO: Diabetes  HEMATOLOGY/LYMPHOLOGY:  Negative for prolonged bleeding, bruising easily or swollen nodes.  NEURO:   No history of headaches, syncope, paralysis, seizures or tremors.  All other reviewed and negative other than HPI.    Past Medical History:  Past Medical History:   Diagnosis Date    Diabetes mellitus     Fibromyalgia     Hx of degenerative disc disease     neck & back    Hypertension        Past Surgical History:  Past Surgical History:   Procedure Laterality Date    CATARACT EXTRACTION W/  INTRAOCULAR LENS IMPLANT  05/17/2016    EPIDURAL STEROID INJECTION N/A 5/13/2024    Procedure: CAUDAL HERACLIO WITH CATH;  Surgeon: Ike Valdez MD;  Location: [facility];  Service: Pain Management;  Laterality: N/A;  [phone]  2 WK F/U MICHAEL    INJECTION OF JOINT Right 12/18/2023    Procedure: INJECTION, JOINT RIGHT HIP;  Surgeon: Ike Valdez MD;  Location: [facility];  Service: Pain  Management;  Laterality: Right;  [phone]    TRANSFORAMINAL EPIDURAL INJECTION OF STEROID Right 2024    Procedure: LUMBAR TRANSFORAMINAL RIGHT L3/4 AND L4/5;  Surgeon: Ike Valdez MD;  Location: [facility];  Service: Pain Management;  Laterality: Right;  [phone]  2 WK F/U SIGIFREDO    TUBAL LIGATION         Family History:  No family history on file.    Social History:  Social History     Socioeconomic History    Marital status: Single   Tobacco Use    Smoking status: Former     Current packs/day: 0.00     Types: Cigarettes     Quit date:      Years since quittin.5    Smokeless tobacco: Never   Substance and Sexual Activity    Alcohol use: No    Drug use: No     Social Determinants of Health     Financial Resource Strain: Unknown (2023)    Received from [facility]    Overall Financial Resource Strain (CARDIA)     Difficulty of Paying Living Expenses: Patient declined   Food Insecurity: Unknown (2023)    Received from [facility]    Hunger Vital Sign     Worried About Running Out of Food in the Last Year: Patient declined     Ran Out of Food in the Last Year: Patient declined   Transportation Needs: Unmet Transportation Needs (2023)    Received from [facility]    PRAPARE - Transportation     Lack of Transportation (Medical): No     Lack of Transportation (Non-Medical): Yes   Stress: No Stress Concern Present (2023)    Received from [facility]    [employer] - Occupational Stress Questionnaire     Feeling of Stress : Not at all   Housing Stability: Unknown (2023)    Received from [facility]    Housing Stability Vital Sign     Unable to Pay for Housing in the Last Year: No     In the last 12 months, was there a time when you did not have a steady place to sleep or slept in a shelter (including now)?: No       OBJECTIVE:    BP (!) 163/77   Pulse (!) 53   Temp 98.6 °F (37 °C)    Resp 18   Wt 64.6 kg (142 lb 6.7 oz)   SpO2 98%   BMI 26.91 kg/m²     PHYSICAL EXAMINATION:    General appearance: Well appearing, in no acute distress, alert and oriented x3.  Psych:  Mood and affect appropriate. Tearful.  Skin: Skin color, texture, turgor normal, no rashes or lesions, in both upper and lower body.  Head/face:  Atraumatic, normocephalic. No palpable lymph nodes  Back: Straight leg raising in the sitting position is negative to radicular pain bilaterally. Limited ROM with pain on extension > flexion. Positive facet loading bilaterally. No tenderness to palpation over bilateral SIJ.   Extremities: Right knee: limited ROM with pain on extension and flexion. TTP over medial and lateral joint line. No deformities, edema, or skin discoloration. Good capillary refill.  Musculoskeletal: Shoulder, Hip, Knee provocative maneuvers are negative. No tenderness to palpation over bilateral piriformis or GTB.  Bilateral upper and lower extremity strength is normal and symmetric.  No atrophy or tone abnormalities are noted.   Neuro: Negative Sapp's.No loss of sensation is noted.  Gait: Antalgic-ambulates with straight cane    ASSESSMENT: 74 y.o. year old female with low back and hip pain, consistent with the followin. Primary osteoarthritis of right knee  X-ray Knee Ortho Bilateral with Flexion      2. Lumbosacral radiculopathy  EMG W/ ULTRASOUND AND NERVE CONDUCTION TEST 2 Extremities            PLAN:  We discussed with the patient the assessment and recommendations. The following is the plan we agreed on:  She is s/p Right Knee CSI with no relief  Update bilateral knee X-rays  Continue MS Contin 30 mg BID PRN severe pain.  Continue Lyrica 150 mg BID. Last Cr 0.96 and eGFR 62 from 24   Ordered EMG with ultrasound and NCS  Patient is aware that I am unable to change her medication.  UDS 2023.   Patient will contact Dr Ward re: pain pump evaluation  Continue f/u with NSGY  Continue f/u  with Orthopedics  Patient Defers SCS Trial at this time  RTC for medication management visit.     Ayse Moreno NP   07/24/2024    I spent a total of 47 minutes on the day of the visit.  This includes face to face time and non-face to face time preparing to see the patient (eg, review of tests), obtaining and/or reviewing separately obtained history, documenting clinical information in the electronic or other health record, independently interpreting results and communicating results to the patient/family/caregiver, or care coordinator.

## 2024-07-30 ENCOUNTER — CLINICAL SUPPORT (OUTPATIENT)
Dept: REHABILITATION | Facility: HOSPITAL | Age: 74
End: 2024-07-30
Payer: MEDICARE

## 2024-07-30 DIAGNOSIS — M25.69 DECREASED ROM OF TRUNK AND BACK: Primary | ICD-10-CM

## 2024-07-30 PROCEDURE — 97140 MANUAL THERAPY 1/> REGIONS: CPT | Mod: CQ

## 2024-07-30 PROCEDURE — 97110 THERAPEUTIC EXERCISES: CPT | Mod: CQ

## 2024-07-30 NOTE — PROGRESS NOTES
"  [facility]      Name: Chantal Ridley  Clinic Number: [id number]    Therapy Diagnosis:   Encounter Diagnosis   Name Primary?    Decreased ROM of trunk and back Yes         Physician: Jeronimo Rodrigez MD    Visit Date: 2024  Physician Orders: PT Eval and Treat  Medical Diagnosis from Referral:   Diagnosis   M47.816 (ICD-10-CM) - Lumbar spondylosis      Evaluation Date: 2024  Authorization Period Expiration: 24  Plan of Care Expiration: 2024 sent 24-24  Reassessment Due:24 ( A reassessment was performed by Paola Temple PT this visit 24)  Visit # / Visits authorized:  (lumbar roll on MedX- used precor for this visit due to flare up and ease of getting on and off machine)  MedX testing visit 2     Time In: 12:45 PM    Time Out:  2:00 PM  Total Billable Time: 55 min    INSURANCE and OUTCOMES: Fee for Service with FOTO Outcomes 2/3     Precautions: standard, HTN, and diabetes     Pattern of pain determined: 1PEN    Subjective   Chantal reports that her primary c/o pain is related to her R thigh/knee today. States that her actual back pain is only minimal presently. She reports no relief from previous injections or pain med interventions.  States that she will be having another knee x-ray next week.    Patient reports tolerating previous visit: soreness  Patient reports their pain to be 8/10 on a 0-10 scale with 0 being no pain and 10 being the worst pain imaginable.  Pain Location: low back, right hip, R LE  Social History:  lives alone in shotgun home, has hand rails and 3 steps into house  Occupation: retired  Leisure: visit with friends   Pt goals: " decrease pain"     Objective     Baseline IM Testing Results:   Date of testin24  ROM 12-30 deg   Max Peak Torque 92    Min Peak Torque 69    Flex/Ext Ratio 1.3:1   % below normative data 23     Mid IM Testing Results:   Date of testin24  ROM  deg   Max Peak Torque 117 "   Min Peak Torque 75   Flex/Ext Ratio 1.5:1   % below normative data 9 with 11% gain in strength       MOVEMENT LOSS - Lumbar    Norms ROM Loss Initial 6/25/24 7/30/24   Flexion Fingers touch toes, sacral angle >/= 70 deg, uniform spinal curvature, posterior weight shift  moderate loss and major loss Mod loss Min loss   Extension ASIS surpasses toes, spine of scapulae surpasses heels, uniform spinal curve major loss, inc pain LB/RLE Major loss major   Side glide Right   moderate loss Mod loss Mod loss   Side glide Left   moderate loss Mod loss Mod loss   Rotation Right PT observes contralateral shoulder major loss Mod loss Mod loss   Rotation Left PT observes contralateral shoulder major loss Mod loss Mod loss      Right hip: pain with flex/abd/int rot/and ext rotation  Difficulty putting socks and shoes on  Unable to SLS without support    OUTCOMES SELECTION:   Intake Outcome Measure for FOTO Lumbar Survey     Therapist reviewed FOTO scores for Chantal Ridley on 4/18/2024.   FOTO documents entered into [employer] - see Media section.     Intake Score: 13% functional ability  visit 6 9%  Goal Score: 30% functional ability         Treatment    Chantal received the treatments listed below:      Chantal received neuromuscular education  to isolate and engage spinal stabilization musculature correctly for motor control and coordination to aid in function and posture for 00 minutes on the Medical Medx Machine.  Patient performed MedX dynamic exercise with emphasis on spinal muscular control using pacer throughout  active range of motion. Therapist assisted patient in achieving optimal exertion for neural reeducation and endurance training by using the  Nandini Exertion Rating scale, by instructing the patient to aim for mid range of exertion, performing 15-20 repetitions, slowly, correctly,and safely.        therapeutic exercises to develop strength, endurance, ROM, flexibility, posture, and core stabilization for 45  minutes  including:    LTR 10x  BKTC c/ ball x10  Bridging x 15 (minimal lift off) c/ RTB   BKFO c/ RTB 10x   Hooklying hip add isometric (Ball squeeze) x 20  SOC x 10  seated trunk flexion with swiss ball x 10  + seated thoracic trunk extension w/1/2 foam roll x 10  Precor lumbar extension 55# 20 reps, RPE = 3/10.    Not performed today  PPT 10x--NP  TrA activation with T-ball x 10- NP  HSS 2x30 --NP  Piriformis stretch 2x 30--NP  Standing abd /ext 10x--NP  LAQ 2# 20x R--NP  Seated hip flex 2# 10 reps--NP  Seated knee extension 20x 2# R--NP      Peripheral muscle strengthening which included 1 set of 15-20 repetitions at a slow, controlled 10-13 second per rep pace focused on strengthening supporting musculature for improved body mechanics and functional mobility.  Pt and therapist focused on proper form during treatment to ensure optimal strengthening of each targeted muscle group.  She was able to complete full circuit of peripheral machines today with increased time.    Manual techniques x 10 minutes to include: STM and stick massage to R quad, Long axis distraction R LE.    cold pack for 10 minutes to LB/ R thigh and knee    Home Exercises Provided and Patient Education Provided   Home exercises include:LTR, bridge, laq, ball squeeze, walking  Cardio program:5/16/24 and encouraged walking in home 5/30/24  Lifting education date:TBD [employer] Discharge handout (date given):  Equipment at home/gym membership: no    Education provided:   -  cues w/exs  - MedX performance  - Precor ex performance    Written Home Exercises Provided: Patient instructed to cont prior HEP.  Exercises were reviewed and Chantal was able to demonstrate them prior to the end of the session.  Chantal demonstrated fair understanding of the education provided.     See EMR under Patient Instructions for exercises provided 5/30/24    Assessment   Chantal returns with continued c/o severe  R thigh and knee pain. Back pain is only minimal ( chief c/o R  thigh/knee pain).  She is being followed by pain medicine team for these symptoms but unfortunately, has had no lasting relief with interventions yet. Continues to present with an antalgic gait on R.  Treatment continued with flexibility, strengthening and neuromuscular reeducation ex's. She is guarded with mobility and ex's due to pain. Added additional manual techniques to include: STM and stick massage to R thigh/, long axis distraction which provides only Slight/temporary decrease in R leg symptoms.  Pt continued with back strengthening using Precor machine with 55#'s completing 20 reps with a RPE = 3/10.  She performed peripheral strengthening ex's including resuming leg extension machine which she reports as being helpful to reduce symptoms (leg curl, chest press not performed). Will continue per HB protocol and patient tolerance. She reports slight decrease in pain level to moderate post session.    Anticipated Barriers for therapy: transportation/pain level   Pt's spiritual, cultural and educational needs considered and pt agreeable to plan of care and goals as stated below:       GOALS: Pt is in agreement with the following goals.     Short term goals:  6 weeks or 10 visits   - Pt will demonstrate increased lumbar MedX ROM by at least 3 degrees from the initial ROM value with improvements noted in functional ROM and ability to perform ADLs. MET  - Pt will demonstrate increased MedX average isometric strength value by 20% from initial test resulting in improved ability to perform bending, lifting, and carrying activities safely, confidently. Appropriate and Ongoing  - Pt will report a reduction in worst pain score by 1-2 points for improved tolerance for standing. Appropriate and Ongoing  - Pt able to perform HEP correctly with minimal cueing or supervision from therapist to encourage independent management of symptoms. Appropriate and Ongoing     Long term goals: 10 weeks or 20 visits   - Pt will  demonstrate increased lumbar MedX ROM by at least 9 degrees from initial ROM value, resulting in improved ability to perform functional forward bending while standing and sitting.MET  - Pt will demonstrate increased MedX average isometric strength value by 40% from initial test resulting in improved ability to perform bending, lifting, and carrying activities safely and confidently. Appropriate and Ongoing  - Pt to demonstrate ability to independently control and reduce their pain through posture positioning and mechanical movements throughout a typical day. Appropriate and Ongoing  - Pt will demonstrate reduced pain and improved functional outcomes as reported on the FOTO by reaching an intake score of >/= 30% functional ability in order to demonstrate subjective improvement in patient's condition. . Appropriate and Ongoing  - Pt will demonstrate independence with the HEP at discharge. Appropriate and Ongoing  - Pt(patient goal)will be able to walk without AD > 5 minutes without sitting  Appropriate and Ongoing    Plan   Continue with established Plan of Care towards established PT goals.     Therapist: Alfa Betancourt, PTA  7/30/2024

## 2024-08-01 ENCOUNTER — TELEPHONE (OUTPATIENT)
Dept: PODIATRY | Facility: CLINIC | Age: 74
End: 2024-08-01
Payer: MEDICARE

## 2024-08-01 ENCOUNTER — CLINICAL SUPPORT (OUTPATIENT)
Dept: REHABILITATION | Facility: HOSPITAL | Age: 74
End: 2024-08-01
Payer: MEDICARE

## 2024-08-01 DIAGNOSIS — M79.651 RIGHT THIGH PAIN: Primary | ICD-10-CM

## 2024-08-01 PROCEDURE — 97110 THERAPEUTIC EXERCISES: CPT

## 2024-08-01 PROCEDURE — 97112 NEUROMUSCULAR REEDUCATION: CPT

## 2024-08-01 NOTE — PROGRESS NOTES
" [facility] Treatment      Name: Chantal Ridley  Clinic Number: [id number]    Therapy Diagnosis:   No diagnosis found.        Physician: Jeronimo Rodrigez MD    Visit Date: 2024  Physician Orders: PT Eval and Treat  Medical Diagnosis from Referral:   Diagnosis   M47.816 (ICD-10-CM) - Lumbar spondylosis      Evaluation Date: 2024  Authorization Period Expiration: 24  Plan of Care Expiration: 2024 sent 24-24  Reassessment Due:24  Visit # / Visits authorized:  (lumbar roll on MedX- used precor for this visit due to flare up and ease of getting on and off machine)  MedX testing visit 2     Time In: 225  Time Out: 315  Total Billable Time: 50 min  30 min 1on1  INSURANCE and OUTCOMES: Fee for Service with FOTO Outcomes 2/3     Precautions: standard, HTN, and diabetes     Pattern of pain determined: 1PEN    Subjective   Chantal reports her pain is intense in the r anterior thigh/groin region.  Pt ambulates mod deviations and states she is never out of pain.  Pt expresses frustration with lack of improvement in her pain symptoms    Patient reports tolerating previous visit: soreness  Patient reports their pain to be 8/10 on a 0-10 scale with 0 being no pain and 10 being the worst pain imaginable.  Pain Location: low back, right hip, R LE  Social History:  lives alone in shotgun home, has hand rails and 3 steps into house  Occupation: retired  Leisure: visit with friends   Pt goals: " decrease pain"     Objective     Baseline IM Testing Results:   Date of testin24  ROM 12-30 deg   Max Peak Torque 92    Min Peak Torque 69    Flex/Ext Ratio 1.3:1   % below normative data 23     Mid IM Testing Results:   Date of testin24  ROM  deg   Max Peak Torque 117   Min Peak Torque 75   Flex/Ext Ratio 1.5:1   % below normative data 9 with 11% gain in strength       MOVEMENT LOSS - Lumbar    Norms ROM Loss Initial 24   Flexion Fingers touch " toes, sacral angle >/= 70 deg, uniform spinal curvature, posterior weight shift  moderate loss and major loss Mod loss Min loss   Extension ASIS surpasses toes, spine of scapulae surpasses heels, uniform spinal curve major loss, inc pain LB/RLE Major loss major   Side glide Right   moderate loss Mod loss Mod loss   Side glide Left   moderate loss Mod loss Mod loss   Rotation Right PT observes contralateral shoulder major loss Mod loss Mod loss   Rotation Left PT observes contralateral shoulder major loss Mod loss Mod loss      Right hip: pain with flex/abd/int rot/and ext rotation  Difficulty putting socks and shoes on  Unable to SLS without support    OUTCOMES SELECTION:   Intake Outcome Measure for FOTO Lumbar Survey     Therapist reviewed FOTO scores for Chantal Ridley on 2024.   FOTO documents entered into [employer] - see Media section.     Intake Score: 13% functional ability  visit 6 9%  Goal Score: 30% functional ability         Treatment    Chantal received the treatments listed below:       therapeutic exercises to develop strength, endurance, ROM, flexibility, posture, and core stabilization for 50  minutes includin/1/2024     3:09 PM   [facility]   Visit Number 18   VAS Pain Rating 8   Time 10   Lumbar Stretches - Slouch Overcorrection 10   Flexion in Lying 10   Flexion in Sitting 10   Lumbar Weight 55 lbs   Repetitions 20   Rating of Perceived Exertion 3   Ice - Z Lie (in min.) 5       LTR 10x  BKTC c/ ball x10  Bridging x 15 (minimal lift off) c/ RTB   BKFO c/ RTB 10x   Hooklying hip add isometric (Ball squeeze) x 20  SOC x 10  seated trunk flexion with swiss ball x 10  + seated thoracic trunk extension w/1/2 foam roll x 10  Precor lumbar extension 55# 20 reps, RPE = 3/10.    Not performed today  PPT 10x--NP  TrA activation with T-ball x 10- NP  HSS 2x30 --NP  Piriformis stretch 2x 30--NP  Standing abd /ext 10x--NP  LAQ 2# 20x R--NP  Seated hip flex 2# 10 reps--NP  Seated knee  extension 20x 2# R--NP      Peripheral muscle strengthening which included 1 set of 15-20 repetitions at a slow, controlled 10-13 second per rep pace focused on strengthening supporting musculature for improved body mechanics and functional mobility.  Pt and therapist focused on proper form during treatment to ensure optimal strengthening of each targeted muscle group.  She was able to complete full circuit of peripheral machines today with increased time.    Manual techniques x 10 minutes to include: STM and stick massage to R quad, Long axis distraction R LE.    cold pack for 10 minutes to LB/ R thigh and knee    Home Exercises Provided and Patient Education Provided   Home exercises include:LTR, bridge, laq, ball squeeze, walking  Cardio program:5/16/24 and encouraged walking in home 5/30/24  Lifting education date:D [employer] Discharge handout (date given):  Equipment at home/gym membership: no    Education provided:   -  cues w/exs  - MedX performance  - Precor ex performance    Written Home Exercises Provided: Patient instructed to cont prior HEP.  Exercises were reviewed and Chantal was able to demonstrate them prior to the end of the session.  Chantal demonstrated fair understanding of the education provided.     See EMR under Patient Instructions for exercises provided 5/30/24    Assessment   Chantal returns with continued c/o severe  R thigh and knee pain. Continues to present with an antalgic gait on R.  Pt reports pain is continous and that she never has relief.Treatment continued with flexibility, strengthening and neuromuscular reeducation ex's. She is guarded with mobility and ex's due to pain. Continue with manual techniques to include: STM and stick massage to R thigh/, long axis distraction which provides only Slight/temporary decrease in R leg symptoms.  Pt continued with back strengthening using Precor machine with 55#'s completing 20 reps with a RPE = 3/10.  She performed peripheral  strengthening ex's including resuming leg extension machine which she reports as being helpful to reduce symptoms (leg curl, chest press not performed). Will continue per HB protocol and patient tolerance. She reports slight decrease in pain level to moderate post session.    Anticipated Barriers for therapy: transportation/pain level   Pt's spiritual, cultural and educational needs considered and pt agreeable to plan of care and goals as stated below:       GOALS: Pt is in agreement with the following goals.     Short term goals:  6 weeks or 10 visits   - Pt will demonstrate increased lumbar MedX ROM by at least 3 degrees from the initial ROM value with improvements noted in functional ROM and ability to perform ADLs. MET  - Pt will demonstrate increased MedX average isometric strength value by 20% from initial test resulting in improved ability to perform bending, lifting, and carrying activities safely, confidently. Appropriate and Ongoing  - Pt will report a reduction in worst pain score by 1-2 points for improved tolerance for standing. Appropriate and Ongoing  - Pt able to perform HEP correctly with minimal cueing or supervision from therapist to encourage independent management of symptoms. Appropriate and Ongoing     Long term goals: 10 weeks or 20 visits   - Pt will demonstrate increased lumbar MedX ROM by at least 9 degrees from initial ROM value, resulting in improved ability to perform functional forward bending while standing and sitting.MET  - Pt will demonstrate increased MedX average isometric strength value by 40% from initial test resulting in improved ability to perform bending, lifting, and carrying activities safely and confidently. Appropriate and Ongoing  - Pt to demonstrate ability to independently control and reduce their pain through posture positioning and mechanical movements throughout a typical day. Appropriate and Ongoing  - Pt will demonstrate reduced pain and improved functional  outcomes as reported on the FOTO by reaching an intake score of >/= 30% functional ability in order to demonstrate subjective improvement in patient's condition. . Appropriate and Ongoing  - Pt will demonstrate independence with the HEP at discharge. Appropriate and Ongoing  - Pt(patient goal)will be able to walk without AD > 5 minutes without sitting  Appropriate and Ongoing    Plan   Continue with established Plan of Care towards established PT goals.     Therapist: Paola Temple, PT  8/1/2024

## 2024-08-01 NOTE — TELEPHONE ENCOUNTER
Faxed over requested evaluation documents to [facility] for patient Rx to be filled as prescribed.

## 2024-08-06 ENCOUNTER — CLINICAL SUPPORT (OUTPATIENT)
Dept: REHABILITATION | Facility: HOSPITAL | Age: 74
End: 2024-08-06
Payer: MEDICARE

## 2024-08-06 ENCOUNTER — HOSPITAL ENCOUNTER (OUTPATIENT)
Dept: RADIOLOGY | Facility: HOSPITAL | Age: 74
Discharge: HOME | End: 2024-08-06
Payer: MEDICARE

## 2024-08-06 DIAGNOSIS — M25.69 DECREASED ROM OF TRUNK AND BACK: Primary | ICD-10-CM

## 2024-08-06 DIAGNOSIS — M17.11 PRIMARY OSTEOARTHRITIS OF RIGHT KNEE: ICD-10-CM

## 2024-08-06 PROCEDURE — 73564 X-RAY EXAM KNEE 4 OR MORE: CPT | Mod: TC,50

## 2024-08-06 PROCEDURE — 73564 X-RAY EXAM KNEE 4 OR MORE: CPT | Mod: 26,50,, | Performed by: INTERNAL MEDICINE

## 2024-08-06 PROCEDURE — 97112 NEUROMUSCULAR REEDUCATION: CPT

## 2024-08-06 PROCEDURE — 97110 THERAPEUTIC EXERCISES: CPT

## 2024-08-08 ENCOUNTER — CLINICAL SUPPORT (OUTPATIENT)
Dept: REHABILITATION | Facility: HOSPITAL | Age: 74
End: 2024-08-08
Payer: MEDICARE

## 2024-08-08 DIAGNOSIS — M25.69 DECREASED ROM OF TRUNK AND BACK: Primary | ICD-10-CM

## 2024-08-08 PROCEDURE — 97110 THERAPEUTIC EXERCISES: CPT

## 2024-08-13 ENCOUNTER — OFFICE VISIT (OUTPATIENT)
Dept: SPINE | Facility: CLINIC | Age: 74
End: 2024-08-13
Attending: ANESTHESIOLOGY
Payer: MEDICARE

## 2024-08-13 VITALS
HEIGHT: 61 IN | RESPIRATION RATE: 18 BRPM | WEIGHT: 142.44 LBS | HEART RATE: 56 BPM | SYSTOLIC BLOOD PRESSURE: 160 MMHG | DIASTOLIC BLOOD PRESSURE: 73 MMHG | BODY MASS INDEX: 26.89 KG/M2

## 2024-08-13 DIAGNOSIS — M54.16 LUMBAR RADICULOPATHY: ICD-10-CM

## 2024-08-13 DIAGNOSIS — M25.551 RIGHT HIP PAIN: ICD-10-CM

## 2024-08-13 DIAGNOSIS — M47.26 OSTEOARTHRITIS OF SPINE WITH RADICULOPATHY, LUMBAR REGION: ICD-10-CM

## 2024-08-13 DIAGNOSIS — M71.38 CYST OF LUMBAR FACET JOINT: ICD-10-CM

## 2024-08-13 DIAGNOSIS — M54.17 LUMBOSACRAL RADICULOPATHY: ICD-10-CM

## 2024-08-13 DIAGNOSIS — G89.4 CHRONIC PAIN SYNDROME: Primary | ICD-10-CM

## 2024-08-13 PROCEDURE — 99999 PR PBB SHADOW E&M-EST. PATIENT-LVL IV: CPT | Mod: PBBFAC,,, | Performed by: ANESTHESIOLOGY

## 2024-08-13 RX ORDER — OXYCODONE AND ACETAMINOPHEN 10; 325 MG/1; MG/1
1 TABLET ORAL EVERY 12 HOURS PRN
Qty: 60 TABLET | Refills: 0 | Status: SHIPPED | OUTPATIENT
Start: 2024-08-13 | End: 2024-09-12

## 2024-08-13 RX ORDER — PREGABALIN 150 MG/1
150 CAPSULE ORAL 2 TIMES DAILY
Qty: 60 CAPSULE | Refills: 12 | Status: SHIPPED | OUTPATIENT
Start: 2024-08-13 | End: 2025-09-07

## 2024-08-13 RX ORDER — NALOXONE HYDROCHLORIDE 4 MG/.1ML
SPRAY NASAL
Qty: 2 EACH | Refills: 11 | Status: SHIPPED | OUTPATIENT
Start: 2024-08-13

## 2024-08-13 NOTE — PROGRESS NOTES
Chronic patient Established Note (Follow up visit)      SUBJECTIVE:  Interval History 8/13/2024  Patient returns to clinic for chronic low back pain that radiates to right leg and right knee pain.  Patient reported worsening back pain due to running out of MS Contin, which did not helpful.  She reported that oxycodone was more effective and Lyrica also has helped.  We also ordered EMG but patient has not been scheduled.  On last visit, we also recommended patient to contact Dr. Ward re pain pump eval, patient called but was not able to schedule.  Pain today is 10/10.          Interval History 7/24/2024:  Chantal Ridley returns to clinic for follow-up of chronic pain and after right knee steroid injection on 7/11/2024. She reports no relief and actually feels as though the knee hurts more. She states her main source of pain is to the right anterior thigh. She states the pain feels tight and as if a heavy object is sitting on her thigh. The pain is worse with waling and standing. She is takes Morphine 30 mg BID PRN without relief. She states she needs more medication than everyone else because she has always had a high tolerance to pain medication. She denies any perceived side effects. She is currently participating in the [facility] Program. She is establishing care with Dr Mays on 9/5/2024 and following up with Dr Rodrigez on 10/1/2024. She has not been able to establish care with Dr Ward to discuss pain pump. She is very frustrated with her pain and how it limits her ability to perform her ADLs and care for herself. She has had epidurals in the past with limited relief and she is not interested in additional epidurals at this time. Her son recently came to [facility] to stay with her for a few months. She denies new onset fever/night sweats, urinary incontinence, bowel incontinence, significant weight changes, significant motor weakness or changes, or loss of sensations. She denies recent falls or trauma. Her  pain today is 10/10.     Interval History 7/11/2024:  Chantal Ridley reutrns to clinic for follow-up of chronic pain and right knee pain. She states her right knee has been giving her problems with walking and standing. The pain is aching in nature. She would like a steroid injection today.  She would also like to discuss switching her medications with Dr Valdez. She is also interested in a referral to Dr Ward's office to be evaluated for a pain pump.  The patient denies fever/night sweats, urinary incontinence, bowel incontinence, significant weight changes, significant motor weakness or changes, or loss of sensations. Her pain today is 10/10    Interval History 7/1/2024:  Chantal Ridley returns to clinic for follow-up of chronic pain. She is upset today because her appointment with Dr Valdez to discuss pain medication management was rescheduled and she is now seeing the NP.  She reports that the Dilaudid that was previously prescribed, 4 mg TID PRN is not helping and she has not been taking it.  She states she lives in constant pain and can barely walk across her house to make herself food or a cup of coffee. She does not want to proceed with previously discussed SCS trial. She has researched a pain pump through [employer] and wishes to proceed with this procedure.  She would like another referral to Ortho to discuss her hip pain as Dr Faye is no longer performing surgeries.  She would also like a referral to see another NSGY provider although she is not interested in surgery at this time.  She would also like to switch pain providers at [facility] to Dr Rivers at [place] as this is a more convenient location and she states her podiatrist originally wanted her to see Dr Rivers. She had some leftover Percocet and has taken this with some relief.  She would like to switch to Percocet. She also takes Lyrica with some relief and would like to see if she can increase this dose.  She  continues the Healthy Back Program with good relief. The patient denies fever/night sweats, urinary incontinence, bowel incontinence, significant weight changes, significant motor weakness or changes, or loss of sensations. Her pain today is 10/10.     Interval History 5/29/2024.   She returns for follow-up.  She said she meets understood and took both oxycodone and Dilaudid.  She does not think the oxycodone helps at all.  She thinks the Dilaudid helps a little.  She has not been using any over-the-counter medications.  She continues on pregabalin.  She has severe pain that is interfering with her activities of daily living to a great extent.  Says she has difficulties with cooking and hygiene among other things.  Denied having any new bowel or bladder symptomatology.  Pain is mostly around the right hip and right thigh.  She does have lower back pain as well as some radicular symptoms down the right lower extremities with tingling in the great toe.      Interval History 4/9/2024:  The patient is here for follow up of back pain. The pain continues to radiate down the back of the right leg. She had no benefit with HERACLIO in the past. Since previous encounter, she did f/u with Dr. Rodrigez and she did not wish to pursue surgical options at this time. He referred her to Ortho to have her right hip checked. She saw Dr. Faye who does not think that her hip is the issue. He believes that her primary cause is her back, which I agree with. She was given a Toradol shot last month by podiatry which she says did not help her at all for any area of pain. She is starting [facility] on 4/18/24. She is taking Percocet 10/325 mg BID which she says does not help very much. She is asking about ITP opioid pain medications. Her pain today is 10/10.    Interval History 2/19/2024:  The patient is here for follow up of right sided back and leg pain. She is s/p right L3/4 and L4/5 TF HERACLIO with no relief, not even short term. She actually feels  like it worsened her pain. Leg pain is greater than back pain. She continues to have right sided back pain with radiation down the front of the right leg. She has associated numbness and weakness. She does have some symptoms on the left, but the right side is the worse. She did see Dr. Rodrigez last year who discussed surgery if epidural did not help. [place] She continues to take Percocet BID with mild benefit. She stopped Gabapentin due to minimal benefit. She is requesting Naproxen as she feels like it was helpful in the past. Her pain today is 10/10.    Interval History 1/16/2024:  Chantal Ridley presents to the clinic for a follow-up appointment for hip pain. She is s/p right hip joint injection on 12/18/2023. She reports relief for 2 weeks. Then, her pain returned to baseline. She continues to report low back pain that radiates into the anterolateral aspect of her right thigh to her knee. She denies any left leg pain. She does have right hip pain. Her pain is worse with standing and walking. She also reports pain with moving from sitting to standing. She is currently taking Percocet as needed with benefit. She denies any adverse effects. She denies any other health changes. Her pain today is 10/10.    HPI  A former patient of Dr. Garland who was on Percocet for chronic pain.  She was given 7.5 to use twice a day.  They had recommended interventions but she declined.  She is interested in interventions if they would help.  She has chronic right lower back pain that radiates to the right groin and thigh.  It also extends this down to the foot anterior and dorsally.  There is sometimes tingling but no numbness.  She does not appreciate weakness.  She is stiff when she gets up and takes her a while to get moving.  There is no imaging of her hip but she thinks she had 1 at [facility].  She had therapy in the remote past and has not been following with the  exercises.  She is interested in getting back into therapy.    Pain Disability Index Review:      8/13/2024     1:58 PM 7/24/2024     2:03 PM 7/11/2024     2:56 PM   Last 3 PDI Scores   Pain Disability Index (PDI) 50 66 70       Pain Medications:  Percocet-not helpful  Hydromorphone- not helpful  MS Contin - not helpful    Opioid Contract: yes     report:  Reviewed and consistent with medication use as prescribed.    Pain Procedures:   12/18/2023- Right hip joint injection  2/5/24 Right L3/4 and L4/5 TF HERACLIO- no relief  5/13/2024 - Caudal HERACLIO - no relief  7/11/2024 - Right Knee CSI (Eissa) - no relief    Physical Therapy/Home Exercise: yes, [facility] currently    Imaging:     XR LUMBAR SPINE AP AND LAT WITH FLEX/EXT     FINDINGS:  DJD with bridging osteophytosis.  The L2/L3 and the L5/S1 disc spaces are significantly narrowed.  Narrowed disc spaces also noted between the 12 and L2 vertebral segment.  No fracture, or dislocation.  There is a few mm L1/L2 and L2/L3 retrolisthesis.  No bone destruction identified     Impression:     See above        Electronically signed by:Alfa Fiore MD  Date:                                            07/17/2024  Time:                                           10:19    MRI LUMBAR SPINE WITHOUT CONTRAST     FINDINGS:  Alignment: Leftward curvature of the lumbar spine.  Reversal of curvature thoracolumbar level     Vertebrae: No marrow replacing infiltrative process.  No fracture.     Discs: Disc space narrowing T12-L1 L1-L2 L2-L3 with discogenic degenerative changes at the endplates.     Cord: Normal contour and signal.  Conus terminates at L1-L2     Degenerative findings:     T12-L1: Circumferential disc bulge, ligamentum flavum buckling, and bilateral facet arthropathy.  No spinal canal stenosis or neural foraminal narrowing.     L1-L2: Circumferential disc bulge, ligamentum flavum buckling, and bilateral facet arthropathy.  Findings result in mild spinal canal stenosis.   Partial effacement of the inferior perineural fat adjacent to the bilateral exiting nerve roots, consistent with mild bilateral neural foraminal narrowing.     L2-L3: Circumferential disc bulge, ligamentum flavum buckling, and bilateral facet arthropathy.  Findings result in moderate spinal canal stenosis.  Complete effacement of the right perineural fat consistent with severe right neural foraminal narrowing.  Partial effacement of the anterior fat adjacent to the left exiting nerve root, consistent with mild left neural foraminal narrowing.     L3-L4: Circumferential disc bulge, ligamentum flavum buckling, and bilateral facet arthropathy.  Posterior projecting synovial cyst arising from the right facet.  Severe spinal canal stenosis.  Near complete effacement of the bilateral perineural fat, consistent with moderate bilateral neural foraminal narrowing.     L4-L5: Circumferential disc bulge, ligamentum flavum buckling, and bilateral facet arthropathy.  Moderate-severe spinal canal stenosis.     L5-S1: Circumferential disc bulge, ligamentum flavum buckling, and bilateral facet arthropathy.  No spinal canal stenosis.  Near complete effacement of the left perineural fat, consistent with moderate left neural foraminal narrowing.  Partial effacement of the perineural fat along the inferior aspect of the exiting right nerve root, consistent with mild right neural foraminal narrowing.     Paraspinal muscles & soft tissues: Unremarkable.     Impression:     1. Degenerative changes of the lumbar spine most pronounced at L3-L4 with severe spinal canal stenosis and moderate bilateral neural foraminal narrowing.  2. Additional findings, as above.     Electronically signed by resident: Franchesca Pierre  Date:                                            06/20/2024  Time:                                           16:38     Electronically signed by:Aj Benitez MD  Date:                                            06/20/2024  Time:                                            16:54    Xray Hips 11/21/2023:  CLINICAL HISTORY:  Pain in right hip     FINDINGS:  Two views right hip: There is severe DJD and impingement change.  No fracture dislocation bone destruction seen.      Allergies:   Review of patient's allergies indicates:   Allergen Reactions    Ace inhibitors Anaphylaxis, Itching and Swelling    Amlodipine Swelling    Amoxicillin Anaphylaxis    Erythromycin Swelling and Hives     Tongue swelling    Penicillins Anaphylaxis    Losartan Hives     Headaches. Patient is currently taking Losartan for high b/p and states they are feeling fine.    Tramadol Itching    Olmesartan Rash     Other reaction(s): Skin Rashes / Eruption of skin, Benicar       Current Medications:   Current Outpatient Medications   Medication Sig Dispense Refill    estrogen, conjugated,-medroxyprogesterone 0.625-2.5mg (PREMPRO) 0.625-2.5 mg per tablet Take 1 tablet by mouth once daily.      estrogen, conjugated,-medroxyprogesterone 0.625-2.5mg (PREMPRO) 0.625-2.5 mg per tablet Take 1 tablet by mouth once daily. 30 tablet 12    fluconazole (DIFLUCAN) 150 MG Tab       irbesartan (AVAPRO) 150 MG tablet Take 150 mg by mouth every evening.      irbesartan (AVAPRO) 300 MG tablet Take 300 mg by mouth.      LIDOcaine (LIDODERM) 5 % Place 1 patch onto the skin once daily. Remove & Discard patch within 12 hours or as directed by MD 30 patch 6    LIDOcaine-prilocaine (EMLA) cream Apply topically as needed.      sitagliptan-metformin (JANUMET)  mg per tablet Take 1 tablet by mouth 2 (two) times daily with meals.      tiZANidine (ZANAFLEX) 4 MG tablet Take 4 mg by mouth every 8 (eight) hours.      amitriptyline (ELAVIL) 10 MG tablet Take 1 tablet (10 mg total) by mouth nightly as needed for Insomnia. 30 tablet 2    diazePAM (VALIUM) 5 MG tablet Take 1 tablet (5 mg total) by mouth once. Take medication 30 minutes prior to procedure  for 1 dose 1 tablet 0    diclofenac sodium  (SOLARAZE) 3 % gel Apply topically 2 (two) times daily. (Patient not taking: Reported on 8/13/2024)      hydrOXYzine pamoate (VISTARIL) 25 MG Cap Take 25 mg by mouth daily as needed. (Patient not taking: Reported on 8/13/2024)      morphine (MS CONTIN) 30 MG 12 hr tablet Take 1 tablet (30 mg total) by mouth every 12 (twelve) hours as needed for Pain (for severe pain). (Patient not taking: Reported on 8/13/2024) 60 tablet 0    phenyleph-min oil-petrolatum 0.25-14-74.9 % Oint Place 1 Application rectally. (Patient not taking: Reported on 8/13/2024)      phenylephrine-cocoa butter 0.25-88.44 % Supp suppository Place 1 suppository rectally. (Patient not taking: Reported on 8/13/2024)      pregabalin (LYRICA) 150 MG capsule Take 1 capsule (150 mg total) by mouth 2 (two) times daily. 60 capsule 0     No current facility-administered medications for this visit.     Facility-Administered Medications Ordered in Other Visits   Medication Dose Route Frequency Provider Last Rate Last Admin    0.9%  NaCl infusion   Intravenous Continuous Chris Farmer MD           REVIEW OF SYSTEMS:    GENERAL:  No weight loss, malaise or fevers.  HEENT:  Negative for frequent or significant headaches.  NECK:  Negative for lumps, goiter, pain and significant neck swelling.  RESPIRATORY:  Negative for cough, wheezing or shortness of breath.  CARDIOVASCULAR:  Negative for chest pain, leg swelling or palpitations. HTN  GI:  Negative for abdominal discomfort, blood in stools or black stools or change in bowel habits.  MUSCULOSKELETAL:  See HPI.  SKIN:  Negative for lesions, rash, and itching.  PSYCH:  Negative for sleep disturbance, mood disorder and recent psychosocial stressors.  ENDO: Diabetes  HEMATOLOGY/LYMPHOLOGY:  Negative for prolonged bleeding, bruising easily or swollen nodes.  NEURO:   No history of headaches, syncope, paralysis, seizures or tremors.  All other reviewed and negative other than HPI.    Past Medical History:  Past Medical  History:   Diagnosis Date    Diabetes mellitus     Fibromyalgia     Hx of degenerative disc disease     neck & back    Hypertension        Past Surgical History:  Past Surgical History:   Procedure Laterality Date    CATARACT EXTRACTION W/  INTRAOCULAR LENS IMPLANT  2016    EPIDURAL STEROID INJECTION N/A 2024    Procedure: CAUDAL HERACLIO WITH CATH;  Surgeon: Ike Valdez MD;  Location: [facility];  Service: Pain Management;  Laterality: N/A;  [phone]  2 WK F/U MICHAEL    INJECTION OF JOINT Right 2023    Procedure: INJECTION, JOINT RIGHT HIP;  Surgeon: Ike Valdez MD;  Location: [facility];  Service: Pain Management;  Laterality: Right;  [phone]    TRANSFORAMINAL EPIDURAL INJECTION OF STEROID Right 2024    Procedure: LUMBAR TRANSFORAMINAL RIGHT L3/4 AND L4/5;  Surgeon: Ike Valdez MD;  Location: [facility];  Service: Pain Management;  Laterality: Right;  [phone]  2 WK F/U SIGIFREDO    TUBAL LIGATION         Family History:  No family history on file.    Social History:  Social History     Socioeconomic History    Marital status: Single   Tobacco Use    Smoking status: Former     Current packs/day: 0.00     Types: Cigarettes     Quit date:      Years since quittin.6    Smokeless tobacco: Never   Substance and Sexual Activity    Alcohol use: No    Drug use: No     Social Determinants of Health     Financial Resource Strain: Unknown (2023)    Received from [facility]    Overall Financial Resource Strain (CARDIA)     Difficulty of Paying Living Expenses: Patient declined   Food Insecurity: Unknown (2023)    Received from [employer] [facility]    Hunger Vital Sign     Worried About Running Out of Food in the Last Year: Patient declined     Ran Out of Food in the Last Year: Patient declined   Transportation Needs: Unmet Transportation Needs (2023)    Received from [facility]    PRAPARE - Transportation     Lack of Transportation  "(Medical): No     Lack of Transportation (Non-Medical): Yes   Stress: No Stress Concern Present (2023)    [facility]    [employer] [facility] [employer] - Occupational Stress Questionnaire     Feeling of Stress : Not at all   Housing Stability: Unknown (2023)    Received from [facility]    Housing Stability Vital Sign     Unable to Pay for Housing in the Last Year: No     In the last 12 months, was there a time when you did not have a steady place to sleep or slept in a shelter (including now)?: No       OBJECTIVE:    BP (!) 160/73 (BP Location: Left arm, Patient Position: Sitting, BP Method: Small (Automatic))   Pulse (!) 56   Resp 18   Ht 5' 1" (1.549 m)   Wt 64.6 kg (142 lb 6.7 oz)   BMI 26.91 kg/m²     PHYSICAL EXAMINATION:    General appearance: Well appearing, in no acute distress, alert and oriented x3.  Psych:  Mood and affect appropriate. Tearful.  Skin: Skin color, texture, turgor normal, no rashes or lesions, in both upper and lower body.  Head/face:  Atraumatic, normocephalic. No palpable lymph nodes  Back: Straight leg raising in the sitting position is negative to radicular pain bilaterally. Limited ROM with pain on extension > flexion. Positive facet loading bilaterally. No tenderness to palpation over bilateral SIJ.   Extremities: TTP over medial and lateral joint line. No deformities, edema, or skin discoloration. Good capillary refill.  Musculoskeletal: Shoulder, Hip, Knee provocative maneuvers are negative. No tenderness to palpation over bilateral piriformis or GTB.  Bilateral upper and lower extremity strength is normal and symmetric.  No atrophy or tone abnormalities are noted.   Neuro: Negative Sapp's.No loss of sensation is noted.  Gait: Antalgic-ambulates with straight cane    ASSESSMENT: 74 y.o. year old female with low back and hip pain, consistent with the followin. Chronic pain syndrome  Ambulatory referral/consult " to Pain Clinic      2. Lumbar radiculopathy  Ambulatory referral/consult to Pain Clinic      3. Lumbosacral radiculopathy  pregabalin (LYRICA) 150 MG capsule    oxyCODONE-acetaminophen (PERCOCET)  mg per tablet      4. Cyst of lumbar facet joint  oxyCODONE-acetaminophen (PERCOCET)  mg per tablet      5. Osteoarthritis of spine with radiculopathy, lumbar region  oxyCODONE-acetaminophen (PERCOCET)  mg per tablet      6. Right hip pain  oxyCODONE-acetaminophen (PERCOCET)  mg per tablet              PLAN:  We discussed with the patient the assessment and recommendations. The following is the plan we agreed on:  Oxycodone refilled  Continue Lyrica 150 mg BID, refilled  Patient will contact Dr Ward re: pain pump evaluation, referral given  Patient Defers SCS Trial at this time  RTC in 3 months with MICHAEL    Cierra Duran MD   08/13/2024  I have personally taken the history and examined this patient and agree with the fellow's note as stated above.

## 2024-08-29 RX ORDER — PREGABALIN 75 MG/1
75 CAPSULE ORAL 2 TIMES DAILY
Qty: 60 CAPSULE | Refills: 1 | Status: CANCELLED | OUTPATIENT
Start: 2024-08-29 | End: 2024-10-28

## 2024-08-29 NOTE — TELEPHONE ENCOUNTER
Staff spoke with patient and informed her that her prescription was sent in with 12 refills and to contact the pharmacy for refills. Patient verbalized understanding.

## 2024-10-01 ENCOUNTER — TELEPHONE (OUTPATIENT)
Dept: NEUROSURGERY | Facility: CLINIC | Age: 74
End: 2024-10-01
Payer: MEDICARE

## 2024-10-11 ENCOUNTER — TELEPHONE (OUTPATIENT)
Dept: PODIATRY | Facility: CLINIC | Age: 74
End: 2024-10-11
Payer: MEDICARE

## 2024-10-14 ENCOUNTER — OFFICE VISIT (OUTPATIENT)
Dept: PODIATRY | Facility: CLINIC | Age: 74
End: 2024-10-14
Payer: MEDICARE

## 2024-10-14 VITALS
SYSTOLIC BLOOD PRESSURE: 149 MMHG | HEIGHT: 61 IN | HEART RATE: 57 BPM | DIASTOLIC BLOOD PRESSURE: 81 MMHG | BODY MASS INDEX: 26.91 KG/M2

## 2024-10-14 DIAGNOSIS — L84 CORN OR CALLUS: ICD-10-CM

## 2024-10-14 DIAGNOSIS — B35.1 ONYCHOMYCOSIS: ICD-10-CM

## 2024-10-14 DIAGNOSIS — E08.00 DIABETES MELLITUS DUE TO UNDERLYING CONDITION WITH HYPEROSMOLARITY WITHOUT COMA, WITHOUT LONG-TERM CURRENT USE OF INSULIN: Primary | ICD-10-CM

## 2024-10-14 PROCEDURE — 99999 PR PBB SHADOW E&M-EST. PATIENT-LVL III: CPT | Mod: PBBFAC,,, | Performed by: PODIATRIST

## 2024-10-14 PROCEDURE — 99213 OFFICE O/P EST LOW 20 MIN: CPT | Mod: 25,S$GLB,, | Performed by: PODIATRIST

## 2024-10-14 PROCEDURE — 11056 PARNG/CUTG B9 HYPRKR LES 2-4: CPT | Mod: S$GLB,,, | Performed by: PODIATRIST

## 2024-10-14 PROCEDURE — 3008F BODY MASS INDEX DOCD: CPT | Mod: CPTII,S$GLB,, | Performed by: PODIATRIST

## 2024-10-14 PROCEDURE — 1125F AMNT PAIN NOTED PAIN PRSNT: CPT | Mod: CPTII,S$GLB,, | Performed by: PODIATRIST

## 2024-10-14 PROCEDURE — 4010F ACE/ARB THERAPY RXD/TAKEN: CPT | Mod: CPTII,S$GLB,, | Performed by: PODIATRIST

## 2024-10-14 PROCEDURE — 1159F MED LIST DOCD IN RCRD: CPT | Mod: CPTII,S$GLB,, | Performed by: PODIATRIST

## 2024-10-14 PROCEDURE — 3079F DIAST BP 80-89 MM HG: CPT | Mod: CPTII,S$GLB,, | Performed by: PODIATRIST

## 2024-10-14 PROCEDURE — 11721 DEBRIDE NAIL 6 OR MORE: CPT | Mod: 59,S$GLB,, | Performed by: PODIATRIST

## 2024-10-14 PROCEDURE — 3077F SYST BP >= 140 MM HG: CPT | Mod: CPTII,S$GLB,, | Performed by: PODIATRIST

## 2024-10-14 RX ORDER — NAPROXEN 500 MG/1
500 TABLET ORAL 2 TIMES DAILY PRN
COMMUNITY

## 2024-10-14 RX ORDER — BLOOD SUGAR DIAGNOSTIC
STRIP MISCELLANEOUS
COMMUNITY
Start: 2024-07-31

## 2024-10-14 NOTE — PROGRESS NOTES
Subjective:      Patient ID: Chantal Ridley is a 74 y.o. female.    Chief Complaint:   Foot Swelling (Bilateral foot swollen from allergic reaction), Diabetic Foot Exam ( Pcp Ayse Moreno NP 07/24/2024), and Foot Pain (Bilateral foot pain aching pain swollen)    Chantal is a 74 y.o. female who presents to the clinic upon referral from Dr. Veronica parham. provider found  for evaluation and treatment of diabetic feet. Chantal has a past medical history of Diabetes mellitus, Fibromyalgia, degenerative disc disease, and Hypertension. Patient relates no major problem with feet.  Injections have been helping for nerve pain bilateral.  She is here for routine diabetic foot evaluation as well as increased bilateral foot joint pain.  She has had issues of chronic pain, injections previous visit helped significantly.  Here for routine care today.  Also has a new issue of edema bilateral lower extremity secondary to allergic reaction to a medication.  PCP: [facility] -     Date Last Seen by PCP:   Chief Complaint   Patient presents with    Foot Swelling     Bilateral foot swollen from allergic reaction    Diabetic Foot Exam      Pcp Ayse Moreno NP 07/24/2024    Foot Pain     Bilateral foot pain aching pain swollen         Current shoe gear: Casual shoes    Hemoglobin A1C   Date Value Ref Range Status   09/20/2022 6.3 (H) 4.5 - 5.7 % Final   02/14/2012 9.8 (H) 4.0 - 6.2 % Final         Review of Systems   Constitutional: Negative for chills, decreased appetite, fever and malaise/fatigue.   HENT:  Negative for congestion, hearing loss, nosebleeds and tinnitus.    Eyes:  Negative for double vision, pain, photophobia and visual disturbance.   Cardiovascular:  Negative for chest pain, claudication, cyanosis and leg swelling.   Respiratory:  Negative for cough, hemoptysis, shortness of breath and wheezing.    Endocrine: Negative for cold intolerance and heat intolerance.   Hematologic/Lymphatic: Negative for  adenopathy and bleeding problem.   Skin:  Negative for color change, dry skin, itching, nail changes and suspicious lesions.   Musculoskeletal:  Positive for joint pain and stiffness. Negative for arthritis and myalgias.   Gastrointestinal:  Negative for abdominal pain, jaundice, nausea and vomiting.   Genitourinary:  Negative for dysuria, frequency and hematuria.   Neurological:  Positive for numbness, paresthesias and sensory change. Negative for difficulty with concentration and loss of balance.   Psychiatric/Behavioral:  Negative for altered mental status, hallucinations and suicidal ideas. The patient is not nervous/anxious.    Allergic/Immunologic: Negative for environmental allergies and persistent infections.           Objective:      Physical Exam  Vitals reviewed.   Constitutional:       Appearance: She is well-developed.   HENT:      Head: Normocephalic and atraumatic.   Cardiovascular:      Pulses:           Dorsalis pedis pulses are 2+ on the right side and 2+ on the left side.        Posterior tibial pulses are 2+ on the right side and 2+ on the left side.   Pulmonary:      Effort: Pulmonary effort is normal.   Musculoskeletal:         General: Normal range of motion.      Comments: Inspection and palpation of the muscles joints and bones of both lower extremities reveal that muscle strength for the anterior lateral and posterior muscle groups and intrinsic muscle groups of the foot are all 5 over 5 symmetrical.     Tenderness to the right and left 1st metatarsal cuneiform joint.   Skin:     General: Skin is warm and dry.      Capillary Refill: Capillary refill takes 2 to 3 seconds.      Comments: Skin turgor is normal bilaterally.  Skin texture is well hydrated to both lower extremities.  No lesions or rashes or wounds appreciated bilaterally.  Nail plates 1 through 5 bilaterally are within normal limits for length and thickness.  No nail clubbing or incurvation noted.   Neurological:      Mental  Status: She is alert and oriented to person, place, and time.      Comments: Sharp dull light touch vibratory proprioceptive sensation are intact bilaterally.  Deep tendon reflexes to patellar and Achilles tendon are symmetrical 2 over 4 bilaterally.  No ankle clonus or Babinski reflexes noted bilaterally.  Coordination is normal to both feet and lower extremities.  Positive Tinel sign/provocation sign right tarsal tunnel.   Psychiatric:         Behavior: Behavior normal.               Assessment:       Encounter Diagnoses   Name Primary?    Diabetes mellitus due to underlying condition with hyperosmolarity without coma, without long-term current use of insulin Yes    Corn or callus     Onychomycosis      Independent visualization of imaging was performed.  Results were reviewed in detail with patient.       Plan:       Chantal was seen today for foot swelling, diabetic foot exam and foot pain.    Diagnoses and all orders for this visit:    Diabetes mellitus due to underlying condition with hyperosmolarity without coma, without long-term current use of insulin    Corn or callus    Onychomycosis      I counseled the patient on her conditions, their implications and medical management.    The nature of the condition, options for management, as well as potential risks and complications were discussed in detail with patient. Patient was amenable to my recommendations and left my office fully informed and will follow up as instructed or sooner if necessary.      Decision making:  Chronic illnesses discussed in detail, previous records/notes and imaging independently reviewed, prescription drug management performed in addition to lengthy discussion regarding both conservative and surgical treatment options.      Routine Foot Care    Performed by:  Robert Braun. EWAM  Authorized by:  Patient     Consent Done?:  Yes (Verbal)     Nail Care Type:  Debride  Location(s): All  (Left 1st Toe, Left 3rd Toe, Left 2nd Toe, Left 4th  Toe, Left 5th Toe, Right 1st Toe, Right 2nd Toe, Right 3rd Toe, Right 4th Toe and Right 5th Toe)  Patient tolerance:  Patient tolerated the procedure well with no immediate complications     With patient's permission, the toenails mentioned above were aggressively reduced and debrided using a nail nipper, removing all offending nail and debris. The patient will continue to monitor the areas daily, inspect the feet, wear protective shoe gear when ambulatory, and moisturizer to maintain skin integrity.      Callus Care Type: Debride    With patient's permission, the calluses/hyperkeratotic lesions mentioned above were aggressively reduced and debrided using a number 15 blade. The patient will continue to monitor the areas daily, inspect the feet, wear protective shoe gear when ambulatory, and moisturizer to maintain skin integrity.           Shoe inspection. Diabetic Foot Education. Patient reminded of the importance of good nutrition and blood sugar control to help prevent podiatric complications of diabetes. Patient instructed on proper foot hygeine. We discussed wearing proper shoe gear, daily foot inspections and Diabetic foot education in detail.    Return to clinic in 3-6 months or sooner if problems arise

## 2025-05-07 ENCOUNTER — TELEPHONE (OUTPATIENT)
Dept: PODIATRY | Facility: CLINIC | Age: 75
End: 2025-05-07
Payer: MEDICARE

## 2025-05-07 NOTE — TELEPHONE ENCOUNTER
----- Message from Afia sent at 5/7/2025  4:09 PM CDT -----  Regarding: Appt sooner  Contact: pt [phone]  Type:  Needs Medical AdviceWho Called: Chantal called in regards to getting another sooner appt or any time pass the first of month Would the patient rather a call back or a response via [facility]? Call back Best Call Back Number: pt [phone] Additional Information:

## 2025-05-07 NOTE — TELEPHONE ENCOUNTER
----- Message from Judith sent at 5/7/2025  2:25 PM CDT -----  Contact: [phone]  Patient is calling to reschedule appointment. Please call to assist. Due to the weather

## 2025-05-30 ENCOUNTER — TELEPHONE (OUTPATIENT)
Dept: PODIATRY | Facility: CLINIC | Age: 75
End: 2025-05-30
Payer: MEDICARE

## 2025-05-30 NOTE — TELEPHONE ENCOUNTER
Call patient to confirmed appointment on 06/02/2025 with Dr. Braun, Patient answer and stated she wont be able to make appointment due to her transportation. Patient stated she had a appointment for July 1 with Dr. Braun, but she ask if something comes available can she be seen soon. But she said she need a least 3 days to scheduled her transportation. Patient stated we can cancel the appointment for June 02, 2025 and she will keep her appointment for July 1, 2025 with Dr. Braun. Patient verbally confirmed to cancel appointment on June 02, 2025.

## 2025-07-01 ENCOUNTER — OFFICE VISIT (OUTPATIENT)
Dept: PODIATRY | Facility: CLINIC | Age: 75
End: 2025-07-01
Payer: MEDICARE

## 2025-07-01 VITALS
SYSTOLIC BLOOD PRESSURE: 149 MMHG | DIASTOLIC BLOOD PRESSURE: 79 MMHG | BODY MASS INDEX: 26.91 KG/M2 | HEART RATE: 55 BPM | HEIGHT: 61 IN

## 2025-07-01 DIAGNOSIS — L84 CORN OR CALLUS: ICD-10-CM

## 2025-07-01 DIAGNOSIS — E08.00 DIABETES MELLITUS DUE TO UNDERLYING CONDITION WITH HYPEROSMOLARITY WITHOUT COMA, WITHOUT LONG-TERM CURRENT USE OF INSULIN: Primary | ICD-10-CM

## 2025-07-01 DIAGNOSIS — M79.671 PAIN IN BOTH FEET: ICD-10-CM

## 2025-07-01 DIAGNOSIS — B35.1 ONYCHOMYCOSIS: ICD-10-CM

## 2025-07-01 DIAGNOSIS — M79.672 PAIN IN BOTH FEET: ICD-10-CM

## 2025-07-01 PROCEDURE — 99999 PR PBB SHADOW E&M-EST. PATIENT-LVL III: CPT | Mod: PBBFAC,,, | Performed by: PODIATRIST

## 2025-07-01 RX ORDER — DICLOFENAC SODIUM 10 MG/G
2 GEL TOPICAL 4 TIMES DAILY
Qty: 100 G | Refills: 2 | Status: SHIPPED | OUTPATIENT
Start: 2025-07-01

## 2025-07-01 RX ORDER — LIDOCAINE 50 MG/G
1 PATCH TOPICAL DAILY
Qty: 30 PATCH | Refills: 11 | Status: SHIPPED | OUTPATIENT
Start: 2025-07-01

## 2025-07-01 RX ORDER — LIDOCAINE AND PRILOCAINE 25; 25 MG/G; MG/G
CREAM TOPICAL
Qty: 6.5 G | Refills: 11 | Status: SHIPPED | OUTPATIENT
Start: 2025-07-01

## 2025-07-01 NOTE — PROGRESS NOTES
Subjective:      Patient ID: Chantal Ridley is a 75 y.o. female.    Chief Complaint:   No chief complaint on file.    Chantal is a 75 y.o. female who presents to the clinic upon referral from Dr. Veronica parham. provider found  for evaluation and treatment of diabetic feet. Chantal has a past medical history of Diabetes mellitus, Fibromyalgia, degenerative disc disease, and Hypertension. Patient relates no major problem with feet.  Injections have been helping for nerve pain bilateral.  She is here for routine diabetic foot evaluation as well as increased bilateral foot joint pain.  She has had issues of chronic pain, injections previous visit helped significantly.  Here for routine care today.  Also has a new issue of edema bilateral lower extremity secondary to allergic reaction to a medication.    7/1:  Patient returns today to clinic for routine nail care.  Patient denies any pain to the bilateral feet.  Denies noticing any wounds his feet.  Denies fevers, nausea, or vomiting.  According new pain to her R hallux.  No history of trauma.  This pain to her R hallux usually present when she is walking.  Patient is asking for renewal of lidocaine patches.  Denies SOB or chest pain.  Denies any new pedal complaints.    [facility]    Date Last Seen by PCP:   No chief complaint on file.    Current shoe gear: Casual shoes    Hemoglobin A1C   Date Value Ref Range Status   09/20/2022 6.3 (H) 4.5 - 5.7 % Final   02/14/2012 9.8 (H) 4.0 - 6.2 % Final     % HEMOGLOBIN A1C   Date Value Ref Range Status   10/28/2024 5.8 (H) <5.7 % of total Hgb Final     Comment:     For someone without known diabetes, a hemoglobin   A1c value between 5.7% and 6.4% is consistent with  prediabetes and should be confirmed with a   follow-up test.    For someone with known diabetes, a value <7%  indicates that their diabetes is well controlled. A1c  targets should be individualized based on duration of  diabetes, age,  comorbid conditions, and other  considerations.    This assay result is consistent with an increased risk  of diabetes.    Currently, no consensus exists regarding use of  hemoglobin A1c for diagnosis of diabetes for children.   12/08/2023 5.8 (H) <5.7 % of total Hgb Final     Comment:     For someone without known diabetes, a hemoglobin   A1c value between 5.7% and 6.4% is consistent with  prediabetes and should be confirmed with a   follow-up test.    For someone with known diabetes, a value <7%  indicates that their diabetes is well controlled. A1c  targets should be individualized based on duration of  diabetes, age, comorbid conditions, and other  considerations.    This assay result is consistent with an increased risk  of diabetes.    Currently, no consensus exists regarding use of  hemoglobin A1c for diagnosis of diabetes for children.     Review of Systems   Constitutional: Negative for chills, decreased appetite, fever and malaise/fatigue.   HENT:  Negative for congestion, hearing loss, nosebleeds and tinnitus.    Eyes:  Negative for double vision, pain, photophobia and visual disturbance.   Cardiovascular:  Negative for chest pain, claudication, cyanosis and leg swelling.   Respiratory:  Negative for cough, hemoptysis, shortness of breath and wheezing.    Endocrine: Negative for cold intolerance and heat intolerance.   Hematologic/Lymphatic: Negative for adenopathy and bleeding problem.   Skin:  Negative for color change, dry skin, itching, nail changes and suspicious lesions.   Musculoskeletal:  Positive for joint pain and stiffness. Negative for arthritis and myalgias.   Gastrointestinal:  Negative for abdominal pain, jaundice, nausea and vomiting.   Genitourinary:  Negative for dysuria, frequency and hematuria.   Neurological:  Positive for numbness, paresthesias and sensory change. Negative for difficulty with concentration and loss of balance.   Psychiatric/Behavioral:  Negative for altered mental  status, hallucinations and suicidal ideas. The patient is not nervous/anxious.    Allergic/Immunologic: Negative for environmental allergies and persistent infections.           Objective:      Physical Exam  Vitals reviewed.   Constitutional:       Appearance: She is well-developed.   HENT:      Head: Normocephalic and atraumatic.   Cardiovascular:      Pulses:           Dorsalis pedis pulses are 2+ on the right side and 2+ on the left side.        Posterior tibial pulses are 2+ on the right side and 2+ on the left side.   Pulmonary:      Effort: Pulmonary effort is normal.   Musculoskeletal:         General: Normal range of motion.      Comments: Inspection and palpation of the muscles joints and bones of both lower extremities reveal that muscle strength for the anterior lateral and posterior muscle groups and intrinsic muscle groups of the foot are all 5 over 5 symmetrical.     Tenderness to the right and left 1st metatarsal cuneiform joint.   Skin:     General: Skin is warm and dry.      Capillary Refill: Capillary refill takes 2 to 3 seconds.      Comments: Skin turgor is normal bilaterally.  Skin texture is well hydrated to both lower extremities.  No lesions or rashes or wounds appreciated bilaterally.  Nail plates 1 through 5 bilaterally are within normal limits for length and thickness.  No nail clubbing or incurvation noted.   Neurological:      Mental Status: She is alert and oriented to person, place, and time.      Comments: Sharp dull light touch vibratory proprioceptive sensation are intact bilaterally.  Deep tendon reflexes to patellar and Achilles tendon are symmetrical 2 over 4 bilaterally.  No ankle clonus or Babinski reflexes noted bilaterally.  Coordination is normal to both feet and lower extremities.  Positive Tinel sign/provocation sign right tarsal tunnel.   Psychiatric:         Behavior: Behavior normal.         Assessment:       Encounter Diagnoses   Name Primary?    Diabetes mellitus due  to underlying condition with hyperosmolarity without coma, without long-term current use of insulin Yes    Onychomycosis     Corn or callus        Independent visualization of imaging was performed.  Results were reviewed in detail with patient.       Plan:       Diagnoses and all orders for this visit:    Diabetes mellitus due to underlying condition with hyperosmolarity without coma, without long-term current use of insulin    Onychomycosis    Corn or callus        I counseled the patient on her conditions, their implications and medical management.    The nature of the condition, options for management, as well as potential risks and complications were discussed in detail with patient. Patient was amenable to my recommendations and left my office fully informed and will follow up as instructed or sooner if necessary.      Decision making:  Chronic illnesses discussed in detail, previous records/notes and imaging independently reviewed, prescription drug management performed in addition to lengthy discussion regarding both conservative and surgical treatment options.      Routine Foot Care    Date/Time: 7/1/2025 1:00 PM    Performed by: Mone Staples DPM  Authorized by: Robert Braun DPM    Consent Done?:  Yes (Verbal)    Nail Care Type:  Trim  Location(s): All  (Left 1st Toe, Left 3rd Toe, Left 2nd Toe, Left 4th Toe, Left 5th Toe, Right 1st Toe, Right 2nd Toe, Right 3rd Toe, Right 4th Toe and Right 5th Toe)  Patient tolerance:  Patient tolerated the procedure well with no immediate complications       Callus Care Type: Debride    With patient's permission, the calluses/hyperkeratotic lesions mentioned above were aggressively reduced and debrided using a number 15 blade. The patient will continue to monitor the areas daily, inspect the feet, wear protective shoe gear when ambulatory, and moisturizer to maintain skin integrity.       Shoe inspection. Diabetic Foot Education. Patient reminded of the  importance of good nutrition and blood sugar control to help prevent podiatric complications of diabetes. Patient instructed on proper foot hygeine. We discussed wearing proper shoe gear, daily foot inspections and Diabetic foot education in detail.    Rx EMLA cream for R hallux pain.  Represcribed Lidoderm patches 1 year supply.    Return to clinic in 3-6 months or sooner if problems arise     Mone Staples DPM   Podiatric Medicine & Surgery  [facility]

## 2025-07-09 ENCOUNTER — TELEPHONE (OUTPATIENT)
Dept: PODIATRY | Facility: CLINIC | Age: 75
End: 2025-07-09
Payer: MEDICARE

## 2025-07-09 NOTE — TELEPHONE ENCOUNTER
Copied from CRM #[id number]. Topic: General Inquiry - Patient Advice  >> Jul 9, 2025  2:59 PM Leroy wrote:  Patient Requesting Order         Order Needed:Hi, pt was seen on 07/01/25 and was given a physical order. Pt is needing the order faxed over to: [facility] fax [phone].         Communication Preference: Thank you

## 2025-08-21 DIAGNOSIS — G89.4 CHRONIC PAIN SYNDROME: Primary | ICD-10-CM
